# Patient Record
Sex: FEMALE | Race: WHITE | NOT HISPANIC OR LATINO | URBAN - METROPOLITAN AREA
[De-identification: names, ages, dates, MRNs, and addresses within clinical notes are randomized per-mention and may not be internally consistent; named-entity substitution may affect disease eponyms.]

---

## 2017-07-18 ENCOUNTER — APPOINTMENT (RX ONLY)
Dept: URBAN - METROPOLITAN AREA CLINIC 44 | Facility: CLINIC | Age: 82
Setting detail: DERMATOLOGY
End: 2017-07-18

## 2017-07-18 ENCOUNTER — APPOINTMENT (RX ONLY)
Dept: URBAN - METROPOLITAN AREA CLINIC 45 | Facility: CLINIC | Age: 82
Setting detail: DERMATOLOGY
End: 2017-07-18

## 2017-07-18 DIAGNOSIS — L82.1 OTHER SEBORRHEIC KERATOSIS: ICD-10-CM

## 2017-07-18 DIAGNOSIS — D18.0 HEMANGIOMA: ICD-10-CM

## 2017-07-18 DIAGNOSIS — L57.0 ACTINIC KERATOSIS: ICD-10-CM

## 2017-07-18 DIAGNOSIS — L81.4 OTHER MELANIN HYPERPIGMENTATION: ICD-10-CM

## 2017-07-18 DIAGNOSIS — D22 MELANOCYTIC NEVI: ICD-10-CM

## 2017-07-18 PROBLEM — D18.01 HEMANGIOMA OF SKIN AND SUBCUTANEOUS TISSUE: Status: ACTIVE | Noted: 2017-07-18

## 2017-07-18 PROBLEM — D22.39 MELANOCYTIC NEVI OF OTHER PARTS OF FACE: Status: ACTIVE | Noted: 2017-07-18

## 2017-07-18 PROBLEM — I10 ESSENTIAL (PRIMARY) HYPERTENSION: Status: ACTIVE | Noted: 2017-07-18

## 2017-07-18 PROCEDURE — 99214 OFFICE O/P EST MOD 30 MIN: CPT | Mod: 25

## 2017-07-18 PROCEDURE — 17000 DESTRUCT PREMALG LESION: CPT

## 2017-07-18 PROCEDURE — 17003 DESTRUCT PREMALG LES 2-14: CPT

## 2017-07-18 PROCEDURE — ? COUNSELING

## 2017-07-18 PROCEDURE — ? LIQUID NITROGEN

## 2017-07-18 ASSESSMENT — LOCATION DETAILED DESCRIPTION DERM
LOCATION DETAILED: LEFT INFERIOR MEDIAL FOREHEAD
LOCATION DETAILED: RIGHT INFERIOR CENTRAL MALAR CHEEK
LOCATION DETAILED: RIGHT INFERIOR UPPER BACK
LOCATION DETAILED: LEFT INFERIOR CENTRAL MALAR CHEEK
LOCATION DETAILED: RIGHT SUPERIOR MEDIAL UPPER BACK
LOCATION DETAILED: LEFT DORSAL INDEX FINGER METACARPOPHALANGEAL JOINT
LOCATION DETAILED: RIGHT RADIAL DORSAL HAND
LOCATION DETAILED: LEFT INFERIOR MEDIAL MIDBACK
LOCATION DETAILED: RIGHT SUPERIOR MEDIAL UPPER BACK
LOCATION DETAILED: LEFT MID-UPPER BACK
LOCATION DETAILED: LEFT INFERIOR CENTRAL MALAR CHEEK
LOCATION DETAILED: RIGHT MID-UPPER BACK
LOCATION DETAILED: LEFT RADIAL DORSAL HAND
LOCATION DETAILED: RIGHT INFERIOR CENTRAL MALAR CHEEK
LOCATION DETAILED: LEFT INFERIOR MEDIAL FOREHEAD
LOCATION DETAILED: LEFT RADIAL DORSAL HAND
LOCATION DETAILED: INFERIOR THORACIC SPINE
LOCATION DETAILED: RIGHT INFERIOR UPPER BACK
LOCATION DETAILED: RIGHT MID-UPPER BACK
LOCATION DETAILED: RIGHT RADIAL DORSAL HAND
LOCATION DETAILED: LEFT DORSAL INDEX FINGER METACARPOPHALANGEAL JOINT
LOCATION DETAILED: INFERIOR THORACIC SPINE
LOCATION DETAILED: LEFT SUPERIOR MEDIAL UPPER BACK
LOCATION DETAILED: LEFT MID-UPPER BACK
LOCATION DETAILED: LEFT SUPERIOR MEDIAL UPPER BACK
LOCATION DETAILED: LEFT INFERIOR MEDIAL MIDBACK

## 2017-07-18 ASSESSMENT — LOCATION ZONE DERM
LOCATION ZONE: FACE
LOCATION ZONE: HAND
LOCATION ZONE: TRUNK
LOCATION ZONE: FACE
LOCATION ZONE: HAND
LOCATION ZONE: TRUNK

## 2017-07-18 ASSESSMENT — LOCATION SIMPLE DESCRIPTION DERM
LOCATION SIMPLE: LEFT CHEEK
LOCATION SIMPLE: LEFT UPPER BACK
LOCATION SIMPLE: LEFT CHEEK
LOCATION SIMPLE: LEFT LOWER BACK
LOCATION SIMPLE: RIGHT CHEEK
LOCATION SIMPLE: LEFT UPPER BACK
LOCATION SIMPLE: LEFT HAND
LOCATION SIMPLE: LEFT FOREHEAD
LOCATION SIMPLE: UPPER BACK
LOCATION SIMPLE: RIGHT HAND
LOCATION SIMPLE: LEFT LOWER BACK
LOCATION SIMPLE: LEFT FOREHEAD
LOCATION SIMPLE: LEFT HAND
LOCATION SIMPLE: RIGHT UPPER BACK
LOCATION SIMPLE: RIGHT UPPER BACK
LOCATION SIMPLE: UPPER BACK
LOCATION SIMPLE: RIGHT CHEEK
LOCATION SIMPLE: RIGHT HAND

## 2017-07-18 NOTE — PROCEDURE: LIQUID NITROGEN
Number Of Freeze-Thaw Cycles: 1 freeze-thaw cycle
Render Post-Care Instructions In Note?: no
Duration Of Freeze Thaw-Cycle (Seconds): 5
Post-Care Instructions: I reviewed with the patient in detail post-care instructions. Patient is to wear sunprotection, and avoid picking at any of the treated lesions. Pt may apply Vaseline to crusted or scabbing areas.
Detail Level: Detailed
Consent: The patient's consent was obtained including but not limited to risks of crusting, scabbing, blistering, scarring, darker or lighter pigmentary change, recurrence, incomplete removal and infection.

## 2017-07-18 NOTE — PROCEDURE: MIPS QUALITY
Quality 47: Advance Care Plan: Advance Care Planning discussed and documented; advance care plan or surrogate decision maker documented in the medical record.
Detail Level: Detailed
Quality 111:Pneumonia Vaccination Status For Older Adults: Pneumococcal Vaccination Previously Received
Quality 226: Preventive Care And Screening: Tobacco Use: Screening And Cessation Intervention: Patient screened for tobacco and never smoked
Quality 110: Preventive Care And Screening: Influenza Immunization: Influenza immunization was not ordered or administered, reason not given
Quality 431: Preventive Care And Screening: Unhealthy Alcohol Use - Screening: Patient screened for unhealthy alcohol use using a single question and scores less than 2 times per year
Quality 130: Documentation Of Current Medications In The Medical Record: Current Medications Documented
Name And Contact Information For Health Care Proxy: Leonardo Ahn [phone]

## 2017-07-18 NOTE — PROCEDURE: LIQUID NITROGEN
Render Post-Care Instructions In Note?: no
Detail Level: Detailed
Post-Care Instructions: I reviewed with the patient in detail post-care instructions. Patient is to wear sunprotection, and avoid picking at any of the treated lesions. Pt may apply Vaseline to crusted or scabbing areas.
Number Of Freeze-Thaw Cycles: 1 freeze-thaw cycle
Duration Of Freeze Thaw-Cycle (Seconds): 5
Consent: The patient's consent was obtained including but not limited to risks of crusting, scabbing, blistering, scarring, darker or lighter pigmentary change, recurrence, incomplete removal and infection.

## 2017-09-25 ENCOUNTER — RX ONLY (OUTPATIENT)
Age: 82
Setting detail: RX ONLY
End: 2017-09-25

## 2017-09-25 RX ORDER — CARBIT/EQUIS XT/ETHAN/CHIT/MSM
LIQUID (ML) TOPICAL ONCE A DAY
Qty: 3 | Refills: 2 | Status: ERX

## 2018-07-24 ENCOUNTER — APPOINTMENT (RX ONLY)
Dept: URBAN - METROPOLITAN AREA CLINIC 44 | Facility: CLINIC | Age: 83
Setting detail: DERMATOLOGY
End: 2018-07-24

## 2018-07-24 ENCOUNTER — APPOINTMENT (RX ONLY)
Dept: URBAN - METROPOLITAN AREA CLINIC 45 | Facility: CLINIC | Age: 83
Setting detail: DERMATOLOGY
End: 2018-07-24

## 2018-07-24 DIAGNOSIS — Q826 OTHER SPECIFIED ANOMALIES OF SKIN: ICD-10-CM

## 2018-07-24 DIAGNOSIS — L57.0 ACTINIC KERATOSIS: ICD-10-CM

## 2018-07-24 DIAGNOSIS — D36.1 BENIGN NEOPLASM OF PERIPHERAL NERVES AND AUTONOMIC NERVOUS SYSTEM: ICD-10-CM

## 2018-07-24 DIAGNOSIS — L28.1 PRURIGO NODULARIS: ICD-10-CM

## 2018-07-24 DIAGNOSIS — L81.4 OTHER MELANIN HYPERPIGMENTATION: ICD-10-CM

## 2018-07-24 DIAGNOSIS — Q828 OTHER SPECIFIED ANOMALIES OF SKIN: ICD-10-CM

## 2018-07-24 DIAGNOSIS — L82.1 OTHER SEBORRHEIC KERATOSIS: ICD-10-CM

## 2018-07-24 DIAGNOSIS — D18.0 HEMANGIOMA: ICD-10-CM

## 2018-07-24 DIAGNOSIS — Q819 OTHER SPECIFIED ANOMALIES OF SKIN: ICD-10-CM

## 2018-07-24 DIAGNOSIS — D22 MELANOCYTIC NEVI: ICD-10-CM

## 2018-07-24 PROBLEM — D18.01 HEMANGIOMA OF SKIN AND SUBCUTANEOUS TISSUE: Status: ACTIVE | Noted: 2018-07-24

## 2018-07-24 PROBLEM — Q82.8 OTHER SPECIFIED CONGENITAL MALFORMATIONS OF SKIN: Status: ACTIVE | Noted: 2018-07-24

## 2018-07-24 PROBLEM — D22.39 MELANOCYTIC NEVI OF OTHER PARTS OF FACE: Status: ACTIVE | Noted: 2018-07-24

## 2018-07-24 PROBLEM — D36.11 BENIGN NEOPLASM OF PERIPHERAL NERVES AND AUTONOMIC NERVOUS SYSTEM OF FACE, HEAD, AND NECK: Status: ACTIVE | Noted: 2018-07-24

## 2018-07-24 PROCEDURE — ? COUNSELING

## 2018-07-24 PROCEDURE — ? LIQUID NITROGEN

## 2018-07-24 PROCEDURE — 99214 OFFICE O/P EST MOD 30 MIN: CPT | Mod: 25

## 2018-07-24 PROCEDURE — 17000 DESTRUCT PREMALG LESION: CPT

## 2018-07-24 PROCEDURE — ? OBSERVATION AND MEASURE

## 2018-07-24 PROCEDURE — ? TREATMENT REGIMEN

## 2018-07-24 PROCEDURE — 17003 DESTRUCT PREMALG LES 2-14: CPT

## 2018-07-24 ASSESSMENT — LOCATION SIMPLE DESCRIPTION DERM
LOCATION SIMPLE: LEFT THIGH
LOCATION SIMPLE: LEFT UPPER BACK
LOCATION SIMPLE: LEFT POSTERIOR UPPER ARM
LOCATION SIMPLE: LEFT HAND
LOCATION SIMPLE: RIGHT HAND
LOCATION SIMPLE: RIGHT POSTERIOR UPPER ARM
LOCATION SIMPLE: LEFT HAND
LOCATION SIMPLE: LEFT FOREHEAD
LOCATION SIMPLE: LEFT LOWER BACK
LOCATION SIMPLE: NECK
LOCATION SIMPLE: RIGHT HAND
LOCATION SIMPLE: RIGHT THIGH
LOCATION SIMPLE: NECK
LOCATION SIMPLE: LEFT THIGH
LOCATION SIMPLE: RIGHT CHEEK
LOCATION SIMPLE: LEFT CHEEK
LOCATION SIMPLE: UPPER BACK
LOCATION SIMPLE: LEFT POSTERIOR UPPER ARM
LOCATION SIMPLE: LEFT MIDDLE FINGER
LOCATION SIMPLE: RIGHT UPPER BACK
LOCATION SIMPLE: RIGHT UPPER BACK
LOCATION SIMPLE: LEFT FOREHEAD
LOCATION SIMPLE: UPPER BACK
LOCATION SIMPLE: LEFT LOWER BACK
LOCATION SIMPLE: LEFT UPPER BACK
LOCATION SIMPLE: RIGHT CHEEK
LOCATION SIMPLE: LEFT CHEEK
LOCATION SIMPLE: RIGHT POSTERIOR UPPER ARM
LOCATION SIMPLE: RIGHT THIGH
LOCATION SIMPLE: LEFT MIDDLE FINGER

## 2018-07-24 ASSESSMENT — LOCATION DETAILED DESCRIPTION DERM
LOCATION DETAILED: LEFT MID-UPPER BACK
LOCATION DETAILED: LEFT ANTERIOR DISTAL THIGH
LOCATION DETAILED: RIGHT SUPERIOR MEDIAL UPPER BACK
LOCATION DETAILED: INFERIOR THORACIC SPINE
LOCATION DETAILED: RIGHT SUPERIOR MEDIAL UPPER BACK
LOCATION DETAILED: LEFT ULNAR DORSAL HAND
LOCATION DETAILED: RIGHT INFERIOR CENTRAL MALAR CHEEK
LOCATION DETAILED: LEFT SUPERIOR MEDIAL UPPER BACK
LOCATION DETAILED: LEFT PROXIMAL POSTERIOR UPPER ARM
LOCATION DETAILED: LEFT INFERIOR MEDIAL MIDBACK
LOCATION DETAILED: LEFT PROXIMAL POSTERIOR UPPER ARM
LOCATION DETAILED: LEFT INFERIOR MEDIAL MIDBACK
LOCATION DETAILED: RIGHT ANTERIOR PROXIMAL THIGH
LOCATION DETAILED: RIGHT MID-UPPER BACK
LOCATION DETAILED: LEFT MID-UPPER BACK
LOCATION DETAILED: LEFT SUPERIOR MEDIAL UPPER BACK
LOCATION DETAILED: RIGHT PROXIMAL POSTERIOR UPPER ARM
LOCATION DETAILED: RIGHT INFERIOR UPPER BACK
LOCATION DETAILED: LEFT INFERIOR CENTRAL MALAR CHEEK
LOCATION DETAILED: INFERIOR THORACIC SPINE
LOCATION DETAILED: RIGHT ANTERIOR PROXIMAL THIGH
LOCATION DETAILED: LEFT INFERIOR MEDIAL FOREHEAD
LOCATION DETAILED: LEFT PROXIMAL DORSAL MIDDLE FINGER
LOCATION DETAILED: RIGHT INFERIOR UPPER BACK
LOCATION DETAILED: LEFT PROXIMAL DORSAL MIDDLE FINGER
LOCATION DETAILED: LEFT INFERIOR MEDIAL FOREHEAD
LOCATION DETAILED: RIGHT RADIAL DORSAL HAND
LOCATION DETAILED: RIGHT PROXIMAL POSTERIOR UPPER ARM
LOCATION DETAILED: LEFT RADIAL DORSAL HAND
LOCATION DETAILED: RIGHT MID-UPPER BACK
LOCATION DETAILED: LEFT RADIAL DORSAL HAND
LOCATION DETAILED: RIGHT INFERIOR CENTRAL MALAR CHEEK
LOCATION DETAILED: RIGHT RADIAL DORSAL HAND
LOCATION DETAILED: LEFT ANTERIOR DISTAL THIGH
LOCATION DETAILED: LEFT ULNAR DORSAL HAND
LOCATION DETAILED: LEFT INFERIOR CENTRAL MALAR CHEEK
LOCATION DETAILED: RIGHT SUPERIOR LATERAL NECK
LOCATION DETAILED: RIGHT SUPERIOR LATERAL NECK

## 2018-07-24 ASSESSMENT — LOCATION ZONE DERM
LOCATION ZONE: FINGER
LOCATION ZONE: FINGER
LOCATION ZONE: NECK
LOCATION ZONE: ARM
LOCATION ZONE: HAND
LOCATION ZONE: FACE
LOCATION ZONE: LEG
LOCATION ZONE: HAND
LOCATION ZONE: NECK
LOCATION ZONE: ARM
LOCATION ZONE: TRUNK
LOCATION ZONE: LEG
LOCATION ZONE: TRUNK
LOCATION ZONE: FACE

## 2018-07-24 NOTE — PROCEDURE: MIPS QUALITY
Quality 226: Preventive Care And Screening: Tobacco Use: Screening And Cessation Intervention: Patient screened for tobacco and never smoked
Quality 431: Preventive Care And Screening: Unhealthy Alcohol Use - Screening: Patient screened for unhealthy alcohol use using a single question and scores less than 2 times per year
Name And Contact Information For Health Care Proxy: Artur Bradford [phone]
Quality 111:Pneumonia Vaccination Status For Older Adults: Pneumococcal Vaccination Previously Received
Quality 47: Advance Care Plan: Advance Care Planning discussed and documented; advance care plan or surrogate decision maker documented in the medical record.
Quality 130: Documentation Of Current Medications In The Medical Record: Current Medications Documented
Detail Level: Detailed

## 2018-07-24 NOTE — PROCEDURE: TREATMENT REGIMEN
Plan: Patient was instructed to use an exfoliating loofah in shower and to apply CeraVe SE or Eucerin Advance Repair (coupon given) after shower
Detail Level: Simple
Samples Given: Impoyz
Samples Given: Neutrogena sunscreen with coupon

## 2018-07-24 NOTE — PROCEDURE: MIPS QUALITY
Name And Contact Information For Health Care Proxy: Leonardo Ahn [phone]
Quality 47: Advance Care Plan: Advance Care Planning discussed and documented; advance care plan or surrogate decision maker documented in the medical record.
Quality 226: Preventive Care And Screening: Tobacco Use: Screening And Cessation Intervention: Patient screened for tobacco and never smoked
Quality 431: Preventive Care And Screening: Unhealthy Alcohol Use - Screening: Patient screened for unhealthy alcohol use using a single question and scores less than 2 times per year
Quality 111:Pneumonia Vaccination Status For Older Adults: Pneumococcal Vaccination Previously Received
Detail Level: Detailed
Quality 130: Documentation Of Current Medications In The Medical Record: Current Medications Documented

## 2018-07-24 NOTE — PROCEDURE: LIQUID NITROGEN
Number Of Freeze-Thaw Cycles: 1 freeze-thaw cycle
Duration Of Freeze Thaw-Cycle (Seconds): 5
Consent: The patient's consent was obtained including but not limited to risks of crusting, scabbing, blistering, scarring, darker or lighter pigmentary change, recurrence, incomplete removal and infection.
Post-Care Instructions: I reviewed with the patient in detail post-care instructions. Patient is to wear sunprotection, and avoid picking at any of the treated lesions. Pt may apply Vaseline to crusted or scabbing areas.
Detail Level: Detailed
Render Post-Care Instructions In Note?: no

## 2018-11-20 ENCOUNTER — APPOINTMENT (RX ONLY)
Dept: URBAN - METROPOLITAN AREA CLINIC 38 | Facility: CLINIC | Age: 83
Setting detail: DERMATOLOGY
End: 2018-11-20

## 2018-11-20 ENCOUNTER — APPOINTMENT (RX ONLY)
Dept: URBAN - METROPOLITAN AREA CLINIC 37 | Facility: CLINIC | Age: 83
Setting detail: DERMATOLOGY
End: 2018-11-20

## 2018-11-20 DIAGNOSIS — L29.89 OTHER PRURITUS: ICD-10-CM

## 2018-11-20 DIAGNOSIS — L29.8 OTHER PRURITUS: ICD-10-CM

## 2018-11-20 PROCEDURE — ? PRESCRIPTION

## 2018-11-20 PROCEDURE — ? COUNSELING

## 2018-11-20 PROCEDURE — 99213 OFFICE O/P EST LOW 20 MIN: CPT

## 2018-11-20 PROCEDURE — ? ADDITIONAL NOTES

## 2018-11-20 RX ORDER — TRIAMCINOLONE ACETONIDE 1 MG/G
THIN LAYER CREAM TOPICAL BID
Qty: 1 | Refills: 1 | Status: ERX | COMMUNITY
Start: 2018-11-20

## 2018-11-20 RX ADMIN — TRIAMCINOLONE ACETONIDE THIN LAYER: 1 CREAM TOPICAL at 00:00

## 2018-11-20 ASSESSMENT — LOCATION DETAILED DESCRIPTION DERM
LOCATION DETAILED: INFERIOR THORACIC SPINE
LOCATION DETAILED: PERIUMBILICAL SKIN
LOCATION DETAILED: INFERIOR THORACIC SPINE
LOCATION DETAILED: PERIUMBILICAL SKIN

## 2018-11-20 ASSESSMENT — LOCATION SIMPLE DESCRIPTION DERM
LOCATION SIMPLE: UPPER BACK
LOCATION SIMPLE: ABDOMEN
LOCATION SIMPLE: UPPER BACK
LOCATION SIMPLE: ABDOMEN

## 2018-11-20 ASSESSMENT — LOCATION ZONE DERM
LOCATION ZONE: TRUNK
LOCATION ZONE: TRUNK

## 2018-11-20 NOTE — PROCEDURE: MIPS QUALITY
Quality 47: Advance Care Plan: Advance Care Planning discussed and documented; advance care plan or surrogate decision maker documented in the medical record.
Quality 111:Pneumonia Vaccination Status For Older Adults: Pneumococcal Vaccination Previously Received
Quality 130: Documentation Of Current Medications In The Medical Record: Current Medications Documented
Name And Contact Information For Health Care Proxy: Artur Bradford [phone]
Quality 226: Preventive Care And Screening: Tobacco Use: Screening And Cessation Intervention: Patient screened for tobacco use and is an ex/non-smoker
Quality 110: Preventive Care And Screening: Influenza Immunization: Influenza Immunization Administered during Influenza season
Detail Level: Detailed
Quality 431: Preventive Care And Screening: Unhealthy Alcohol Use - Screening: Patient screened for unhealthy alcohol use using a single question and scores less than 2 times per year

## 2018-11-20 NOTE — PROCEDURE: ADDITIONAL NOTES
Additional Notes: This is pruritus, predominantly of the trunk, for about 2 months, although it began nebulously.  OTC HC cream seemed to help a little.  She related it possibly to changing the dose on her LEXAPRO, but stopped that 2 weeks ago and the itching persists.  She has never had anything like this before.  She uses DIAL soap.\\n\\nThere is no rash today.  There is mild xerosis.\\n\\nDiscuss skin diet to monitor soaps, moisturizers, detergents.\\n\\nDiscontinue Dial\\n\\nStart gentle wash Qd\\nStart moisturizing daily \\nStart Dermend Anti itch lotion 3-4x daily\\nStart Triamcinolone cream bid x2 weeks / month as needed.\\n \\nDermend coupon given.\\n\\nAdvised since discontinuing Lexapro give it at least 6 to 8 weeks.\\n\\nRto in 1 month if rash worsens will consider blood work or possibly bx at that time.
Detail Level: Simple

## 2018-11-20 NOTE — HPI: RASH
What Type Of Note Output Would You Prefer (Optional)?: Bullet Format
How Severe Is Your Rash?: mild
Is This A New Presentation, Or A Follow-Up?: Rash
Additional History: Patient states and her pcp been trying to observe and monitor her LEXAPRO intake. Pcp d/c lexapro rash still remains. She has changed laundry detergent and diet to try to figure out what is causing the rash.

## 2018-11-20 NOTE — PROCEDURE: MIPS QUALITY
Detail Level: Detailed
Quality 110: Preventive Care And Screening: Influenza Immunization: Influenza Immunization Administered during Influenza season
Name And Contact Information For Health Care Proxy: Leonardo Ahn [phone]
Quality 130: Documentation Of Current Medications In The Medical Record: Current Medications Documented
Quality 47: Advance Care Plan: Advance Care Planning discussed and documented; advance care plan or surrogate decision maker documented in the medical record.
Quality 111:Pneumonia Vaccination Status For Older Adults: Pneumococcal Vaccination Previously Received
Quality 226: Preventive Care And Screening: Tobacco Use: Screening And Cessation Intervention: Patient screened for tobacco use and is an ex/non-smoker
Quality 431: Preventive Care And Screening: Unhealthy Alcohol Use - Screening: Patient screened for unhealthy alcohol use using a single question and scores less than 2 times per year

## 2018-11-27 ENCOUNTER — APPOINTMENT (RX ONLY)
Dept: URBAN - METROPOLITAN AREA CLINIC 37 | Facility: CLINIC | Age: 83
Setting detail: DERMATOLOGY
End: 2018-11-27

## 2018-11-27 ENCOUNTER — APPOINTMENT (RX ONLY)
Dept: URBAN - METROPOLITAN AREA CLINIC 38 | Facility: CLINIC | Age: 83
Setting detail: DERMATOLOGY
End: 2018-11-27

## 2018-11-27 DIAGNOSIS — L29.89 OTHER PRURITUS: ICD-10-CM

## 2018-11-27 DIAGNOSIS — L29.8 OTHER PRURITUS: ICD-10-CM

## 2018-11-27 PROCEDURE — ? COUNSELING

## 2018-11-27 PROCEDURE — ? ADDITIONAL NOTES

## 2018-11-27 PROCEDURE — 99213 OFFICE O/P EST LOW 20 MIN: CPT

## 2018-11-27 ASSESSMENT — LOCATION ZONE DERM
LOCATION ZONE: TRUNK
LOCATION ZONE: TRUNK

## 2018-11-27 ASSESSMENT — LOCATION DETAILED DESCRIPTION DERM
LOCATION DETAILED: INFERIOR THORACIC SPINE
LOCATION DETAILED: INFERIOR THORACIC SPINE
LOCATION DETAILED: PERIUMBILICAL SKIN
LOCATION DETAILED: PERIUMBILICAL SKIN

## 2018-11-27 ASSESSMENT — LOCATION SIMPLE DESCRIPTION DERM
LOCATION SIMPLE: ABDOMEN
LOCATION SIMPLE: UPPER BACK
LOCATION SIMPLE: UPPER BACK
LOCATION SIMPLE: ABDOMEN

## 2018-11-27 NOTE — PROCEDURE: ADDITIONAL NOTES
Detail Level: Simple
Additional Notes: This is pruritus, predominantly of the trunk, for about 2+ months, although it began nebulously.  OTC HC cream seemed to help a little.  She related it possibly to changing the dose on her LEXAPRO, but stopped that 2 weeks ago and the itching persists.  She has never had anything like this before.  She uses DIAL soap as well as [employer] gels.  I saw her 1 week ago but she came back today because the itching is persistent.\\n\\nReviewed CMP and CBC with diff from PCP taken Oct 2018.  These were unremarkable.\\n\\nThere is no rash today.  There is mild xerosis.\\n\\nDiscuss skin diet to monitor soaps, moisturizers, detergents.\\n\\nDiscontinue Dial / BBW cleanser.\\n\\nStart gentle wash Qd\\nStart moisturizing daily \\nStart Dermend Anti itch lotion 3-4x daily; sample given.\\nCont Triamcinolone cream bid x2 weeks / month as needed.\\n\\nAdvised since discontinuing Lexapro give it at least 6 to 8 weeks.\\n\\nRto in 1 month if rash worsens will consider extended blood work or possibly bx at that time.

## 2018-11-27 NOTE — PROCEDURE: MIPS QUALITY
Quality 111:Pneumonia Vaccination Status For Older Adults: Pneumococcal Vaccination Previously Received
Quality 130: Documentation Of Current Medications In The Medical Record: Current Medications Documented
Quality 110: Preventive Care And Screening: Influenza Immunization: Influenza Immunization Administered during Influenza season
Quality 431: Preventive Care And Screening: Unhealthy Alcohol Use - Screening: Patient screened for unhealthy alcohol use using a single question and scores less than 2 times per year
Quality 47: Advance Care Plan: Advance Care Planning discussed and documented; advance care plan or surrogate decision maker documented in the medical record.
Quality 226: Preventive Care And Screening: Tobacco Use: Screening And Cessation Intervention: Patient screened for tobacco use and is an ex/non-smoker
Name And Contact Information For Health Care Proxy: Leonardo Ahn [phone]
Detail Level: Detailed

## 2018-11-27 NOTE — PROCEDURE: MIPS QUALITY
Quality 130: Documentation Of Current Medications In The Medical Record: Current Medications Documented
Quality 47: Advance Care Plan: Advance Care Planning discussed and documented; advance care plan or surrogate decision maker documented in the medical record.
Quality 110: Preventive Care And Screening: Influenza Immunization: Influenza Immunization Administered during Influenza season
Quality 226: Preventive Care And Screening: Tobacco Use: Screening And Cessation Intervention: Patient screened for tobacco use and is an ex/non-smoker
Quality 111:Pneumonia Vaccination Status For Older Adults: Pneumococcal Vaccination Previously Received
Name And Contact Information For Health Care Proxy: Artur Bradford [phone]
Quality 431: Preventive Care And Screening: Unhealthy Alcohol Use - Screening: Patient screened for unhealthy alcohol use using a single question and scores less than 2 times per year
Detail Level: Detailed

## 2018-11-27 NOTE — PROCEDURE: ADDITIONAL NOTES
Additional Notes: This is pruritus, predominantly of the trunk, for about 2+ months, although it began nebulously.  OTC HC cream seemed to help a little.  She related it possibly to changing the dose on her LEXAPRO, but stopped that 2 weeks ago and the itching persists.  She has never had anything like this before.  She uses DIAL soap as well as B[employer] B[employer] gels.  I saw her 1 week ago but she came back today because the itching is persistent.\\n\\nReviewed CMP and CBC with diff from PCP taken Oct 2018.  These were unremarkable.\\n\\nThere is no rash today.  There is mild xerosis.\\n\\nDiscuss skin diet to monitor soaps, moisturizers, detergents.\\n\\nDiscontinue Dial / BBW cleanser.\\n\\nStart gentle wash Qd\\nStart moisturizing daily \\nStart Dermend Anti itch lotion 3-4x daily; sample given.\\nCont Triamcinolone cream bid x2 weeks / month as needed.\\n\\nAdvised since discontinuing Lexapro give it at least 6 to 8 weeks.\\n\\nRto in 1 month if rash worsens will consider extended blood work or possibly bx at that time.
Detail Level: Simple

## 2018-11-30 ENCOUNTER — APPOINTMENT (RX ONLY)
Dept: URBAN - METROPOLITAN AREA CLINIC 44 | Facility: CLINIC | Age: 83
Setting detail: DERMATOLOGY
End: 2018-11-30

## 2018-11-30 ENCOUNTER — APPOINTMENT (RX ONLY)
Dept: URBAN - METROPOLITAN AREA CLINIC 45 | Facility: CLINIC | Age: 83
Setting detail: DERMATOLOGY
End: 2018-11-30

## 2018-11-30 DIAGNOSIS — L29.8 OTHER PRURITUS: ICD-10-CM

## 2018-11-30 DIAGNOSIS — L29.89 OTHER PRURITUS: ICD-10-CM

## 2018-11-30 PROCEDURE — 99213 OFFICE O/P EST LOW 20 MIN: CPT

## 2018-11-30 PROCEDURE — ? COUNSELING

## 2018-11-30 PROCEDURE — ? ADDITIONAL NOTES

## 2018-11-30 ASSESSMENT — LOCATION SIMPLE DESCRIPTION DERM
LOCATION SIMPLE: ABDOMEN
LOCATION SIMPLE: ABDOMEN
LOCATION SIMPLE: UPPER BACK
LOCATION SIMPLE: UPPER BACK

## 2018-11-30 ASSESSMENT — LOCATION ZONE DERM
LOCATION ZONE: TRUNK
LOCATION ZONE: TRUNK

## 2018-11-30 ASSESSMENT — LOCATION DETAILED DESCRIPTION DERM
LOCATION DETAILED: PERIUMBILICAL SKIN
LOCATION DETAILED: INFERIOR THORACIC SPINE
LOCATION DETAILED: PERIUMBILICAL SKIN
LOCATION DETAILED: INFERIOR THORACIC SPINE

## 2018-11-30 NOTE — PROCEDURE: MIPS QUALITY
Quality 111:Pneumonia Vaccination Status For Older Adults: Pneumococcal Vaccination Previously Received
Name And Contact Information For Health Care Proxy: Artur Bradford [phone]
Quality 431: Preventive Care And Screening: Unhealthy Alcohol Use - Screening: Patient screened for unhealthy alcohol use using a single question and scores less than 2 times per year
Quality 226: Preventive Care And Screening: Tobacco Use: Screening And Cessation Intervention: Patient screened for tobacco use and is an ex/non-smoker
Detail Level: Detailed
Quality 110: Preventive Care And Screening: Influenza Immunization: Influenza Immunization Administered during Influenza season
Quality 130: Documentation Of Current Medications In The Medical Record: Current Medications Documented
Quality 47: Advance Care Plan: Advance Care Planning discussed and documented; advance care plan or surrogate decision maker documented in the medical record.

## 2018-11-30 NOTE — PROCEDURE: ADDITIONAL NOTES
Additional Notes: This is pruritus, predominantly of the trunk, for about 2+ months, although it began nebulously.  OTC HC cream seemed to help a little.  She related it possibly to changing the dose on her LEXAPRO, but stopped that 2 weeks ago and the itching persists.  She has never had anything like this before.  She uses DIAL soap as well as B[employer] gels.  I saw her 3 days ago but she came back today because the itching is persistent.\\n\\nThere is no rash today.  There is mild xerosis.\\n\\nDiscussed skin diet to monitor soaps, moisturizers, detergents.\\n\\nDiscontinue Dial / BBW cleanser.\\n\\nCont gentle wash Qd\\nCont moisturizing daily \\nD/C Dermend Anti itch lotion as this seemed to WORSEN the itching.\\nCont Triamcinolone cream bid x2 weeks / month as needed, but should not apply to back at this time.\\n\\nAnticipate doing PATCH TEST on [facility] next week.  Discussed this process and what to expect in detail.  Would've attempted IM steroids but deferred so will be able to PATCH TEST.\\n\\nAdvised since discontinuing Lexapro give it at least 6 to 8 weeks.\\n\\nRto in 1 month if rash worsens will consider extended blood work or possibly bx at that time.
Detail Level: Simple

## 2018-11-30 NOTE — PROCEDURE: MIPS QUALITY
Quality 431: Preventive Care And Screening: Unhealthy Alcohol Use - Screening: Patient screened for unhealthy alcohol use using a single question and scores less than 2 times per year
Detail Level: Detailed
Quality 110: Preventive Care And Screening: Influenza Immunization: Influenza Immunization Administered during Influenza season
Quality 111:Pneumonia Vaccination Status For Older Adults: Pneumococcal Vaccination Previously Received
Quality 130: Documentation Of Current Medications In The Medical Record: Current Medications Documented
Name And Contact Information For Health Care Proxy: Leonardo Ahn [phone]
Quality 47: Advance Care Plan: Advance Care Planning discussed and documented; advance care plan or surrogate decision maker documented in the medical record.
Quality 226: Preventive Care And Screening: Tobacco Use: Screening And Cessation Intervention: Patient screened for tobacco use and is an ex/non-smoker

## 2018-12-03 ENCOUNTER — APPOINTMENT (RX ONLY)
Dept: URBAN - METROPOLITAN AREA CLINIC 45 | Facility: CLINIC | Age: 83
Setting detail: DERMATOLOGY
End: 2018-12-03

## 2018-12-03 ENCOUNTER — APPOINTMENT (RX ONLY)
Dept: URBAN - METROPOLITAN AREA CLINIC 44 | Facility: CLINIC | Age: 83
Setting detail: DERMATOLOGY
End: 2018-12-03

## 2018-12-03 DIAGNOSIS — L29.8 OTHER PRURITUS: ICD-10-CM

## 2018-12-03 DIAGNOSIS — L29.89 OTHER PRURITUS: ICD-10-CM

## 2018-12-03 PROCEDURE — ? COUNSELING

## 2018-12-03 PROCEDURE — 95044 PATCH/APPLICATION TESTS: CPT

## 2018-12-03 PROCEDURE — ? PATCH TESTING

## 2018-12-03 ASSESSMENT — LOCATION SIMPLE DESCRIPTION DERM
LOCATION SIMPLE: ABDOMEN
LOCATION SIMPLE: UPPER BACK
LOCATION SIMPLE: ABDOMEN
LOCATION SIMPLE: UPPER BACK

## 2018-12-03 ASSESSMENT — LOCATION ZONE DERM
LOCATION ZONE: TRUNK
LOCATION ZONE: TRUNK

## 2018-12-03 NOTE — PROCEDURE: PATCH TESTING
Detail Level: None
Consent obtained, risks reviewed including but not limited to rash, itching, allergic reaction, systemic rash, remote possiblity of anaphylaxis to allergen.
Post-Care Instructions: I reviewed with the patient in detail post-care instructions. Patient should not sweat, pick at, or get the patches wet for 48 hours.
Number Of Patches (Maximum Allowable Per Dos By Cms Is 90): 36

## 2018-12-03 NOTE — PROCEDURE: MIPS QUALITY
Quality 130: Documentation Of Current Medications In The Medical Record: Current Medications Documented
Detail Level: Detailed
Quality 111:Pneumonia Vaccination Status For Older Adults: Pneumococcal Vaccination Previously Received
Name And Contact Information For Health Care Proxy: Leonardo Ahn [phone]
Quality 431: Preventive Care And Screening: Unhealthy Alcohol Use - Screening: Patient screened for unhealthy alcohol use using a single question and scores less than 2 times per year
Quality 226: Preventive Care And Screening: Tobacco Use: Screening And Cessation Intervention: Patient screened for tobacco use and is an ex/non-smoker
Quality 110: Preventive Care And Screening: Influenza Immunization: Influenza Immunization Administered during Influenza season
Quality 47: Advance Care Plan: Advance Care Planning discussed and documented; advance care plan or surrogate decision maker documented in the medical record.

## 2018-12-03 NOTE — PROCEDURE: PATCH TESTING
Detail Level: None
Post-Care Instructions: I reviewed with the patient in detail post-care instructions. Patient should not sweat, pick at, or get the patches wet for 48 hours.
Consent obtained, risks reviewed including but not limited to rash, itching, allergic reaction, systemic rash, remote possiblity of anaphylaxis to allergen.
Number Of Patches (Maximum Allowable Per Dos By Cms Is 90): 36

## 2018-12-03 NOTE — PROCEDURE: MIPS QUALITY
Quality 431: Preventive Care And Screening: Unhealthy Alcohol Use - Screening: Patient screened for unhealthy alcohol use using a single question and scores less than 2 times per year
Quality 111:Pneumonia Vaccination Status For Older Adults: Pneumococcal Vaccination Previously Received
Name And Contact Information For Health Care Proxy: Artur Bradford [phone]
Quality 226: Preventive Care And Screening: Tobacco Use: Screening And Cessation Intervention: Patient screened for tobacco use and is an ex/non-smoker
Quality 130: Documentation Of Current Medications In The Medical Record: Current Medications Documented
Quality 47: Advance Care Plan: Advance Care Planning discussed and documented; advance care plan or surrogate decision maker documented in the medical record.
Detail Level: Detailed
Quality 110: Preventive Care And Screening: Influenza Immunization: Influenza Immunization Administered during Influenza season

## 2018-12-05 ENCOUNTER — APPOINTMENT (RX ONLY)
Dept: URBAN - METROPOLITAN AREA CLINIC 44 | Facility: CLINIC | Age: 83
Setting detail: DERMATOLOGY
End: 2018-12-05

## 2018-12-05 ENCOUNTER — APPOINTMENT (RX ONLY)
Dept: URBAN - METROPOLITAN AREA CLINIC 45 | Facility: CLINIC | Age: 83
Setting detail: DERMATOLOGY
End: 2018-12-05

## 2018-12-05 DIAGNOSIS — L29.8 OTHER PRURITUS: ICD-10-CM

## 2018-12-05 DIAGNOSIS — L29.89 OTHER PRURITUS: ICD-10-CM

## 2018-12-05 PROCEDURE — ? ADDITIONAL NOTES

## 2018-12-05 PROCEDURE — ? COUNSELING

## 2018-12-05 PROCEDURE — 99212 OFFICE O/P EST SF 10 MIN: CPT

## 2018-12-05 PROCEDURE — ? TRUE TEST READING

## 2018-12-05 ASSESSMENT — LOCATION ZONE DERM
LOCATION ZONE: TRUNK
LOCATION ZONE: TRUNK

## 2018-12-05 ASSESSMENT — LOCATION DETAILED DESCRIPTION DERM
LOCATION DETAILED: RIGHT MEDIAL UPPER BACK
LOCATION DETAILED: RIGHT MEDIAL UPPER BACK

## 2018-12-05 ASSESSMENT — LOCATION SIMPLE DESCRIPTION DERM
LOCATION SIMPLE: RIGHT UPPER BACK
LOCATION SIMPLE: RIGHT UPPER BACK

## 2018-12-05 NOTE — PROCEDURE: TRUE TEST READING
5 - Don Mix: no reaction
Show Allergen Counseling In The Note?: Yes
Detail Level: Zone
What Reading Time Point?: 48 hour
Number Of Patches Read: 36

## 2018-12-05 NOTE — PROCEDURE: MIPS QUALITY
Quality 130: Documentation Of Current Medications In The Medical Record: Current Medications Documented
Quality 226: Preventive Care And Screening: Tobacco Use: Screening And Cessation Intervention: Patient screened for tobacco use and is an ex/non-smoker
Quality 110: Preventive Care And Screening: Influenza Immunization: Influenza Immunization Administered during Influenza season
Name And Contact Information For Health Care Proxy: Artur Bradford [phone]
Quality 47: Advance Care Plan: Advance Care Planning discussed and documented; advance care plan or surrogate decision maker documented in the medical record.
Detail Level: Detailed
Quality 111:Pneumonia Vaccination Status For Older Adults: Pneumococcal Vaccination Previously Received
Quality 431: Preventive Care And Screening: Unhealthy Alcohol Use - Screening: Patient screened for unhealthy alcohol use using a single question and scores less than 2 times per year

## 2018-12-05 NOTE — PROCEDURE: MIPS QUALITY
Detail Level: Detailed
Quality 130: Documentation Of Current Medications In The Medical Record: Current Medications Documented
Quality 226: Preventive Care And Screening: Tobacco Use: Screening And Cessation Intervention: Patient screened for tobacco use and is an ex/non-smoker
Name And Contact Information For Health Care Proxy: Leonardo Ahn [phone]
Quality 47: Advance Care Plan: Advance Care Planning discussed and documented; advance care plan or surrogate decision maker documented in the medical record.
Quality 431: Preventive Care And Screening: Unhealthy Alcohol Use - Screening: Patient screened for unhealthy alcohol use using a single question and scores less than 2 times per year
Quality 111:Pneumonia Vaccination Status For Older Adults: Pneumococcal Vaccination Previously Received
Quality 110: Preventive Care And Screening: Influenza Immunization: Influenza Immunization Administered during Influenza season

## 2018-12-05 NOTE — PROCEDURE: ADDITIONAL NOTES
Detail Level: Simple
Additional Notes: No positives today.  \\n\\nPatient will RTC on Friday for 96 hr read.

## 2018-12-05 NOTE — PROCEDURE: TRUE TEST READING
11 - Ethylenediamine Dihydrochloride: no reaction
Number Of Patches Read: 36
Show Allergen Counseling In The Note?: Yes
What Reading Time Point?: 48 hour
Detail Level: Zone

## 2018-12-07 ENCOUNTER — APPOINTMENT (RX ONLY)
Dept: URBAN - METROPOLITAN AREA CLINIC 44 | Facility: CLINIC | Age: 83
Setting detail: DERMATOLOGY
End: 2018-12-07

## 2018-12-07 ENCOUNTER — APPOINTMENT (RX ONLY)
Dept: URBAN - METROPOLITAN AREA CLINIC 45 | Facility: CLINIC | Age: 83
Setting detail: DERMATOLOGY
End: 2018-12-07

## 2018-12-07 DIAGNOSIS — L29.8 OTHER PRURITUS: ICD-10-CM

## 2018-12-07 DIAGNOSIS — Z79.899 OTHER LONG TERM (CURRENT) DRUG THERAPY: ICD-10-CM

## 2018-12-07 DIAGNOSIS — L29.89 OTHER PRURITUS: ICD-10-CM

## 2018-12-07 PROCEDURE — ? ORDER TESTS

## 2018-12-07 PROCEDURE — ? ADDITIONAL NOTES

## 2018-12-07 PROCEDURE — ? HIGH RISK MEDICATION MONITORING

## 2018-12-07 PROCEDURE — ? TRUE TEST READING

## 2018-12-07 PROCEDURE — ? COUNSELING

## 2018-12-07 PROCEDURE — 99213 OFFICE O/P EST LOW 20 MIN: CPT

## 2018-12-07 ASSESSMENT — LOCATION SIMPLE DESCRIPTION DERM
LOCATION SIMPLE: RIGHT UPPER BACK
LOCATION SIMPLE: RIGHT UPPER BACK

## 2018-12-07 ASSESSMENT — LOCATION ZONE DERM
LOCATION ZONE: TRUNK
LOCATION ZONE: TRUNK

## 2018-12-07 ASSESSMENT — ITCH INTENSITY
HOW SEVERE IS YOUR ITCHING?: 10
HOW SEVERE IS YOUR ITCHING?: 10

## 2018-12-07 ASSESSMENT — LOCATION DETAILED DESCRIPTION DERM
LOCATION DETAILED: RIGHT MEDIAL UPPER BACK
LOCATION DETAILED: RIGHT MEDIAL UPPER BACK

## 2018-12-07 NOTE — PROCEDURE: TRUE TEST READING
21 - Formaldehyde: no reaction
What Reading Time Point?: 96 hour
Number Of Patches Read: 36
Show Allergen Counseling In The Note?: Yes
Detail Level: Zone

## 2018-12-07 NOTE — PROCEDURE: ORDER TESTS
Expected Date Of Service: 12/07/2018
Billing Type: Third-Party Bill
Lab Facility: [id number]
Performing Laboratory: [id number]
Bill For Surgical Tray: no

## 2018-12-07 NOTE — PROCEDURE: MIPS QUALITY
Quality 111:Pneumonia Vaccination Status For Older Adults: Pneumococcal Vaccination Previously Received
Quality 110: Preventive Care And Screening: Influenza Immunization: Influenza Immunization Administered during Influenza season
Name And Contact Information For Health Care Proxy: Leonardo Ahn [phone]
Quality 47: Advance Care Plan: Advance Care Planning discussed and documented; advance care plan or surrogate decision maker documented in the medical record.
Detail Level: Detailed
Quality 130: Documentation Of Current Medications In The Medical Record: Current Medications Documented
Quality 431: Preventive Care And Screening: Unhealthy Alcohol Use - Screening: Patient screened for unhealthy alcohol use using a single question and scores less than 2 times per year
Quality 226: Preventive Care And Screening: Tobacco Use: Screening And Cessation Intervention: Patient screened for tobacco use and is an ex/non-smoker

## 2018-12-07 NOTE — PROCEDURE: TRUE TEST READING
28 - Gold Sodium Thiosulfate: no reaction
Show Negative Results In The Note?: Yes
What Reading Time Point?: 96 hour
Number Of Patches Read: 36
Detail Level: Zone

## 2018-12-07 NOTE — PROCEDURE: ADDITIONAL NOTES
Detail Level: Simple
Additional Notes: This is pruritus sans rash, only xerosis.  The TRUE test screens for 75% of contact allergens, and it is (-).  I suspect drug-induced pruritus (METOPROLOL, HCTZ, or others) vs other internal cause of pruritus vs idiopathic.  This seems to be bothering the patient quite a bit, as she has come to the office multiple times in a short time-period.  \\n\\nThere is nothing to biopsy.  At this point, I think we have 2 options:  1) empirically D/C medications one at a time for 6 to 8 weeks at a time to see if they are cause of pruritus, starting with HCTZ, or 2) treat empirically with a systemic medication.  I would consider GABAPENTIN 100 mg QHS (titrating up to 300 mg TID or higher), NALTREXONE 25 mg QHS x 1 week then increasing to 25 mg BID (counseling on diarrhea and dizziness), trial of IM 40-40, or low-dose MTX to control the pruritus.  If those treatments fail, would consider DUPIXENT inj, because of good safety profile.  PCP could consider age-appropriate cancer screens as well.\\n\\nPatient wants to try systemic treatment.  In the meantime, I will check labs to r/o some internal causes of pruritus or prepare for some systemics (CBC, hepatitis panel, CMP, vit D, TSH).  Explained the risk and side effects of various meds.  If MTX, anticipate starting with 2 tabs per week.  Reiterated this is once WEEKLY not once DAILY. \\n\\nWill call patient and discuss plan once labs are back.

## 2018-12-07 NOTE — PROCEDURE: MIPS QUALITY
Detail Level: Detailed
Quality 110: Preventive Care And Screening: Influenza Immunization: Influenza Immunization Administered during Influenza season
Quality 130: Documentation Of Current Medications In The Medical Record: Current Medications Documented
Quality 431: Preventive Care And Screening: Unhealthy Alcohol Use - Screening: Patient screened for unhealthy alcohol use using a single question and scores less than 2 times per year
Name And Contact Information For Health Care Proxy: Artur Bradford [phone]
Quality 226: Preventive Care And Screening: Tobacco Use: Screening And Cessation Intervention: Patient screened for tobacco use and is an ex/non-smoker
Quality 47: Advance Care Plan: Advance Care Planning discussed and documented; advance care plan or surrogate decision maker documented in the medical record.
Quality 111:Pneumonia Vaccination Status For Older Adults: Pneumococcal Vaccination Previously Received

## 2018-12-07 NOTE — PROCEDURE: HIGH RISK MEDICATION MONITORING
Azithromycin Counseling:  I discussed with the patient the risks of azithromycin including but not limited to GI upset, allergic reaction, drug rash, diarrhea, and yeast infections.
Xeljanz Counseling: I discussed with the patient the risks of Xeljanz therapy including increased risk of infection, liver issues, headache, diarrhea, or cold symptoms. Live vaccines should be avoided. They were instructed to call if they have any problems.
Cellcept Pregnancy And Lactation Text: This medication is Pregnancy Category D and isn't considered safe during pregnancy. It is unknown if this medication is excreted in breast milk.
Hydroxyzine Pregnancy And Lactation Text: This medication is not safe during pregnancy and should not be taken. It is also excreted in breast milk and breast feeding isn't recommended.
Fluconazole Counseling:  Patient counseled regarding adverse effects of fluconazole including but not limited to headache, diarrhea, nausea, upset stomach, liver function test abnormalities, taste disturbance, and stomach pain.  There is a rare possibility of liver failure that can occur when taking fluconazole.  The patient understands that monitoring of LFTs and kidney function test may be required, especially at baseline. The patient verbalized understanding of the proper use and possible adverse effects of fluconazole.  All of the patient's questions and concerns were addressed.
Birth Control Pills Pregnancy And Lactation Text: This medication should be avoided if pregnant and for the first 30 days post-partum.
Eucrisa Counseling: Patient may experience a mild burning sensation during topical application. Eucrisa is not approved in children less than 2 years of age.
Arava Counseling:  Patient counseled regarding adverse effects of Arava including but not limited to nausea, vomiting, abnormalities in liver function tests. Patients may develop mouth sores, rash, diarrhea, and abnormalities in blood counts. The patient understands that monitoring is required including LFTs and blood counts.  There is a rare possibility of scarring of the liver and lung problems that can occur when taking methotrexate. Persistent nausea, loss of appetite, pale stools, dark urine, cough, and shortness of breath should be reported immediately. Patient advised to discontinue Arava treatment and consult with a physician prior to attempting conception. The patient will have to undergo a treatment to eliminate Arava from the body prior to conception.
Enbrel Pregnancy And Lactation Text: This medication is Pregnancy Category B and is considered safe during pregnancy. It is unknown if this medication is excreted in breast milk.
Clindamycin Counseling: I counseled the patient regarding use of clindamycin as an antibiotic for prophylactic and/or therapeutic purposes. Clindamycin is active against numerous classes of bacteria, including skin bacteria. Side effects may include nausea, diarrhea, gastrointestinal upset, rash, hives, yeast infections, and in rare cases, colitis.
Prednisone Pregnancy And Lactation Text: This medication is Pregnancy Category C and it isn't know if it is safe during pregnancy. This medication is excreted in breast milk.
Picato Counseling:  I discussed with the patient the risks of Picato including but not limited to erythema, scaling, itching, weeping, crusting, and pain.
Humira Counseling:  I discussed with the patient the risks of adalimumab including but not limited to myelosuppression, immunosuppression, autoimmune hepatitis, demyelinating diseases, lymphoma, and serious infections.  The patient understands that monitoring is required including a PPD at baseline and must alert us or the primary physician if symptoms of infection or other concerning signs are noted.
Clindamycin Pregnancy And Lactation Text: This medication can be used in pregnancy if certain situations. Clindamycin is also present in breast milk.
Arava Pregnancy And Lactation Text: This medication is Pregnancy Category X and is absolutely contraindicated during pregnancy. It is unknown if it is excreted in breast milk.
Picato Pregnancy And Lactation Text: This medication is Pregnancy Category C. It is unknown if this medication is excreted in breast milk.
High Dose Vitamin A Pregnancy And Lactation Text: High dose vitamin A therapy is contraindicated during pregnancy and breast feeding.
Tazorac Pregnancy And Lactation Text: This medication is not safe during pregnancy. It is unknown if this medication is excreted in breast milk.
Nsaids Pregnancy And Lactation Text: These medications are considered safe up to 30 weeks gestation. It is excreted in breast milk.
Carac Counseling:  I discussed with the patient the risks of Carac including but not limited to erythema, scaling, itching, weeping, crusting, and pain.
Minocycline Pregnancy And Lactation Text: This medication is Pregnancy Category D and not consider safe during pregnancy. It is also excreted in breast milk.
Terbinafine Counseling: Patient counseling regarding adverse effects of terbinafine including but not limited to headache, diarrhea, rash, upset stomach, liver function test abnormalities, itching, taste/smell disturbance, nausea, abdominal pain, and flatulence.  There is a rare possibility of liver failure that can occur when taking terbinafine.  The patient understands that a baseline LFT and kidney function test may be required. The patient verbalized understanding of the proper use and possible adverse effects of terbinafine.  All of the patient's questions and concerns were addressed.
Odomzo Counseling- I discussed with the patient the risks of Odomzo including but not limited to nausea, vomiting, diarrhea, constipation, weight loss, changes in the sense of taste, decreased appetite, muscle spasms, and hair loss.  The patient verbalized understanding of the proper use and possible adverse effects of Odomzo.  All of the patient's questions and concerns were addressed.
Cyclophosphamide Counseling:  I discussed with the patient the risks of cyclophosphamide including but not limited to hair loss, hormonal abnormalities, decreased fertility, abdominal pain, diarrhea, nausea and vomiting, bone marrow suppression and infection. The patient understands that monitoring is required while taking this medication.
Quinolones Counseling:  I discussed with the patient the risks of fluoroquinolones including but not limited to GI upset, allergic reaction, drug rash, diarrhea, dizziness, photosensitivity, yeast infections, liver function test abnormalities, tendonitis/tendon rupture.
Cimzia Counseling:  I discussed with the patient the risks of Cimzia including but not limited to immunosuppression, allergic reactions and infections.  The patient understands that monitoring is required including a PPD at baseline and must alert us or the primary physician if symptoms of infection or other concerning signs are noted.
Spironolactone Counseling: Patient advised regarding risks of diarrhea, abdominal pain, hyperkalemia, birth defects (for female patients), liver toxicity and renal toxicity. The patient may need blood work to monitor liver and kidney function and potassium levels while on therapy. The patient verbalized understanding of the proper use and possible adverse effects of spironolactone.  All of the patient's questions and concerns were addressed.
Eucrisa Pregnancy And Lactation Text: This medication has not been assigned a Pregnancy Risk Category but animal studies failed to show danger with the topical medication. It is unknown if the medication is excreted in breast milk.
Simponi Counseling:  I discussed with the patient the risks of golimumab including but not limited to myelosuppression, immunosuppression, autoimmune hepatitis, demyelinating diseases, lymphoma, and serious infections.  The patient understands that monitoring is required including a PPD at baseline and must alert us or the primary physician if symptoms of infection or other concerning signs are noted.
Simponi Pregnancy And Lactation Text: The risk during pregnancy and breastfeeding is uncertain with this medication.
Terbinafine Pregnancy And Lactation Text: This medication is Pregnancy Category B and is considered safe during pregnancy. It is also excreted in breast milk and breast feeding isn't recommended.
Gabapentin Counseling: I discussed with the patient the risks of gabapentin including but not limited to dizziness, somnolence, fatigue and ataxia.
Use Enhanced Medication Counseling?: No
Protopic Counseling: Patient may experience a mild burning sensation during topical application. Protopic is not approved in children less than 2 years of age. There have been case reports of hematologic and skin malignancies in patients using topical calcineurin inhibitors although causality is questionable.
Xelmarinoz Pregnancy And Lactation Text: This medication is Pregnancy Category D and is not considered safe during pregnancy.  The risk during breast feeding is also uncertain.
Carac Pregnancy And Lactation Text: This medication is Pregnancy Category X and contraindicated in pregnancy and in women who may become pregnant. It is unknown if this medication is excreted in breast milk.
Azithromycin Pregnancy And Lactation Text: This medication is considered safe during pregnancy and is also secreted in breast milk.
Doxycycline Counseling:  Patient counseled regarding possible photosensitivity and increased risk for sunburn.  Patient instructed to avoid sunlight, if possible.  When exposed to sunlight, patients should wear protective clothing, sunglasses, and sunscreen.  The patient was instructed to call the office immediately if the following severe adverse effects occur:  hearing changes, easy bruising/bleeding, severe headache, or vision changes.  The patient verbalized understanding of the proper use and possible adverse effects of doxycycline.  All of the patient's questions and concerns were addressed.
Fluconazole Pregnancy And Lactation Text: This medication is Pregnancy Category C and it isn't know if it is safe during pregnancy. It is also excreted in breast milk.
Topical Clindamycin Counseling: Patient counseled that this medication may cause skin irritation or allergic reactions.  In the event of skin irritation, the patient was advised to reduce the amount of the drug applied or use it less frequently.   The patient verbalized understanding of the proper use and possible adverse effects of clindamycin.  All of the patient's questions and concerns were addressed.
Topical Clindamycin Pregnancy And Lactation Text: This medication is Pregnancy Category B and is considered safe during pregnancy. It is unknown if it is excreted in breast milk.
Cimzia Pregnancy And Lactation Text: This medication crosses the placenta but can be considered safe in certain situations. Cimzia may be excreted in breast milk.
Albendazole Counseling:  I discussed with the patient the risks of albendazole including but not limited to cytopenia, kidney damage, nausea/vomiting and severe allergy.  The patient understands that this medication is being used in an off-label manner.
Cyclophosphamide Pregnancy And Lactation Text: This medication is Pregnancy Category D and it isn't considered safe during pregnancy. This medication is excreted in breast milk.
Bactrim Counseling:  I discussed with the patient the risks of sulfa antibiotics including but not limited to GI upset, allergic reaction, drug rash, diarrhea, dizziness, photosensitivity, and yeast infections.  Rarely, more serious reactions can occur including but not limited to aplastic anemia, agranulocytosis, methemoglobinemia, blood dyscrasias, liver or kidney failure, lung infiltrates or desquamative/blistering drug rashes.
Hydroquinone Counseling:  Patient advised that medication may result in skin irritation, lightening (hypopigmentation), dryness, and burning.  In the event of skin irritation, the patient was advised to reduce the amount of the drug applied or use it less frequently.  Rarely, spots that are treated with hydroquinone can become darker (pseudoochronosis).  Should this occur, patient instructed to stop medication and call the office. The patient verbalized understanding of the proper use and possible adverse effects of hydroquinone.  All of the patient's questions and concerns were addressed.
Clofazimine Counseling:  I discussed with the patient the risks of clofazimine including but not limited to skin and eye pigmentation, liver damage, nausea/vomiting, gastrointestinal bleeding and allergy.
Spironolactone Pregnancy And Lactation Text: This medication can cause feminization of the male fetus and should be avoided during pregnancy. The active metabolite is also found in breast milk.
Acitretin Counseling:  I discussed with the patient the risks of acitretin including but not limited to hair loss, dry lips/skin/eyes, liver damage, hyperlipidemia, depression/suicidal ideation, photosensitivity.  Serious rare side effects can include but are not limited to pancreatitis, pseudotumor cerebri, bony changes, clot formation/stroke/heart attack.  Patient understands that alcohol is contraindicated since it can result in liver toxicity and significantly prolong the elimination of the drug by many years.
Acitretin Pregnancy And Lactation Text: This medication is Pregnancy Category X and should not be given to women who are pregnant or may become pregnant in the future. This medication is excreted in breast milk.
Protopic Pregnancy And Lactation Text: This medication is Pregnancy Category C. It is unknown if this medication is excreted in breast milk when applied topically.
Gabapentin Pregnancy And Lactation Text: This medication is Pregnancy Category C and isn't considered safe during pregnancy. It is excreted in breast milk.
SSKI Counseling:  I discussed with the patient the risks of SSKI including but not limited to thyroid abnormalities, metallic taste, GI upset, fever, headache, acne, arthralgias, paraesthesias, lymphadenopathy, easy bleeding, arrhythmias, and allergic reaction.
Infliximab Counseling:  I discussed with the patient the risks of infliximab including but not limited to myelosuppression, immunosuppression, autoimmune hepatitis, demyelinating diseases, lymphoma, and serious infections.  The patient understands that monitoring is required including a PPD at baseline and must alert us or the primary physician if symptoms of infection or other concerning signs are noted.
Doxycycline Pregnancy And Lactation Text: This medication is Pregnancy Category D and not consider safe during pregnancy. It is also excreted in breast milk but is considered safe for shorter treatment courses.
Stelara Counseling:  I discussed with the patient the risks of ustekinumab including but not limited to immunosuppression, malignancy, posterior leukoencephalopathy syndrome, and serious infections.  The patient understands that monitoring is required including a PPD at baseline and must alert us or the primary physician if symptoms of infection or other concerning signs are noted.
Griseofulvin Counseling:  I discussed with the patient the risks of griseofulvin including but not limited to photosensitivity, cytopenia, liver damage, nausea/vomiting and severe allergy.  The patient understands that this medication is best absorbed when taken with a fatty meal (e.g., ice cream or french fries).
Benzoyl Peroxide Counseling: Patient counseled that medicine may cause skin irritation and bleach clothing.  In the event of skin irritation, the patient was advised to reduce the amount of the drug applied or use it less frequently.   The patient verbalized understanding of the proper use and possible adverse effects of benzoyl peroxide.  All of the patient's questions and concerns were addressed.
Xolair Counseling:  Patient informed of potential adverse effects including but not limited to fever, muscle aches, rash and allergic reactions.  The patient verbalized understanding of the proper use and possible adverse effects of Xolair.  All of the patient's questions and concerns were addressed.
5-Fu Counseling: 5-Fluorouracil Counseling:  I discussed with the patient the risks of 5-fluorouracil including but not limited to erythema, scaling, itching, weeping, crusting, and pain.
Otezla Counseling: The side effects of Otezla were discussed with the patient, including but not limited to worsening or new depression, weight loss, diarrhea, nausea, upper respiratory tract infection, and headache. Patient instructed to call the office should any adverse effect occur.  The patient verbalized understanding of the proper use and possible adverse effects of Otezla.  All the patient's questions and concerns were addressed.
Albendazole Pregnancy And Lactation Text: This medication is Pregnancy Category C and it isn't known if it is safe during pregnancy. It is also excreted in breast milk.
Cosentyx Counseling:  I discussed with the patient the risks of Cosentyx including but not limited to worsening of Crohn's disease, immunosuppression, allergic reactions and infections.  The patient understands that monitoring is required including a PPD at baseline and must alert us or the primary physician if symptoms of infection or other concerning signs are noted.
Cyclosporine Counseling:  I discussed with the patient the risks of cyclosporine including but not limited to hypertension, gingival hyperplasia,myelosuppression, immunosuppression, liver damage, kidney damage, neurotoxicity, lymphoma, and serious infections. The patient understands that monitoring is required including baseline blood pressure, CBC, CMP, lipid panel and uric acid, and then 1-2 times monthly CMP and blood pressure.
Detail Level: Detailed
Bexarotene Counseling:  I discussed with the patient the risks of bexarotene including but not limited to hair loss, dry lips/skin/eyes, liver abnormalities, hyperlipidemia, pancreatitis, depression/suicidal ideation, photosensitivity, drug rash/allergic reactions, hypothyroidism, anemia, leukopenia, infection, cataracts, and teratogenicity.  Patient understands that they will need regular blood tests to check lipid profile, liver function tests, white blood cell count, thyroid function tests and pregnancy test if applicable.
Solaraze Counseling:  I discussed with the patient the risks of Solaraze including but not limited to erythema, scaling, itching, weeping, crusting, and pain.
Bactrim Pregnancy And Lactation Text: This medication is Pregnancy Category D and is known to cause fetal risk.  It is also excreted in breast milk.
Glycopyrrolate Counseling:  I discussed with the patient the risks of glycopyrrolate including but not limited to skin rash, drowsiness, dry mouth, difficulty urinating, and blurred vision.
Xolair Pregnancy And Lactation Text: This medication is Pregnancy Category B and is considered safe during pregnancy. This medication is excreted in breast milk.
Rifampin Counseling: I discussed with the patient the risks of rifampin including but not limited to liver damage, kidney damage, red-orange body fluids, nausea/vomiting and severe allergy.
Cimetidine Counseling:  I discussed with the patient the risks of Cimetidine including but not limited to gynecomastia, headache, diarrhea, nausea, drowsiness, arrhythmias, pancreatitis, skin rashes, psychosis, bone marrow suppression and kidney toxicity.
Griseofulvin Pregnancy And Lactation Text: This medication is Pregnancy Category X and is known to cause serious birth defects. It is unknown if this medication is excreted in breast milk but breast feeding should be avoided.
Erythromycin Counseling:  I discussed with the patient the risks of erythromycin including but not limited to GI upset, allergic reaction, drug rash, diarrhea, increase in liver enzymes, and yeast infections.
Topical Sulfur Applications Counseling: Topical Sulfur Counseling: Patient counseled that this medication may cause skin irritation or allergic reactions.  In the event of skin irritation, the patient was advised to reduce the amount of the drug applied or use it less frequently.   The patient verbalized understanding of the proper use and possible adverse effects of topical sulfur application.  All of the patient's questions and concerns were addressed.
Rifampin Pregnancy And Lactation Text: This medication is Pregnancy Category C and it isn't know if it is safe during pregnancy. It is also excreted in breast milk and should not be used if you are breast feeding.
Topical Sulfur Applications Pregnancy And Lactation Text: This medication is Pregnancy Category C and has an unknown safety profile during pregnancy. It is unknown if this topical medication is excreted in breast milk.
Otezla Pregnancy And Lactation Text: This medication is Pregnancy Category C and it isn't known if it is safe during pregnancy. It is unknown if it is excreted in breast milk.
Colchicine Counseling:  Patient counseled regarding adverse effects including but not limited to stomach upset (nausea, vomiting, stomach pain, or diarrhea).  Patient instructed to limit alcohol consumption while taking this medication.  Colchicine may reduce blood counts especially with prolonged use.  The patient understands that monitoring of kidney function and blood counts may be required, especially at baseline. The patient verbalized understanding of the proper use and possible adverse effects of colchicine.  All of the patient's questions and concerns were addressed.
Sski Pregnancy And Lactation Text: This medication is Pregnancy Category D and isn't considered safe during pregnancy. It is excreted in breast milk.
Imiquimod Counseling:  I discussed with the patient the risks of imiquimod including but not limited to erythema, scaling, itching, weeping, crusting, and pain.  Patient understands that the inflammatory response to imiquimod is variable from person to person and was educated regarded proper titration schedule.  If flu-like symptoms develop, patient knows to discontinue the medication and contact us.
Ivermectin Counseling:  Patient instructed to take medication on an empty stomach with a full glass of water.  Patient informed of potential adverse effects including but not limited to nausea, diarrhea, dizziness, itching, and swelling of the extremities or lymph nodes.  The patient verbalized understanding of the proper use and possible adverse effects of ivermectin.  All of the patient's questions and concerns were addressed.
Thalidomide Counseling: I discussed with the patient the risks of thalidomide including but not limited to birth defects, anxiety, weakness, chest pain, dizziness, cough and severe allergy.
Bexarotene Pregnancy And Lactation Text: This medication is Pregnancy Category X and should not be given to women who are pregnant or may become pregnant. This medication should not be used if you are breast feeding.
Erythromycin Pregnancy And Lactation Text: This medication is Pregnancy Category B and is considered safe during pregnancy. It is also excreted in breast milk.
Taltz Counseling: I discussed with the patient the risks of ixekizumab including but not limited to immunosuppression, serious infections, worsening of inflammatory bowel disease and drug reactions.  The patient understands that monitoring is required including a PPD at baseline and must alert us or the primary physician if symptoms of infection or other concerning signs are noted.
Rituxan Counseling:  I discussed with the patient the risks of Rituxan infusions. Side effects can include infusion reactions, severe drug rashes including mucocutaneous reactions, reactivation of latent hepatitis and other infections and rarely progressive multifocal leukoencephalopathy.  All of the patient's questions and concerns were addressed.
Glycopyrrolate Pregnancy And Lactation Text: This medication is Pregnancy Category B and is considered safe during pregnancy. It is unknown if it is excreted breast milk.
Solaraze Pregnancy And Lactation Text: This medication is Pregnancy Category B and is considered safe. There is some data to suggest avoiding during the third trimester. It is unknown if this medication is excreted in breast milk.
Itraconazole Counseling:  I discussed with the patient the risks of itraconazole including but not limited to liver damage, nausea/vomiting, neuropathy, and severe allergy.  The patient understands that this medication is best absorbed when taken with acidic beverages such as non-diet cola or ginger ale.  The patient understands that monitoring is required including baseline LFTs and repeat LFTs at intervals.  The patient understands that they are to contact us or the primary physician if concerning signs are noted.
Drysol Counseling:  I discussed with the patient the risks of drysol/aluminum chloride including but not limited to skin rash, itching, irritation, burning.
Azathioprine Counseling:  I discussed with the patient the risks of azathioprine including but not limited to myelosuppression, immunosuppression, hepatotoxicity, lymphoma, and infections.  The patient understands that monitoring is required including baseline LFTs, Creatinine, possible TPMP genotyping and weekly CBCs for the first month and then every 2 weeks thereafter.  The patient verbalized understanding of the proper use and possible adverse effects of azathioprine.  All of the patient's questions and concerns were addressed.
Oxybutynin Counseling:  I discussed with the patient the risks of oxybutynin including but not limited to skin rash, drowsiness, dry mouth, difficulty urinating, and blurred vision.
Drysol Pregnancy And Lactation Text: This medication is considered safe during pregnancy and breast feeding.
Zyclara Counseling:  I discussed with the patient the risks of imiquimod including but not limited to erythema, scaling, itching, weeping, crusting, and pain.  Patient understands that the inflammatory response to imiquimod is variable from person to person and was educated regarded proper titration schedule.  If flu-like symptoms develop, patient knows to discontinue the medication and contact us.
Methotrexate Counseling:  Patient counseled regarding adverse effects of methotrexate including but not limited to nausea, vomiting, abnormalities in liver function tests. Patients may develop mouth sores, rash, diarrhea, and abnormalities in blood counts. The patient understands that monitoring is required including LFT's and blood counts.  There is a rare possibility of scarring of the liver and lung problems that can occur when taking methotrexate. Persistent nausea, loss of appetite, pale stools, dark urine, cough, and shortness of breath should be reported immediately. Patient advised to discontinue methotrexate treatment at least three months before attempting to become pregnant.  I discussed the need for folate supplements while taking methotrexate.  These supplements can decrease side effects during methotrexate treatment. The patient verbalized understanding of the proper use and possible adverse effects of methotrexate.  All of the patient's questions and concerns were addressed.
Dupixent Counseling: I discussed with the patient the risks of dupilumab including but not limited to eye infection and irritation, cold sores, injection site reactions, worsening of asthma, allergic reactions and increased risk of parasitic infection.  Live vaccines should be avoided while taking dupilumab. Dupilumab will also interact with certain medications such as warfarin and cyclosporine. The patient understands that monitoring is required and they must alert us or the primary physician if symptoms of infection or other concerning signs are noted.
Dapsone Counseling: I discussed with the patient the risks of dapsone including but not limited to hemolytic anemia, agranulocytosis, rashes, methemoglobinemia, kidney failure, peripheral neuropathy, headaches, GI upset, and liver toxicity.  Patients who start dapsone require monitoring including baseline LFTs and weekly CBCs for the first month, then every month thereafter.  The patient verbalized understanding of the proper use and possible adverse effects of dapsone.  All of the patient's questions and concerns were addressed.
Rituxan Pregnancy And Lactation Text: This medication is Pregnancy Category C and it isn't know if it is safe during pregnancy. It is unknown if this medication is excreted in breast milk but similar antibodies are known to be excreted.
Metronidazole Counseling:  I discussed with the patient the risks of metronidazole including but not limited to seizures, nausea/vomiting, a metallic taste in the mouth, nausea/vomiting and severe allergy.
Dupixent Pregnancy And Lactation Text: This medication likely crosses the placenta but the risk for the fetus is uncertain. This medication is excreted in breast milk.
Cephalexin Counseling: I counseled the patient regarding use of cephalexin as an antibiotic for prophylactic and/or therapeutic purposes. Cephalexin (commonly prescribed under brand name Keflex) is a cephalosporin antibiotic which is active against numerous classes of bacteria, including most skin bacteria. Side effects may include nausea, diarrhea, gastrointestinal upset, rash, hives, yeast infections, and in rare cases, hepatitis, kidney disease, seizures, fever, confusion, neurologic symptoms, and others. Patients with severe allergies to penicillin medications are cautioned that there is about a 10% incidence of cross-reactivity with cephalosporins. When possible, patients with penicillin allergies should use alternatives to cephalosporins for antibiotic therapy.
Isotretinoin Counseling: Patient should get monthly blood tests, not donate blood, not drive at night if vision affected, not share medication, and not undergo elective surgery for 6 months after tx completed. Side effects reviewed, pt to contact office should one occur.
Topical Retinoid counseling:  Patient advised to apply a pea-sized amount only at bedtime and wait 30 minutes after washing their face before applying.  If too drying, patient may add a non-comedogenic moisturizer. The patient verbalized understanding of the proper use and possible adverse effects of retinoids.  All of the patient's questions and concerns were addressed.
Tetracycline Counseling: Patient counseled regarding possible photosensitivity and increased risk for sunburn.  Patient instructed to avoid sunlight, if possible.  When exposed to sunlight, patients should wear protective clothing, sunglasses, and sunscreen.  The patient was instructed to call the office immediately if the following severe adverse effects occur:  hearing changes, easy bruising/bleeding, severe headache, or vision changes.  The patient verbalized understanding of the proper use and possible adverse effects of tetracycline.  All of the patient's questions and concerns were addressed. Patient understands to avoid pregnancy while on therapy due to potential birth defects.
Hydroxychloroquine Counseling:  I discussed with the patient that a baseline ophthalmologic exam is needed at the start of therapy and every year thereafter while on therapy. A CBC may also be warranted for monitoring.  The side effects of this medication were discussed with the patient, including but not limited to agranulocytosis, aplastic anemia, seizures, rashes, retinopathy, and liver toxicity. Patient instructed to call the office should any adverse effect occur.  The patient verbalized understanding of the proper use and possible adverse effects of Plaquenil.  All the patient's questions and concerns were addressed.
Doxepin Counseling:  Patient advised that the medication is sedating and not to drive a car after taking this medication. Patient informed of potential adverse effects including but not limited to dry mouth, urinary retention, and blurry vision.  The patient verbalized understanding of the proper use and possible adverse effects of doxepin.  All of the patient's questions and concerns were addressed.
Elidel Counseling: Patient may experience a mild burning sensation during topical application. Elidel is not approved in children less than 2 years of age. There have been case reports of hematologic and skin malignancies in patients using topical calcineurin inhibitors although causality is questionable.
Doxepin Pregnancy And Lactation Text: This medication is Pregnancy Category C and it isn't known if it is safe during pregnancy. It is also excreted in breast milk and breast feeding isn't recommended.
Methotrexate Pregnancy And Lactation Text: This medication is Pregnancy Category X and is known to cause fetal harm. This medication is excreted in breast milk.
Minoxidil Counseling: Minoxidil is a topical medication which can increase blood flow where it is applied. It is uncertain how this medication increases hair growth. Side effects are uncommon and include stinging and allergic reactions.
Cephalexin Pregnancy And Lactation Text: This medication is Pregnancy Category B and considered safe during pregnancy.  It is also excreted in breast milk but can be used safely for shorter doses.
Valtrex Counseling: I discussed with the patient the risks of valacyclovir including but not limited to kidney damage, nausea, vomiting and severe allergy.  The patient understands that if the infection seems to be worsening or is not improving, they are to call.
Tremfya Counseling: I discussed with the patient the risks of guselkumab including but not limited to immunosuppression, serious infections, worsening of inflammatory bowel disease and drug reactions.  The patient understands that monitoring is required including a PPD at baseline and must alert us or the primary physician if symptoms of infection or other concerning signs are noted.
Ketoconazole Counseling:   Patient counseled regarding improving absorption with orange juice.  Adverse effects include but are not limited to breast enlargement, headache, diarrhea, nausea, upset stomach, liver function test abnormalities, taste disturbance, and stomach pain.  There is a rare possibility of liver failure that can occur when taking ketoconazole. The patient understands that monitoring of LFTs may be required, especially at baseline. The patient verbalized understanding of the proper use and possible adverse effects of ketoconazole.  All of the patient's questions and concerns were addressed.
Isotretinoin Pregnancy And Lactation Text: This medication is Pregnancy Category X and is considered extremely dangerous during pregnancy. It is unknown if it is excreted in breast milk.
Metronidazole Pregnancy And Lactation Text: This medication is Pregnancy Category B and considered safe during pregnancy.  It is also excreted in breast milk.
Hydroxychloroquine Pregnancy And Lactation Text: This medication has been shown to cause fetal harm but it isn't assigned a Pregnancy Risk Category. There are small amounts excreted in breast milk.
Cellcept Counseling:  I discussed with the patient the risks of mycophenolate mofetil including but not limited to infection/immunosuppression, GI upset, hypokalemia, hypercholesterolemia, bone marrow suppression, lymphoproliferative disorders, malignancy, GI ulceration/bleed/perforation, colitis, interstitial lung disease, kidney failure, progressive multifocal leukoencephalopathy, and birth defects.  The patient understands that monitoring is required including a baseline creatinine and regular CBC testing. In addition, patient must alert us immediately if symptoms of infection or other concerning signs are noted.
Birth Control Pills Counseling: Birth Control Pill Counseling: I discussed with the patient the potential side effects of OCPs including but not limited to increased risk of stroke, heart attack, thrombophlebitis, deep venous thrombosis, hepatic adenomas, breast changes, GI upset, headaches, and depression.  The patient verbalized understanding of the proper use and possible adverse effects of OCPs. All of the patient's questions and concerns were addressed.
Siliq Counseling:  I discussed with the patient the risks of Siliq including but not limited to new or worsening depression, suicidal thoughts and behavior, immunosuppression, malignancy, posterior leukoencephalopathy syndrome, and serious infections.  The patient understands that monitoring is required including a PPD at baseline and must alert us or the primary physician if symptoms of infection or other concerning signs are noted. There is also a special program designed to monitor depression which is required with Siliq.
Prednisone Counseling:  I discussed with the patient the risks of prolonged use of prednisone including but not limited to weight gain, insomnia, osteoporosis, mood changes, diabetes, susceptibility to infection, glaucoma and high blood pressure.  In cases where prednisone use is prolonged, patients should be monitored with blood pressure checks, serum glucose levels and an eye exam.  Additionally, the patient may need to be placed on GI prophylaxis, PCP prophylaxis, and calcium and vitamin D supplementation and/or a bisphosphonate.  The patient verbalized understanding of the proper use and the possible adverse effects of prednisone.  All of the patient's questions and concerns were addressed.
Enbrel Counseling:  I discussed with the patient the risks of etanercept including but not limited to myelosuppression, immunosuppression, autoimmune hepatitis, demyelinating diseases, lymphoma, and infections.  The patient understands that monitoring is required including a PPD at baseline and must alert us or the primary physician if symptoms of infection or other concerning signs are noted.
Dapsone Pregnancy And Lactation Text: This medication is Pregnancy Category C and is not considered safe during pregnancy or breast feeding.
Minocycline Counseling: Patient advised regarding possible photosensitivity and discoloration of the teeth, skin, lips, tongue and gums.  Patient instructed to avoid sunlight, if possible.  When exposed to sunlight, patients should wear protective clothing, sunglasses, and sunscreen.  The patient was instructed to call the office immediately if the following severe adverse effects occur:  hearing changes, easy bruising/bleeding, severe headache, or vision changes.  The patient verbalized understanding of the proper use and possible adverse effects of minocycline.  All of the patient's questions and concerns were addressed.
Erivedge Counseling- I discussed with the patient the risks of Erivedge including but not limited to nausea, vomiting, diarrhea, constipation, weight loss, changes in the sense of taste, decreased appetite, muscle spasms, and hair loss.  The patient verbalized understanding of the proper use and possible adverse effects of Erivedge.  All of the patient's questions and concerns were addressed.
Ketoconazole Pregnancy And Lactation Text: This medication is Pregnancy Category C and it isn't know if it is safe during pregnancy. It is also excreted in breast milk and breast feeding isn't recommended.
Valtrex Pregnancy And Lactation Text: this medication is Pregnancy Category B and is considered safe during pregnancy. This medication is not directly found in breast milk but it's metabolite acyclovir is present.
High Dose Vitamin A Counseling: Side effects reviewed, pt to contact office should one occur.
Hydroxyzine Counseling: Patient advised that the medication is sedating and not to drive a car after taking this medication.  Patient informed of potential adverse effects including but not limited to dry mouth, urinary retention, and blurry vision.  The patient verbalized understanding of the proper use and possible adverse effects of hydroxyzine.  All of the patient's questions and concerns were addressed.
Nsaids Counseling: NSAID Counseling: I discussed with the patient that NSAIDs should be taken with food. Prolonged use of NSAIDs can result in the development of stomach ulcers.  Patient advised to stop taking NSAIDs if abdominal pain occurs.  The patient verbalized understanding of the proper use and possible adverse effects of NSAIDs.  All of the patient's questions and concerns were addressed.
Benzoyl Peroxide Pregnancy And Lactation Text: This medication is Pregnancy Category C. It is unknown if benzoyl peroxide is excreted in breast milk.
Tazorac Counseling:  Patient advised that medication is irritating and drying.  Patient may need to apply sparingly and wash off after an hour before eventually leaving it on overnight.  The patient verbalized understanding of the proper use and possible adverse effects of tazorac.  All of the patient's questions and concerns were addressed.

## 2018-12-07 NOTE — PROCEDURE: HIGH RISK MEDICATION MONITORING
Cyclosporine Pregnancy And Lactation Text: This medication is Pregnancy Category C and it isn't know if it is safe during pregnancy. This medication is excreted in breast milk.
Tetracycline Counseling: Patient counseled regarding possible photosensitivity and increased risk for sunburn.  Patient instructed to avoid sunlight, if possible.  When exposed to sunlight, patients should wear protective clothing, sunglasses, and sunscreen.  The patient was instructed to call the office immediately if the following severe adverse effects occur:  hearing changes, easy bruising/bleeding, severe headache, or vision changes.  The patient verbalized understanding of the proper use and possible adverse effects of tetracycline.  All of the patient's questions and concerns were addressed. Patient understands to avoid pregnancy while on therapy due to potential birth defects.
Simponi Pregnancy And Lactation Text: The risk during pregnancy and breastfeeding is uncertain with this medication.
SSKI Counseling:  I discussed with the patient the risks of SSKI including but not limited to thyroid abnormalities, metallic taste, GI upset, fever, headache, acne, arthralgias, paraesthesias, lymphadenopathy, easy bleeding, arrhythmias, and allergic reaction.
Cellcept Counseling:  I discussed with the patient the risks of mycophenolate mofetil including but not limited to infection/immunosuppression, GI upset, hypokalemia, hypercholesterolemia, bone marrow suppression, lymphoproliferative disorders, malignancy, GI ulceration/bleed/perforation, colitis, interstitial lung disease, kidney failure, progressive multifocal leukoencephalopathy, and birth defects.  The patient understands that monitoring is required including a baseline creatinine and regular CBC testing. In addition, patient must alert us immediately if symptoms of infection or other concerning signs are noted.
Clofazimine Pregnancy And Lactation Text: This medication is Pregnancy Category C and isn't considered safe during pregnancy. It is excreted in breast milk.
Nsaids Pregnancy And Lactation Text: These medications are considered safe up to 30 weeks gestation. It is excreted in breast milk.
Xelkymz Pregnancy And Lactation Text: This medication is Pregnancy Category D and is not considered safe during pregnancy.  The risk during breast feeding is also uncertain.
Rifampin Pregnancy And Lactation Text: This medication is Pregnancy Category C and it isn't know if it is safe during pregnancy. It is also excreted in breast milk and should not be used if you are breast feeding.
Erivedge Counseling- I discussed with the patient the risks of Erivedge including but not limited to nausea, vomiting, diarrhea, constipation, weight loss, changes in the sense of taste, decreased appetite, muscle spasms, and hair loss.  The patient verbalized understanding of the proper use and possible adverse effects of Erivedge.  All of the patient's questions and concerns were addressed.
Cephalexin Pregnancy And Lactation Text: This medication is Pregnancy Category B and considered safe during pregnancy.  It is also excreted in breast milk but can be used safely for shorter doses.
Topical Clindamycin Pregnancy And Lactation Text: This medication is Pregnancy Category B and is considered safe during pregnancy. It is unknown if it is excreted in breast milk.
Ivermectin Counseling:  Patient instructed to take medication on an empty stomach with a full glass of water.  Patient informed of potential adverse effects including but not limited to nausea, diarrhea, dizziness, itching, and swelling of the extremities or lymph nodes.  The patient verbalized understanding of the proper use and possible adverse effects of ivermectin.  All of the patient's questions and concerns were addressed.
Dupixent Pregnancy And Lactation Text: This medication likely crosses the placenta but the risk for the fetus is uncertain. This medication is excreted in breast milk.
Griseofulvin Counseling:  I discussed with the patient the risks of griseofulvin including but not limited to photosensitivity, cytopenia, liver damage, nausea/vomiting and severe allergy.  The patient understands that this medication is best absorbed when taken with a fatty meal (e.g., ice cream or french fries).
Azathioprine Counseling:  I discussed with the patient the risks of azathioprine including but not limited to myelosuppression, immunosuppression, hepatotoxicity, lymphoma, and infections.  The patient understands that monitoring is required including baseline LFTs, Creatinine, possible TPMP genotyping and weekly CBCs for the first month and then every 2 weeks thereafter.  The patient verbalized understanding of the proper use and possible adverse effects of azathioprine.  All of the patient's questions and concerns were addressed.
Siliq Counseling:  I discussed with the patient the risks of Siliq including but not limited to new or worsening depression, suicidal thoughts and behavior, immunosuppression, malignancy, posterior leukoencephalopathy syndrome, and serious infections.  The patient understands that monitoring is required including a PPD at baseline and must alert us or the primary physician if symptoms of infection or other concerning signs are noted. There is also a special program designed to monitor depression which is required with Siliq.
Erivedge Pregnancy And Lactation Text: This medication is Pregnancy Category X and is absolutely contraindicated during pregnancy. It is unknown if it is excreted in breast milk.
Cellcept Pregnancy And Lactation Text: This medication is Pregnancy Category D and isn't considered safe during pregnancy. It is unknown if this medication is excreted in breast milk.
Isotretinoin Pregnancy And Lactation Text: This medication is Pregnancy Category X and is considered extremely dangerous during pregnancy. It is unknown if it is excreted in breast milk.
Hydroquinone Counseling:  Patient advised that medication may result in skin irritation, lightening (hypopigmentation), dryness, and burning.  In the event of skin irritation, the patient was advised to reduce the amount of the drug applied or use it less frequently.  Rarely, spots that are treated with hydroquinone can become darker (pseudoochronosis).  Should this occur, patient instructed to stop medication and call the office. The patient verbalized understanding of the proper use and possible adverse effects of hydroquinone.  All of the patient's questions and concerns were addressed.
Taltz Counseling: I discussed with the patient the risks of ixekizumab including but not limited to immunosuppression, serious infections, worsening of inflammatory bowel disease and drug reactions.  The patient understands that monitoring is required including a PPD at baseline and must alert us or the primary physician if symptoms of infection or other concerning signs are noted.
Doxepin Pregnancy And Lactation Text: This medication is Pregnancy Category C and it isn't known if it is safe during pregnancy. It is also excreted in breast milk and breast feeding isn't recommended.
Imiquimod Pregnancy And Lactation Text: This medication is Pregnancy Category C. It is unknown if this medication is excreted in breast milk.
Rituxan Pregnancy And Lactation Text: This medication is Pregnancy Category C and it isn't know if it is safe during pregnancy. It is unknown if this medication is excreted in breast milk but similar antibodies are known to be excreted.
Itraconazole Counseling:  I discussed with the patient the risks of itraconazole including but not limited to liver damage, nausea/vomiting, neuropathy, and severe allergy.  The patient understands that this medication is best absorbed when taken with acidic beverages such as non-diet cola or ginger ale.  The patient understands that monitoring is required including baseline LFTs and repeat LFTs at intervals.  The patient understands that they are to contact us or the primary physician if concerning signs are noted.
Ketoconazole Pregnancy And Lactation Text: This medication is Pregnancy Category C and it isn't know if it is safe during pregnancy. It is also excreted in breast milk and breast feeding isn't recommended.
Cimzia Pregnancy And Lactation Text: This medication crosses the placenta but can be considered safe in certain situations. Cimzia may be excreted in breast milk.
Clindamycin Pregnancy And Lactation Text: This medication can be used in pregnancy if certain situations. Clindamycin is also present in breast milk.
Benzoyl Peroxide Pregnancy And Lactation Text: This medication is Pregnancy Category C. It is unknown if benzoyl peroxide is excreted in breast milk.
Cimzia Counseling:  I discussed with the patient the risks of Cimzia including but not limited to immunosuppression, allergic reactions and infections.  The patient understands that monitoring is required including a PPD at baseline and must alert us or the primary physician if symptoms of infection or other concerning signs are noted.
Acitretin Counseling:  I discussed with the patient the risks of acitretin including but not limited to hair loss, dry lips/skin/eyes, liver damage, hyperlipidemia, depression/suicidal ideation, photosensitivity.  Serious rare side effects can include but are not limited to pancreatitis, pseudotumor cerebri, bony changes, clot formation/stroke/heart attack.  Patient understands that alcohol is contraindicated since it can result in liver toxicity and significantly prolong the elimination of the drug by many years.
Bactrim Pregnancy And Lactation Text: This medication is Pregnancy Category D and is known to cause fetal risk.  It is also excreted in breast milk.
Arava Counseling:  Patient counseled regarding adverse effects of Arava including but not limited to nausea, vomiting, abnormalities in liver function tests. Patients may develop mouth sores, rash, diarrhea, and abnormalities in blood counts. The patient understands that monitoring is required including LFTs and blood counts.  There is a rare possibility of scarring of the liver and lung problems that can occur when taking methotrexate. Persistent nausea, loss of appetite, pale stools, dark urine, cough, and shortness of breath should be reported immediately. Patient advised to discontinue Arava treatment and consult with a physician prior to attempting conception. The patient will have to undergo a treatment to eliminate Arava from the body prior to conception.
5-Fu Pregnancy And Lactation Text: This medication is Pregnancy Category X and contraindicated in pregnancy and in women who may become pregnant. It is unknown if this medication is excreted in breast milk.
Cyclophosphamide Counseling:  I discussed with the patient the risks of cyclophosphamide including but not limited to hair loss, hormonal abnormalities, decreased fertility, abdominal pain, diarrhea, nausea and vomiting, bone marrow suppression and infection. The patient understands that monitoring is required while taking this medication.
Cimetidine Counseling:  I discussed with the patient the risks of Cimetidine including but not limited to gynecomastia, headache, diarrhea, nausea, drowsiness, arrhythmias, pancreatitis, skin rashes, psychosis, bone marrow suppression and kidney toxicity.
High Dose Vitamin A Pregnancy And Lactation Text: High dose vitamin A therapy is contraindicated during pregnancy and breast feeding.
Infliximab Counseling:  I discussed with the patient the risks of infliximab including but not limited to myelosuppression, immunosuppression, autoimmune hepatitis, demyelinating diseases, lymphoma, and serious infections.  The patient understands that monitoring is required including a PPD at baseline and must alert us or the primary physician if symptoms of infection or other concerning signs are noted.
Terbinafine Pregnancy And Lactation Text: This medication is Pregnancy Category B and is considered safe during pregnancy. It is also excreted in breast milk and breast feeding isn't recommended.
Detail Level: Detailed
Hydroxychloroquine Counseling:  I discussed with the patient that a baseline ophthalmologic exam is needed at the start of therapy and every year thereafter while on therapy. A CBC may also be warranted for monitoring.  The side effects of this medication were discussed with the patient, including but not limited to agranulocytosis, aplastic anemia, seizures, rashes, retinopathy, and liver toxicity. Patient instructed to call the office should any adverse effect occur.  The patient verbalized understanding of the proper use and possible adverse effects of Plaquenil.  All the patient's questions and concerns were addressed.
Cosentyx Pregnancy And Lactation Text: This medication is Pregnancy Category B and is considered safe during pregnancy. It is unknown if this medication is excreted in breast milk.
Tazorac Counseling:  Patient advised that medication is irritating and drying.  Patient may need to apply sparingly and wash off after an hour before eventually leaving it on overnight.  The patient verbalized understanding of the proper use and possible adverse effects of tazorac.  All of the patient's questions and concerns were addressed.
Carac Counseling:  I discussed with the patient the risks of Carac including but not limited to erythema, scaling, itching, weeping, crusting, and pain.
Acitretin Pregnancy And Lactation Text: This medication is Pregnancy Category X and should not be given to women who are pregnant or may become pregnant in the future. This medication is excreted in breast milk.
Spironolactone Counseling: Patient advised regarding risks of diarrhea, abdominal pain, hyperkalemia, birth defects (for female patients), liver toxicity and renal toxicity. The patient may need blood work to monitor liver and kidney function and potassium levels while on therapy. The patient verbalized understanding of the proper use and possible adverse effects of spironolactone.  All of the patient's questions and concerns were addressed.
Glycopyrrolate Counseling:  I discussed with the patient the risks of glycopyrrolate including but not limited to skin rash, drowsiness, dry mouth, difficulty urinating, and blurred vision.
Benzoyl Peroxide Counseling: Patient counseled that medicine may cause skin irritation and bleach clothing.  In the event of skin irritation, the patient was advised to reduce the amount of the drug applied or use it less frequently.   The patient verbalized understanding of the proper use and possible adverse effects of benzoyl peroxide.  All of the patient's questions and concerns were addressed.
Valtrex Counseling: I discussed with the patient the risks of valacyclovir including but not limited to kidney damage, nausea, vomiting and severe allergy.  The patient understands that if the infection seems to be worsening or is not improving, they are to call.
Oxybutynin Counseling:  I discussed with the patient the risks of oxybutynin including but not limited to skin rash, drowsiness, dry mouth, difficulty urinating, and blurred vision.
Prednisone Counseling:  I discussed with the patient the risks of prolonged use of prednisone including but not limited to weight gain, insomnia, osteoporosis, mood changes, diabetes, susceptibility to infection, glaucoma and high blood pressure.  In cases where prednisone use is prolonged, patients should be monitored with blood pressure checks, serum glucose levels and an eye exam.  Additionally, the patient may need to be placed on GI prophylaxis, PCP prophylaxis, and calcium and vitamin D supplementation and/or a bisphosphonate.  The patient verbalized understanding of the proper use and the possible adverse effects of prednisone.  All of the patient's questions and concerns were addressed.
Cephalexin Counseling: I counseled the patient regarding use of cephalexin as an antibiotic for prophylactic and/or therapeutic purposes. Cephalexin (commonly prescribed under brand name Keflex) is a cephalosporin antibiotic which is active against numerous classes of bacteria, including most skin bacteria. Side effects may include nausea, diarrhea, gastrointestinal upset, rash, hives, yeast infections, and in rare cases, hepatitis, kidney disease, seizures, fever, confusion, neurologic symptoms, and others. Patients with severe allergies to penicillin medications are cautioned that there is about a 10% incidence of cross-reactivity with cephalosporins. When possible, patients with penicillin allergies should use alternatives to cephalosporins for antibiotic therapy.
Metronidazole Pregnancy And Lactation Text: This medication is Pregnancy Category B and considered safe during pregnancy.  It is also excreted in breast milk.
Hydroxyzine Pregnancy And Lactation Text: This medication is not safe during pregnancy and should not be taken. It is also excreted in breast milk and breast feeding isn't recommended.
Colchicine Counseling:  Patient counseled regarding adverse effects including but not limited to stomach upset (nausea, vomiting, stomach pain, or diarrhea).  Patient instructed to limit alcohol consumption while taking this medication.  Colchicine may reduce blood counts especially with prolonged use.  The patient understands that monitoring of kidney function and blood counts may be required, especially at baseline. The patient verbalized understanding of the proper use and possible adverse effects of colchicine.  All of the patient's questions and concerns were addressed.
5-Fu Counseling: 5-Fluorouracil Counseling:  I discussed with the patient the risks of 5-fluorouracil including but not limited to erythema, scaling, itching, weeping, crusting, and pain.
Picato Counseling:  I discussed with the patient the risks of Picato including but not limited to erythema, scaling, itching, weeping, crusting, and pain.
Valtrex Pregnancy And Lactation Text: this medication is Pregnancy Category B and is considered safe during pregnancy. This medication is not directly found in breast milk but it's metabolite acyclovir is present.
Metronidazole Counseling:  I discussed with the patient the risks of metronidazole including but not limited to seizures, nausea/vomiting, a metallic taste in the mouth, nausea/vomiting and severe allergy.
Albendazole Counseling:  I discussed with the patient the risks of albendazole including but not limited to cytopenia, kidney damage, nausea/vomiting and severe allergy.  The patient understands that this medication is being used in an off-label manner.
Thalidomide Counseling: I discussed with the patient the risks of thalidomide including but not limited to birth defects, anxiety, weakness, chest pain, dizziness, cough and severe allergy.
Quinolones Pregnancy And Lactation Text: This medication is Pregnancy Category C and it isn't know if it is safe during pregnancy. It is also excreted in breast milk.
Minocycline Pregnancy And Lactation Text: This medication is Pregnancy Category D and not consider safe during pregnancy. It is also excreted in breast milk.
Cosentyx Counseling:  I discussed with the patient the risks of Cosentyx including but not limited to worsening of Crohn's disease, immunosuppression, allergic reactions and infections.  The patient understands that monitoring is required including a PPD at baseline and must alert us or the primary physician if symptoms of infection or other concerning signs are noted.
Griseofulvin Pregnancy And Lactation Text: This medication is Pregnancy Category X and is known to cause serious birth defects. It is unknown if this medication is excreted in breast milk but breast feeding should be avoided.
Hydroxyzine Counseling: Patient advised that the medication is sedating and not to drive a car after taking this medication.  Patient informed of potential adverse effects including but not limited to dry mouth, urinary retention, and blurry vision.  The patient verbalized understanding of the proper use and possible adverse effects of hydroxyzine.  All of the patient's questions and concerns were addressed.
Dupixent Counseling: I discussed with the patient the risks of dupilumab including but not limited to eye infection and irritation, cold sores, injection site reactions, worsening of asthma, allergic reactions and increased risk of parasitic infection.  Live vaccines should be avoided while taking dupilumab. Dupilumab will also interact with certain medications such as warfarin and cyclosporine. The patient understands that monitoring is required and they must alert us or the primary physician if symptoms of infection or other concerning signs are noted.
Rituxan Counseling:  I discussed with the patient the risks of Rituxan infusions. Side effects can include infusion reactions, severe drug rashes including mucocutaneous reactions, reactivation of latent hepatitis and other infections and rarely progressive multifocal leukoencephalopathy.  All of the patient's questions and concerns were addressed.
Tremfya Counseling: I discussed with the patient the risks of guselkumab including but not limited to immunosuppression, serious infections, worsening of inflammatory bowel disease and drug reactions.  The patient understands that monitoring is required including a PPD at baseline and must alert us or the primary physician if symptoms of infection or other concerning signs are noted.
Azithromycin Counseling:  I discussed with the patient the risks of azithromycin including but not limited to GI upset, allergic reaction, drug rash, diarrhea, and yeast infections.
Azithromycin Pregnancy And Lactation Text: This medication is considered safe during pregnancy and is also secreted in breast milk.
Topical Sulfur Applications Pregnancy And Lactation Text: This medication is Pregnancy Category C and has an unknown safety profile during pregnancy. It is unknown if this topical medication is excreted in breast milk.
Fluconazole Counseling:  Patient counseled regarding adverse effects of fluconazole including but not limited to headache, diarrhea, nausea, upset stomach, liver function test abnormalities, taste disturbance, and stomach pain.  There is a rare possibility of liver failure that can occur when taking fluconazole.  The patient understands that monitoring of LFTs and kidney function test may be required, especially at baseline. The patient verbalized understanding of the proper use and possible adverse effects of fluconazole.  All of the patient's questions and concerns were addressed.
Doxepin Counseling:  Patient advised that the medication is sedating and not to drive a car after taking this medication. Patient informed of potential adverse effects including but not limited to dry mouth, urinary retention, and blurry vision.  The patient verbalized understanding of the proper use and possible adverse effects of doxepin.  All of the patient's questions and concerns were addressed.
Eucrisa Pregnancy And Lactation Text: This medication has not been assigned a Pregnancy Risk Category but animal studies failed to show danger with the topical medication. It is unknown if the medication is excreted in breast milk.
Protopic Pregnancy And Lactation Text: This medication is Pregnancy Category C. It is unknown if this medication is excreted in breast milk when applied topically.
Xolair Pregnancy And Lactation Text: This medication is Pregnancy Category B and is considered safe during pregnancy. This medication is excreted in breast milk.
Drysol Pregnancy And Lactation Text: This medication is considered safe during pregnancy and breast feeding.
Enbrel Counseling:  I discussed with the patient the risks of etanercept including but not limited to myelosuppression, immunosuppression, autoimmune hepatitis, demyelinating diseases, lymphoma, and infections.  The patient understands that monitoring is required including a PPD at baseline and must alert us or the primary physician if symptoms of infection or other concerning signs are noted.
Dapsone Pregnancy And Lactation Text: This medication is Pregnancy Category C and is not considered safe during pregnancy or breast feeding.
Sski Pregnancy And Lactation Text: This medication is Pregnancy Category D and isn't considered safe during pregnancy. It is excreted in breast milk.
Isotretinoin Counseling: Patient should get monthly blood tests, not donate blood, not drive at night if vision affected, not share medication, and not undergo elective surgery for 6 months after tx completed. Side effects reviewed, pt to contact office should one occur.
Gabapentin Counseling: I discussed with the patient the risks of gabapentin including but not limited to dizziness, somnolence, fatigue and ataxia.
Doxycycline Pregnancy And Lactation Text: This medication is Pregnancy Category D and not consider safe during pregnancy. It is also excreted in breast milk but is considered safe for shorter treatment courses.
Cyclophosphamide Pregnancy And Lactation Text: This medication is Pregnancy Category D and it isn't considered safe during pregnancy. This medication is excreted in breast milk.
Humira Counseling:  I discussed with the patient the risks of adalimumab including but not limited to myelosuppression, immunosuppression, autoimmune hepatitis, demyelinating diseases, lymphoma, and serious infections.  The patient understands that monitoring is required including a PPD at baseline and must alert us or the primary physician if symptoms of infection or other concerning signs are noted.
Bexarotene Pregnancy And Lactation Text: This medication is Pregnancy Category X and should not be given to women who are pregnant or may become pregnant. This medication should not be used if you are breast feeding.
Methotrexate Pregnancy And Lactation Text: This medication is Pregnancy Category X and is known to cause fetal harm. This medication is excreted in breast milk.
Xolair Counseling:  Patient informed of potential adverse effects including but not limited to fever, muscle aches, rash and allergic reactions.  The patient verbalized understanding of the proper use and possible adverse effects of Xolair.  All of the patient's questions and concerns were addressed.
Zyclara Counseling:  I discussed with the patient the risks of imiquimod including but not limited to erythema, scaling, itching, weeping, crusting, and pain.  Patient understands that the inflammatory response to imiquimod is variable from person to person and was educated regarded proper titration schedule.  If flu-like symptoms develop, patient knows to discontinue the medication and contact us.
Cyclosporine Counseling:  I discussed with the patient the risks of cyclosporine including but not limited to hypertension, gingival hyperplasia,myelosuppression, immunosuppression, liver damage, kidney damage, neurotoxicity, lymphoma, and serious infections. The patient understands that monitoring is required including baseline blood pressure, CBC, CMP, lipid panel and uric acid, and then 1-2 times monthly CMP and blood pressure.
Terbinafine Counseling: Patient counseling regarding adverse effects of terbinafine including but not limited to headache, diarrhea, rash, upset stomach, liver function test abnormalities, itching, taste/smell disturbance, nausea, abdominal pain, and flatulence.  There is a rare possibility of liver failure that can occur when taking terbinafine.  The patient understands that a baseline LFT and kidney function test may be required. The patient verbalized understanding of the proper use and possible adverse effects of terbinafine.  All of the patient's questions and concerns were addressed.
Erythromycin Counseling:  I discussed with the patient the risks of erythromycin including but not limited to GI upset, allergic reaction, drug rash, diarrhea, increase in liver enzymes, and yeast infections.
Nsaids Counseling: NSAID Counseling: I discussed with the patient that NSAIDs should be taken with food. Prolonged use of NSAIDs can result in the development of stomach ulcers.  Patient advised to stop taking NSAIDs if abdominal pain occurs.  The patient verbalized understanding of the proper use and possible adverse effects of NSAIDs.  All of the patient's questions and concerns were addressed.
Clofazimine Counseling:  I discussed with the patient the risks of clofazimine including but not limited to skin and eye pigmentation, liver damage, nausea/vomiting, gastrointestinal bleeding and allergy.
Bexarotene Counseling:  I discussed with the patient the risks of bexarotene including but not limited to hair loss, dry lips/skin/eyes, liver abnormalities, hyperlipidemia, pancreatitis, depression/suicidal ideation, photosensitivity, drug rash/allergic reactions, hypothyroidism, anemia, leukopenia, infection, cataracts, and teratogenicity.  Patient understands that they will need regular blood tests to check lipid profile, liver function tests, white blood cell count, thyroid function tests and pregnancy test if applicable.
Clindamycin Counseling: I counseled the patient regarding use of clindamycin as an antibiotic for prophylactic and/or therapeutic purposes. Clindamycin is active against numerous classes of bacteria, including skin bacteria. Side effects may include nausea, diarrhea, gastrointestinal upset, rash, hives, yeast infections, and in rare cases, colitis.
Elidel Counseling: Patient may experience a mild burning sensation during topical application. Elidel is not approved in children less than 2 years of age. There have been case reports of hematologic and skin malignancies in patients using topical calcineurin inhibitors although causality is questionable.
Glycopyrrolate Pregnancy And Lactation Text: This medication is Pregnancy Category B and is considered safe during pregnancy. It is unknown if it is excreted breast milk.
Otezla Counseling: The side effects of Otezla were discussed with the patient, including but not limited to worsening or new depression, weight loss, diarrhea, nausea, upper respiratory tract infection, and headache. Patient instructed to call the office should any adverse effect occur.  The patient verbalized understanding of the proper use and possible adverse effects of Otezla.  All the patient's questions and concerns were addressed.
Xeljanz Counseling: I discussed with the patient the risks of Xeljanz therapy including increased risk of infection, liver issues, headache, diarrhea, or cold symptoms. Live vaccines should be avoided. They were instructed to call if they have any problems.
Ketoconazole Counseling:   Patient counseled regarding improving absorption with orange juice.  Adverse effects include but are not limited to breast enlargement, headache, diarrhea, nausea, upset stomach, liver function test abnormalities, taste disturbance, and stomach pain.  There is a rare possibility of liver failure that can occur when taking ketoconazole. The patient understands that monitoring of LFTs may be required, especially at baseline. The patient verbalized understanding of the proper use and possible adverse effects of ketoconazole.  All of the patient's questions and concerns were addressed.
Doxycycline Counseling:  Patient counseled regarding possible photosensitivity and increased risk for sunburn.  Patient instructed to avoid sunlight, if possible.  When exposed to sunlight, patients should wear protective clothing, sunglasses, and sunscreen.  The patient was instructed to call the office immediately if the following severe adverse effects occur:  hearing changes, easy bruising/bleeding, severe headache, or vision changes.  The patient verbalized understanding of the proper use and possible adverse effects of doxycycline.  All of the patient's questions and concerns were addressed.
Otezla Pregnancy And Lactation Text: This medication is Pregnancy Category C and it isn't known if it is safe during pregnancy. It is unknown if it is excreted in breast milk.
Simponi Counseling:  I discussed with the patient the risks of golimumab including but not limited to myelosuppression, immunosuppression, autoimmune hepatitis, demyelinating diseases, lymphoma, and serious infections.  The patient understands that monitoring is required including a PPD at baseline and must alert us or the primary physician if symptoms of infection or other concerning signs are noted.
Tazorac Pregnancy And Lactation Text: This medication is not safe during pregnancy. It is unknown if this medication is excreted in breast milk.
Hydroxychloroquine Pregnancy And Lactation Text: This medication has been shown to cause fetal harm but it isn't assigned a Pregnancy Risk Category. There are small amounts excreted in breast milk.
Birth Control Pills Pregnancy And Lactation Text: This medication should be avoided if pregnant and for the first 30 days post-partum.
Ivermectin Pregnancy And Lactation Text: This medication is Pregnancy Category C and it isn't known if it is safe during pregnancy. It is also excreted in breast milk.
Minocycline Counseling: Patient advised regarding possible photosensitivity and discoloration of the teeth, skin, lips, tongue and gums.  Patient instructed to avoid sunlight, if possible.  When exposed to sunlight, patients should wear protective clothing, sunglasses, and sunscreen.  The patient was instructed to call the office immediately if the following severe adverse effects occur:  hearing changes, easy bruising/bleeding, severe headache, or vision changes.  The patient verbalized understanding of the proper use and possible adverse effects of minocycline.  All of the patient's questions and concerns were addressed.
Topical Clindamycin Counseling: Patient counseled that this medication may cause skin irritation or allergic reactions.  In the event of skin irritation, the patient was advised to reduce the amount of the drug applied or use it less frequently.   The patient verbalized understanding of the proper use and possible adverse effects of clindamycin.  All of the patient's questions and concerns were addressed.
Imiquimod Counseling:  I discussed with the patient the risks of imiquimod including but not limited to erythema, scaling, itching, weeping, crusting, and pain.  Patient understands that the inflammatory response to imiquimod is variable from person to person and was educated regarded proper titration schedule.  If flu-like symptoms develop, patient knows to discontinue the medication and contact us.
Use Enhanced Medication Counseling?: No
Stelara Counseling:  I discussed with the patient the risks of ustekinumab including but not limited to immunosuppression, malignancy, posterior leukoencephalopathy syndrome, and serious infections.  The patient understands that monitoring is required including a PPD at baseline and must alert us or the primary physician if symptoms of infection or other concerning signs are noted.
Drysol Counseling:  I discussed with the patient the risks of drysol/aluminum chloride including but not limited to skin rash, itching, irritation, burning.
Birth Control Pills Counseling: Birth Control Pill Counseling: I discussed with the patient the potential side effects of OCPs including but not limited to increased risk of stroke, heart attack, thrombophlebitis, deep venous thrombosis, hepatic adenomas, breast changes, GI upset, headaches, and depression.  The patient verbalized understanding of the proper use and possible adverse effects of OCPs. All of the patient's questions and concerns were addressed.
Topical Retinoid counseling:  Patient advised to apply a pea-sized amount only at bedtime and wait 30 minutes after washing their face before applying.  If too drying, patient may add a non-comedogenic moisturizer. The patient verbalized understanding of the proper use and possible adverse effects of retinoids.  All of the patient's questions and concerns were addressed.
Rifampin Counseling: I discussed with the patient the risks of rifampin including but not limited to liver damage, kidney damage, red-orange body fluids, nausea/vomiting and severe allergy.
High Dose Vitamin A Counseling: Side effects reviewed, pt to contact office should one occur.
Dapsone Counseling: I discussed with the patient the risks of dapsone including but not limited to hemolytic anemia, agranulocytosis, rashes, methemoglobinemia, kidney failure, peripheral neuropathy, headaches, GI upset, and liver toxicity.  Patients who start dapsone require monitoring including baseline LFTs and weekly CBCs for the first month, then every month thereafter.  The patient verbalized understanding of the proper use and possible adverse effects of dapsone.  All of the patient's questions and concerns were addressed.
Bactrim Counseling:  I discussed with the patient the risks of sulfa antibiotics including but not limited to GI upset, allergic reaction, drug rash, diarrhea, dizziness, photosensitivity, and yeast infections.  Rarely, more serious reactions can occur including but not limited to aplastic anemia, agranulocytosis, methemoglobinemia, blood dyscrasias, liver or kidney failure, lung infiltrates or desquamative/blistering drug rashes.
Erythromycin Pregnancy And Lactation Text: This medication is Pregnancy Category B and is considered safe during pregnancy. It is also excreted in breast milk.
Methotrexate Counseling:  Patient counseled regarding adverse effects of methotrexate including but not limited to nausea, vomiting, abnormalities in liver function tests. Patients may develop mouth sores, rash, diarrhea, and abnormalities in blood counts. The patient understands that monitoring is required including LFT's and blood counts.  There is a rare possibility of scarring of the liver and lung problems that can occur when taking methotrexate. Persistent nausea, loss of appetite, pale stools, dark urine, cough, and shortness of breath should be reported immediately. Patient advised to discontinue methotrexate treatment at least three months before attempting to become pregnant.  I discussed the need for folate supplements while taking methotrexate.  These supplements can decrease side effects during methotrexate treatment. The patient verbalized understanding of the proper use and possible adverse effects of methotrexate.  All of the patient's questions and concerns were addressed.
Minoxidil Counseling: Minoxidil is a topical medication which can increase blood flow where it is applied. It is uncertain how this medication increases hair growth. Side effects are uncommon and include stinging and allergic reactions.
Solaraze Pregnancy And Lactation Text: This medication is Pregnancy Category B and is considered safe. There is some data to suggest avoiding during the third trimester. It is unknown if this medication is excreted in breast milk.
Eucrisa Counseling: Patient may experience a mild burning sensation during topical application. Eucrisa is not approved in children less than 2 years of age.
Protopic Counseling: Patient may experience a mild burning sensation during topical application. Protopic is not approved in children less than 2 years of age. There have been case reports of hematologic and skin malignancies in patients using topical calcineurin inhibitors although causality is questionable.
Topical Sulfur Applications Counseling: Topical Sulfur Counseling: Patient counseled that this medication may cause skin irritation or allergic reactions.  In the event of skin irritation, the patient was advised to reduce the amount of the drug applied or use it less frequently.   The patient verbalized understanding of the proper use and possible adverse effects of topical sulfur application.  All of the patient's questions and concerns were addressed.
Quinolones Counseling:  I discussed with the patient the risks of fluoroquinolones including but not limited to GI upset, allergic reaction, drug rash, diarrhea, dizziness, photosensitivity, yeast infections, liver function test abnormalities, tendonitis/tendon rupture.
Spironolactone Pregnancy And Lactation Text: This medication can cause feminization of the male fetus and should be avoided during pregnancy. The active metabolite is also found in breast milk.
Solaraze Counseling:  I discussed with the patient the risks of Solaraze including but not limited to erythema, scaling, itching, weeping, crusting, and pain.
Odomzo Counseling- I discussed with the patient the risks of Odomzo including but not limited to nausea, vomiting, diarrhea, constipation, weight loss, changes in the sense of taste, decreased appetite, muscle spasms, and hair loss.  The patient verbalized understanding of the proper use and possible adverse effects of Odomzo.  All of the patient's questions and concerns were addressed.

## 2018-12-07 NOTE — HPI: ITCHING
How Did Your Itching Occur?: sudden in onset (over a period of weeks to a few months)
How Severe Is Your Itching?: severe
Additional History: Patient here for final patch test reading.

## 2018-12-12 ENCOUNTER — RX ONLY (OUTPATIENT)
Age: 83
Setting detail: RX ONLY
End: 2018-12-12

## 2018-12-12 RX ORDER — GABAPENTIN 100 MG/1
1 CAPSULE ORAL TID
Qty: 30 | Refills: 2 | Status: ERX

## 2019-01-04 ENCOUNTER — APPOINTMENT (RX ONLY)
Dept: URBAN - METROPOLITAN AREA CLINIC 37 | Facility: CLINIC | Age: 84
Setting detail: DERMATOLOGY
End: 2019-01-04

## 2019-01-04 ENCOUNTER — APPOINTMENT (RX ONLY)
Dept: URBAN - METROPOLITAN AREA CLINIC 38 | Facility: CLINIC | Age: 84
Setting detail: DERMATOLOGY
End: 2019-01-04

## 2019-01-04 ENCOUNTER — RX ONLY (OUTPATIENT)
Age: 84
Setting detail: RX ONLY
End: 2019-01-04

## 2019-01-04 DIAGNOSIS — L29.89 OTHER PRURITUS: ICD-10-CM

## 2019-01-04 DIAGNOSIS — L29.8 OTHER PRURITUS: ICD-10-CM

## 2019-01-04 PROCEDURE — ? PRESCRIPTION

## 2019-01-04 PROCEDURE — 99212 OFFICE O/P EST SF 10 MIN: CPT

## 2019-01-04 PROCEDURE — ? COUNSELING

## 2019-01-04 PROCEDURE — ? ADDITIONAL NOTES

## 2019-01-04 RX ORDER — TRIAMCINOLONE ACETONIDE 1 MG/G
THIN LAYER CREAM TOPICAL BID
Qty: 1 | Refills: 1 | Status: ERX

## 2019-01-04 RX ORDER — HYDROCORTISONE 25 MG/G
1 CREAM TOPICAL BID
Qty: 1 | Refills: 1 | Status: ERX

## 2019-01-04 ASSESSMENT — LOCATION DETAILED DESCRIPTION DERM
LOCATION DETAILED: LEFT MEDIAL FOREHEAD
LOCATION DETAILED: INFERIOR THORACIC SPINE
LOCATION DETAILED: INFERIOR THORACIC SPINE
LOCATION DETAILED: LEFT MEDIAL FOREHEAD

## 2019-01-04 ASSESSMENT — LOCATION ZONE DERM
LOCATION ZONE: TRUNK
LOCATION ZONE: TRUNK
LOCATION ZONE: FACE
LOCATION ZONE: FACE

## 2019-01-04 ASSESSMENT — LOCATION SIMPLE DESCRIPTION DERM
LOCATION SIMPLE: LEFT FOREHEAD
LOCATION SIMPLE: UPPER BACK
LOCATION SIMPLE: UPPER BACK
LOCATION SIMPLE: LEFT FOREHEAD

## 2019-01-04 NOTE — PROCEDURE: ADDITIONAL NOTES
Detail Level: Simple
Additional Notes: Discussed her itching and the use of topical steroids at length. She can continue using Triamcinolone on her back and we will prescribe Hydrocortisone for her forehead. Refills sent in for Triamcinolone electronically. Follow up as needed.

## 2019-01-04 NOTE — PROCEDURE: ADDITIONAL NOTES
Additional Notes: Discussed her itching and the use of topical steroids at length. She can continue using Triamcinolone on her back and we will prescribe Hydrocortisone for her forehead. Refills sent in for Triamcinolone electronically. Follow up as needed.
Detail Level: Simple

## 2019-01-04 NOTE — PROCEDURE: MIPS QUALITY
Quality 111:Pneumonia Vaccination Status For Older Adults: Pneumococcal Vaccination Previously Received
Detail Level: Detailed
Quality 47: Advance Care Plan: Advance Care Planning discussed and documented; advance care plan or surrogate decision maker documented in the medical record.
Quality 226: Preventive Care And Screening: Tobacco Use: Screening And Cessation Intervention: Patient screened for tobacco use and is an ex/non-smoker
Quality 431: Preventive Care And Screening: Unhealthy Alcohol Use - Screening: Patient screened for unhealthy alcohol use using a single question and scores less than 2 times per year
Quality 110: Preventive Care And Screening: Influenza Immunization: Influenza Immunization Administered during Influenza season
Quality 130: Documentation Of Current Medications In The Medical Record: Current Medications Documented
Name And Contact Information For Health Care Proxy: Leonardo Ahn [phone]

## 2019-01-04 NOTE — PROCEDURE: MIPS QUALITY
Quality 130: Documentation Of Current Medications In The Medical Record: Current Medications Documented
Quality 431: Preventive Care And Screening: Unhealthy Alcohol Use - Screening: Patient screened for unhealthy alcohol use using a single question and scores less than 2 times per year
Quality 111:Pneumonia Vaccination Status For Older Adults: Pneumococcal Vaccination Previously Received
Name And Contact Information For Health Care Proxy: Artur Bradford [phone]
Detail Level: Detailed
Quality 47: Advance Care Plan: Advance Care Planning discussed and documented; advance care plan or surrogate decision maker documented in the medical record.
Quality 110: Preventive Care And Screening: Influenza Immunization: Influenza Immunization Administered during Influenza season
Quality 226: Preventive Care And Screening: Tobacco Use: Screening And Cessation Intervention: Patient screened for tobacco use and is an ex/non-smoker

## 2019-01-04 NOTE — HPI: RASH
What Type Of Note Output Would You Prefer (Optional)?: Bullet Format
How Severe Is Your Rash?: mild
Is This A New Presentation, Or A Follow-Up?: Rash
Additional History: Pt states she is here for her rash that is mostly present on her back and forehead. This rash started in October. She states she had allergy testing and labs drawn last month. She is using the topical steroid which does offer relief. She is wanting a definitive answer as to what this is and how to treat this.

## 2019-02-15 ENCOUNTER — APPOINTMENT (RX ONLY)
Dept: URBAN - METROPOLITAN AREA CLINIC 44 | Facility: CLINIC | Age: 84
Setting detail: DERMATOLOGY
End: 2019-02-15

## 2019-02-15 ENCOUNTER — APPOINTMENT (RX ONLY)
Dept: URBAN - METROPOLITAN AREA CLINIC 45 | Facility: CLINIC | Age: 84
Setting detail: DERMATOLOGY
End: 2019-02-15

## 2019-02-15 DIAGNOSIS — L85.3 XEROSIS CUTIS: ICD-10-CM

## 2019-02-15 DIAGNOSIS — L29.89 OTHER PRURITUS: ICD-10-CM

## 2019-02-15 DIAGNOSIS — L82.1 OTHER SEBORRHEIC KERATOSIS: ICD-10-CM

## 2019-02-15 DIAGNOSIS — L29.8 OTHER PRURITUS: ICD-10-CM

## 2019-02-15 PROCEDURE — ? INJECTION

## 2019-02-15 PROCEDURE — ? COUNSELING

## 2019-02-15 PROCEDURE — ? RECOMMENDATIONS

## 2019-02-15 PROCEDURE — 11900 INJECT SKIN LESIONS </W 7: CPT

## 2019-02-15 PROCEDURE — 99214 OFFICE O/P EST MOD 30 MIN: CPT | Mod: 25

## 2019-02-15 PROCEDURE — ? ADDITIONAL NOTES

## 2019-02-15 ASSESSMENT — LOCATION SIMPLE DESCRIPTION DERM
LOCATION SIMPLE: LEFT UPPER BACK
LOCATION SIMPLE: RIGHT BUTTOCK
LOCATION SIMPLE: RIGHT BUTTOCK
LOCATION SIMPLE: LEFT UPPER BACK

## 2019-02-15 ASSESSMENT — LOCATION ZONE DERM
LOCATION ZONE: TRUNK

## 2019-02-15 ASSESSMENT — ITCH INTENSITY
HOW SEVERE IS YOUR ITCHING?: 10
HOW SEVERE IS YOUR ITCHING?: 10

## 2019-02-15 ASSESSMENT — LOCATION DETAILED DESCRIPTION DERM
LOCATION DETAILED: LEFT SUPERIOR MEDIAL UPPER BACK
LOCATION DETAILED: LEFT SUPERIOR MEDIAL UPPER BACK
LOCATION DETAILED: RIGHT BUTTOCK
LOCATION DETAILED: RIGHT BUTTOCK

## 2019-02-15 NOTE — PROCEDURE: MIPS QUALITY
Detail Level: Detailed
Quality 111:Pneumonia Vaccination Status For Older Adults: Pneumococcal Vaccination Previously Received
Quality 226: Preventive Care And Screening: Tobacco Use: Screening And Cessation Intervention: Patient screened for tobacco use and is an ex/non-smoker
Name And Contact Information For Health Care Proxy: (Daughter)Mae Ahn [phone]
Quality 47: Advance Care Plan: Advance Care Planning discussed and documented; advance care plan or surrogate decision maker documented in the medical record.
Quality 130: Documentation Of Current Medications In The Medical Record: Current Medications Documented
Quality 110: Preventive Care And Screening: Influenza Immunization: Influenza Immunization previously received during influenza season
Quality 431: Preventive Care And Screening: Unhealthy Alcohol Use - Screening: Patient screened for unhealthy alcohol use using a single question and scores less than 2 times per year

## 2019-02-15 NOTE — PROCEDURE: RECOMMENDATIONS
Detail Level: Zone
Recommendations (Free Text): We discussed next step processes with this patient’s condition such as a Steroid injection as well as a possible CT scan to rule out any cancers.
Recommendation Preamble: The following recommendations were made during the visit:

## 2019-02-15 NOTE — HPI: PRURITUS
How Did Your Itching Occur?: sudden in onset (over a period of weeks to a few months)
How Severe Is Your Itching?: mild
Additional History: Patient was seen in October with a diagnosis of Pruritis to her back where she was given a list of washes and things to use and stated they were not effective. Patient states it has gotten worse since then and has now spread to her whole back and forehead. Patient states she was just diagnosed with a yeast infection to the esophagus and took the prescribed antibiotic Fluconazole 100mg  last night before going to bed. She states when she woke up this morning the itching was generalized to the whole body.

## 2019-02-15 NOTE — PROCEDURE: INJECTION
Lot # (Optional): [id number]
Administered By (Optional): LANIE
Dose Administered (Numbers Only - Mg, G, Mcg, Units, Cc): 40
Treatment Number: 0
Medication (1) And Associated J-Code Units: Depomedrol, 40mg
Bill J-Code: yes
Lot # (Optional): [id number]
Medication (2) And Associated J-Code Units: Triamcinolone acetonide, 10mg
Procedure Information: Please note that the numeric value listed in the Medication (1) and associated J-code units and Medication (2) and associated J-code units variables are j-code amounts and do not represent either the concentration or the total amount of the medications injected.  I strongly recommend selecting no to the Render J-code information in note question. This will allow your note to be more clear. If you are billing j-codes with your injection codes you need to document the total amount of the medication injected. This amount should match the j-code units. For example, if you are injecting Triamcinolone 40mg as an intramuscular injection you would select 40 for the dose field and mg for the units. This would allow you to document  with 4 units (40mg = 10mg x 4). The total volume is not used to calculate j-codes only the amount of the medication administered.
Detail Level: None
Post-Care Instructions: I reviewed with the patient in detail post-care instructions. Patient understands to keep the injection sites clean and call the clinic if there is any redness, swelling or pain.
Consent: The risks of the medication were reviewed with the patient.
Expiration Date (Optional): 01/2020
Ndc #: [address]-35
Expiration Date (Optional): Feb 2020
Ndc #: [address]-83
Total Volume Injected In Cc (Will Not Affected Billing): 2
Route: [place]

## 2019-02-15 NOTE — PROCEDURE: RECOMMENDATIONS
Recommendations (Free Text): We discussed next step processes with this patient’s condition such as a Steroid injection as well as a possible CT scan to rule out any cancers.
Detail Level: Zone
Recommendation Preamble: The following recommendations were made during the visit:

## 2019-02-15 NOTE — PROCEDURE: MIPS QUALITY
Quality 110: Preventive Care And Screening: Influenza Immunization: Influenza Immunization previously received during influenza season
Quality 226: Preventive Care And Screening: Tobacco Use: Screening And Cessation Intervention: Patient screened for tobacco use and is an ex/non-smoker
Quality 111:Pneumonia Vaccination Status For Older Adults: Pneumococcal Vaccination Previously Received
Quality 431: Preventive Care And Screening: Unhealthy Alcohol Use - Screening: Patient screened for unhealthy alcohol use using a single question and scores less than 2 times per year
Quality 47: Advance Care Plan: Advance Care Planning discussed and documented; advance care plan or surrogate decision maker documented in the medical record.
Name And Contact Information For Health Care Proxy: (Daughter)Marcy Bradford [phone]
Detail Level: Detailed
Quality 130: Documentation Of Current Medications In The Medical Record: Current Medications Documented

## 2019-02-15 NOTE — PROCEDURE: INJECTION
Render J-Code Information In Note?: yes
Ndc #: [address]-14
Treatment Number: 0
Lot # (Optional): [id number]
Procedure Information: Please note that the numeric value listed in the Medication (1) and associated J-code units and Medication (2) and associated J-code units variables are j-code amounts and do not represent either the concentration or the total amount of the medications injected.  I strongly recommend selecting no to the Render J-code information in note question. This will allow your note to be more clear. If you are billing j-codes with your injection codes you need to document the total amount of the medication injected. This amount should match the j-code units. For example, if you are injecting Triamcinolone 40mg as an intramuscular injection you would select 40 for the dose field and mg for the units. This would allow you to document  with 4 units (40mg = 10mg x 4). The total volume is not used to calculate j-codes only the amount of the medication administered.
Ndc #: [address]-35
Route: IL
Lot # (Optional): [id number]
Dose Administered (Numbers Only - Mg, G, Mcg, Units, Cc): 40
Consent: The risks of the medication were reviewed with the patient.
Total Volume Injected In Cc (Will Not Affected Billing): 2
Medication (1) And Associated J-Code Units: Depomedrol, 40mg
Medication (2) And Associated J-Code Units: Triamcinolone acetonide, 10mg
Detail Level: None
Expiration Date (Optional): 01/2020
Expiration Date (Optional): Feb 2020
Administered By (Optional): ANGEL
Post-Care Instructions: I reviewed with the patient in detail post-care instructions. Patient understands to keep the injection sites clean and call the clinic if there is any redness, swelling or pain.

## 2019-02-15 NOTE — PROCEDURE: ADDITIONAL NOTES
Additional Notes: This is pruritus sans rash, only xerosis. This is seriously bothering the patient.  10/10 itch.  She jokingly said she even thought about killing herself, laughing, but still this is significant itch.  \\n\\nDDx is drug-induced pruritus vs pruritus of systemic disease (malignancy, endocrine, hematologic, etc) vs systemic contact dermatitis (patch testing was negative, though) vs idiopathic.  Incipient bullous pemphigoid may present with pruritus and no eruption.\\n\\nThe TRUE test was (-). \\n\\nLabs were unremarkable:\\n(-) Vit D\\n(-) CBC\\n(-) CMP except low eGFR\\n(-) TSH\\n\\nThere is nothing to biopsy. \\n\\nPatient very frustrated there are no answers.  Wants to know \"why.\"\\n\\Trace this point, I think we have 3 options: \\n1) D/C medications one at a time for 6 weeks at a time to see if they are cause of pruritus, starting with HCTZ. \\n2) Continue workup with labs and imaging.  Could consider pan CT to r/o internal malignancy.  Patient says she recently had upper endoscopy and had colonoscopy few years ago which was normal.  Her ROS is (-) aside from this itching; she denies any weight loss/gain, F/C/NS, swollen LNs.  Could consider anti-BMZ antibodies to r/o incipient bullous pemphigoid.  Could check hepatitis panel and QGOLD in anticipation of starting an immunosuppressant.\\n3) Treat empirically with a systemic medication. I would recommend starting with IM corticosteroids to see if there is substantial improvement from that.  Other options including NALTREXONE, low-dose MTX, or DUPIXENT, among others.\\n\\nPatient agreeable to IM corticosteroids today.  Agrees to AEs which were discussed, including osteonecrosis.\\n\\nRTC 2 weeks for further discussion and management.
Detail Level: Simple
Additional Notes: Patient says she is using all gentle skin care products and following my SKIN DIET.

## 2019-02-15 NOTE — PROCEDURE: ADDITIONAL NOTES
Additional Notes: Patient says she is using all gentle skin care products and following my SKIN DIET.
Detail Level: Simple
Additional Notes: This is pruritus sans rash, only xerosis. This is seriously bothering the patient.  10/10 itch.  She jokingly said she even thought about killing herself, laughing, but still this is significant itch.  \\n\\nDDx is drug-induced pruritus vs pruritus of systemic disease (malignancy, endocrine, hematologic, etc) vs systemic contact dermatitis (patch testing was negative, though) vs idiopathic.  Incipient bullous pemphigoid may present with pruritus and no eruption.\\n\\nThe TRUE test was (-). \\n\\nLabs were unremarkable:\\n(-) Vit D\\n(-) CBC\\n(-) CMP except low eGFR\\n(-) TSH\\n\\nThere is nothing to biopsy. \\n\\nPatient very frustrated there are no answers.  Wants to know \"why.\"\\n\\Annamarie this point, I think we have 3 options: \\n1) D/C medications one at a time for 6 weeks at a time to see if they are cause of pruritus, starting with HCTZ. \\n2) Continue workup with labs and imaging.  Could consider pan CT to r/o internal malignancy.  Patient says she recently had upper endoscopy and had colonoscopy few years ago which was normal.  Her ROS is (-) aside from this itching; she denies any weight loss/gain, F/C/NS, swollen LNs.  Could consider anti-BMZ antibodies to r/o incipient bullous pemphigoid.  Could check hepatitis panel and QGOLD in anticipation of starting an immunosuppressant.\\n3) Treat empirically with a systemic medication. I would recommend starting with IM corticosteroids to see if there is substantial improvement from that.  Other options including NALTREXONE, low-dose MTX, or DUPIXENT, among others.\\n\\nPatient agreeable to IM corticosteroids today.  Agrees to AEs which were discussed, including osteonecrosis.\\n\\nRTC 2 weeks for further discussion and management.

## 2019-06-10 ENCOUNTER — RX ONLY (OUTPATIENT)
Age: 84
Setting detail: RX ONLY
End: 2019-06-10

## 2019-06-10 RX ORDER — TRIAMCINOLONE ACETONIDE 1 MG/G
1 CREAM TOPICAL BID
Qty: 1 | Refills: 0 | Status: ERX

## 2020-11-02 ENCOUNTER — OFFICE VISIT (OUTPATIENT)
Dept: NEUROLOGY | Age: 85
End: 2020-11-02
Payer: MEDICARE

## 2020-11-02 VITALS
BODY MASS INDEX: 27.48 KG/M2 | WEIGHT: 140 LBS | RESPIRATION RATE: 18 BRPM | SYSTOLIC BLOOD PRESSURE: 128 MMHG | TEMPERATURE: 98.1 F | HEIGHT: 60 IN | DIASTOLIC BLOOD PRESSURE: 80 MMHG | OXYGEN SATURATION: 97 % | HEART RATE: 71 BPM

## 2020-11-02 DIAGNOSIS — R41.3 MEMORY LOSS: Primary | ICD-10-CM

## 2020-11-02 PROCEDURE — 99204 OFFICE O/P NEW MOD 45 MIN: CPT | Performed by: PSYCHIATRY & NEUROLOGY

## 2020-11-02 PROCEDURE — G8510 SCR DEP NEG, NO PLAN REQD: HCPCS | Performed by: PSYCHIATRY & NEUROLOGY

## 2020-11-02 PROCEDURE — G8419 CALC BMI OUT NRM PARAM NOF/U: HCPCS | Performed by: PSYCHIATRY & NEUROLOGY

## 2020-11-02 PROCEDURE — G8427 DOCREV CUR MEDS BY ELIG CLIN: HCPCS | Performed by: PSYCHIATRY & NEUROLOGY

## 2020-11-02 PROCEDURE — 1090F PRES/ABSN URINE INCON ASSESS: CPT | Performed by: PSYCHIATRY & NEUROLOGY

## 2020-11-02 PROCEDURE — G8400 PT W/DXA NO RESULTS DOC: HCPCS | Performed by: PSYCHIATRY & NEUROLOGY

## 2020-11-02 PROCEDURE — 1101F PT FALLS ASSESS-DOCD LE1/YR: CPT | Performed by: PSYCHIATRY & NEUROLOGY

## 2020-11-02 PROCEDURE — G8536 NO DOC ELDER MAL SCRN: HCPCS | Performed by: PSYCHIATRY & NEUROLOGY

## 2020-11-02 RX ORDER — LOSARTAN POTASSIUM AND HYDROCHLOROTHIAZIDE 25; 100 MG/1; MG/1
1 TABLET ORAL DAILY
COMMUNITY

## 2020-11-02 RX ORDER — PSEUDOEPH/DM/GUAIFEN/ACETAMIN 30-10-324
EXPECTORANT ORAL
COMMUNITY

## 2020-11-02 RX ORDER — METOPROLOL SUCCINATE 50 MG/1
TABLET, EXTENDED RELEASE ORAL DAILY
COMMUNITY

## 2020-11-02 RX ORDER — VITAMIN E 268 MG
CAPSULE ORAL DAILY
COMMUNITY

## 2020-11-02 RX ORDER — CALCIUM CARBONATE/VITAMIN D3 600 MG-125
TABLET ORAL
COMMUNITY
End: 2022-10-13

## 2020-11-02 RX ORDER — VENLAFAXINE HYDROCHLORIDE 75 MG/1
CAPSULE, EXTENDED RELEASE ORAL DAILY
COMMUNITY
End: 2022-10-13 | Stop reason: ALTCHOICE

## 2020-11-02 RX ORDER — GUAIFENESIN 100 MG/5ML
81 LIQUID (ML) ORAL DAILY
COMMUNITY

## 2020-11-02 RX ORDER — GLUCOSAMINE/CHONDR SU A SOD 750-600 MG
TABLET ORAL
COMMUNITY
End: 2022-10-13

## 2020-11-02 RX ORDER — SUCRALFATE 1 G/1
1 TABLET ORAL 4 TIMES DAILY
COMMUNITY
End: 2022-10-13

## 2020-11-02 RX ORDER — HYDRALAZINE HYDROCHLORIDE 25 MG/1
25 TABLET, FILM COATED ORAL 3 TIMES DAILY
COMMUNITY

## 2020-11-02 RX ORDER — SIMVASTATIN 40 MG/1
TABLET, FILM COATED ORAL
COMMUNITY
End: 2022-10-13

## 2020-11-02 RX ORDER — OMEPRAZOLE 40 MG/1
40 CAPSULE, DELAYED RELEASE ORAL DAILY
COMMUNITY

## 2020-11-02 NOTE — PROGRESS NOTES
[facility]rology Clinics and  [address] at [facility] Neurology Clinics at [facility], [address] [facility] [address]  [phone] Office  [phone] Facsimile           Referring: Isela Alfredo MD      Chief Complaint   Patient presents with    Altered mental status     STM decr over the past 6-12 mo.  [place] to [place] from [address] [place] [address]. 31-year-old right-handed lady who comes today for evaluation of what she calls check mental acuity. She tells me over the last 6 months or so she has noticed more difficulty with her short-term memory. Her long-term memory is good. She says now sometimes she is in a conversation with someone and they will ask her what she had for dinner etc. and she cannot remember. Sometimes people tell her that she has told the same story or asked the same question. Her girlfriend who still lives in [place] and with whom she speaks regularly will sometimes tell her that she is repeating herself. Her son who lives in [place], has not made any mention to her of any difficulty. She saw Dr. Sunny Arora for evaluation and the patient tells me that it was felt she should come for evaluation. No dangerous behaviors. No difficulty with paying bills or remembering medicines. She uses a pillbox. She has just moved here from [facility] recently to be closer to her son who lives in [facility]. No changes in medicine. No illness. No family history of dementia. Her mother and brother  of polycystic kidney disease. No dementia in her father. No chest pain or palpitations. No abdominal pain. She has some anxiety. Some constipation. She has no headache. No numbness or tingling. No focal complaints. No visual change. No loss or alteration in consciousness. She has not had any imaging. No formal memory test although it sounds like a screening test was done as noted. She has had some blood work although she is unsure as to the specific tests that were done. Review of the electronic medical record finds no documentation in our system. Likewise no imaging in our system. Past Medical History:   Diagnosis Date    Anxiety disorder     Constipation     Essential hypertension     Memory disorder     short term       Past Surgical History:   Procedure Laterality Date    HX APPENDECTOMY      HX TONSILLECTOMY       Current Outpatient Medications   Medication Sig Dispense Refill    losartan-hydroCHLOROthiazide (HYZAAR) 100-25 mg per tablet Take 1 Tab by mouth daily.  metoprolol succinate (TOPROL-XL) 50 mg XL tablet Take  by mouth daily.  simvastatin (ZOCOR) 40 mg tablet Take  by mouth nightly.  hydrALAZINE (APRESOLINE) 25 mg tablet Take 25 mg by mouth three (3) times daily.  omeprazole (PRILOSEC) 40 mg capsule Take 40 mg by mouth daily.  sucralfate (CARAFATE) 1 gram tablet Take 1 g by mouth four (4) times daily.  venlafaxine-SR (Effexor XR) 75 mg capsule Take  by mouth daily.  multivit-min/iron/folic/lutein (CENTRUM SILVER WOMEN PO) Take  by mouth.  vitamin E (AQUA GEMS) 400 unit capsule Take  by mouth daily.  aspirin 81 mg chewable tablet Take 81 mg by mouth daily.  calcium-cholecalciferol, d3, (CALCIUM 600 + D) 600-125 mg-unit tab Take  by mouth.  Biotin 2,500 mcg cap Take  by mouth.  lutein 20 mg cap Take  by mouth.  fish oil/borage/flax/om3,6,9 1 (TRIPLE OMEGA 3-6-9 PO) Take 2 Caps by mouth two (2) times a day.        Allergies   Allergen Reactions    Sulfa (Sulfonamide Antibiotics) Unknown (comments)     Social History     Tobacco Use    Smoking status: Never Smoker    Smokeless tobacco: Never Used   Substance Use Topics    Alcohol use: Yes     Comment: occasional glass of wine    Drug use: Not on file       Family History   Problem Relation Age of Onset    Kidney Disease Mother         polycystic kidney dz    Kidney Disease Brother         polycystic kidney dz    Kidney Disease Maternal Uncle         polycystic kidney dz     Review of Systems  Pertinent positives and negatives as noted with remainder of comprehensive review negative    Examination  Visit Vitals  /80 (BP 1 Location: Left arm, BP Patient Position: Sitting)   Pulse 71   Temp 98.1 °F (36.7 °C) (Oral)   Resp 18   Ht 5' (1.524 m)   Wt 63.5 kg (140 lb)   SpO2 97%   BMI 27.34 kg/m²     Pleasant, well appearing. Dress and grooming are appropriate. No scleral icterus is present. Oropharynx is clear. Supple neck without bruit appreciated. Heart regular. Pulses are symmetrical.  No edema in the lower extremities. Neurologically she is awake alert and oriented. Normal speech and language. Discusses medical history. She has registration 3/3 recall 2/3 at 5 minutes and when I give her clues 3/3. She spells the word world forward and backwards. She navigates a cryptic phrase. No right left confusion. Follows all commands. Cranial nerves intact 2-12. No nystagmus. No motor focality with no pronation or drift and full strength in the upper and lower extremities in all muscle groups today testing. Reflexes symmetrical in the upper and lower extremities bilaterally. No pathologic reflexes with downgoing toes and no Maribel. She has no ataxia. Sensory intact to primary. Gait steady. Impression/Plan  79-year-old with complaints of short-term cognitive difficulty/memory loss and differential diagnosis certainly includes age-related memory decline versus mild cognitive impairment versus early dementing process versus structural versus vascular versus metabolic versus attention versus processing versus other. Proceed with CT scan of the head without contrast to evaluate for anatomic abnormality. EEG to evaluate baseline rhythms and look for asymmetries and slow background. Carotid Doppler for carotid stenosis.   Formal neuropsychological evaluation with Dr. Chaz Lauren. We will get the blood work that was done at Dr. Hickman Goes office to ensure that she has had B12 and thyroid function checked. Follow-up with me after her testing    Tyree Joseph MD    This note was created using voice recognition software. Despite editing, there may be syntax errors.

## 2020-11-02 NOTE — PATIENT INSTRUCTIONS
RESULT POLICY      If we have ordered testing for you, know that; \"NO NEWS IS GOOD NEWS! \"     It is our policy that we no longer call patients with results, nor do we  give test results over the phone. We schedule follow up appointments so that your results can be discussed in person. This allows you to address any questions you have regarding the results. If you choose to go to an imaging center outside of [facility], it is your responsibility to bring imaging report and disc to follow up appointment. If something of concern is revealed on your test, we will contact you to discuss the matter and if needed schedule a sooner follow up appointment. Additionally, results may be found by using the My Chart feature and one of our patient service representatives at the  can give you instructions on how to access this feature to utilize our electronic medical record system. Thank you for your understanding. [address] [facility]   Statement to Patients  April 1, 2014      In an effort to ensure the large volume of patient prescription refills is processed in the most efficient and expeditious manner, we are asking our patients to assist us by calling your Pharmacy for all prescription refills, this will include also your  Mail Order Pharmacy. The pharmacy will contact our office electronically to continue the refill process. Please do not wait until the last minute to call your pharmacy. We need at least 48 hours (2days) to fill prescriptions. We also encourage you to call your pharmacy before going to  your prescription to make sure it is ready. With regard to controlled substance prescription refill requests (narcotic refills) that need to be picked up at our office, we ask your cooperation by providing us with at least 72 hours (3days) notice that you will need a refill.     We will not refill narcotic prescription refill requests after 4:00pm on any weekday, Monday through Thursday, or after 2:00pm on Fridays, or on the weekends. We encourage everyone to explore another way of getting your prescription refill request processed using ONEPLE, our patient web portal through our electronic medical record system. ONEPLE is an efficient and effective way to communicate your medication request directly to the office and  downloadable as an alfie on your smart phone . ONEPLE also features a review functionality that allows you to view your medication list as well as leave messages for your physician. Are you ready to get connected? If so please review the attatched instructions or speak to any of our staff to get you set up right away! Thank you so much for your cooperation. Should you have any questions please contact our Practice Administrator.

## 2020-11-02 NOTE — LETTER
11/2/20 Patient: Quan Mcnulty YOB: 1935 Date of Visit: 11/2/2020 Meghan Castro MD 
[address] [facility] [address] [place] [id number] VIA Facsimile: [phone] Dear Meghan Castro MD, Thank you for referring Ms. Quan Mcnulty to [facility] for evaluation. My notes for this consultation are attached. If you have questions, please do not hesitate to call me. I look forward to following your patient along with you. Sincerely, Yves Stuart MD

## 2020-11-03 ENCOUNTER — ANCILLARY PROCEDURE (OUTPATIENT)
Dept: NEUROLOGY | Age: 85
End: 2020-11-03

## 2020-11-03 ENCOUNTER — OFFICE VISIT (OUTPATIENT)
Dept: NEUROLOGY | Age: 85
End: 2020-11-03

## 2020-11-03 VITALS — TEMPERATURE: 96.8 F

## 2020-11-03 DIAGNOSIS — R41.3 MEMORY LOSS: ICD-10-CM

## 2020-11-03 DIAGNOSIS — R41.0 CONFUSION: Primary | ICD-10-CM

## 2020-11-03 NOTE — PROCEDURES
EEG:      Date:  11/03/20    Requesting Physician:  Bella Emery. MD Damaris    An EEG is requested in this 82year-old with confusion to evaluate for epileptiform abnormality. Medications:  Medications are listed as Effexor, Zocor, Toprol, Hyzaar. This tracing is obtained during the awake state. During wakefulness the background consists of diffuse, low voltage, fast frequency, beta wave activity. Hyperventilation is not performed secondary to COVID-19 precautions. Intermittent photic stimulation little alters the tracing. Sleep is not attained. Interpretation:   This EEG recorded during the awake state is normal.

## 2020-11-04 ENCOUNTER — DOCUMENTATION ONLY (OUTPATIENT)
Dept: NEUROLOGY | Age: 85
End: 2020-11-04

## 2020-11-04 NOTE — PROGRESS NOTES
[facility] AT [place]   Carotid Doppler Report    Date of Service: 11/3/2020  Referring: Dr. Rhys Pa      B-mode imaging reveals mild intimal thickening in the proximal internal carotid artery segments bilaterally. Doppler spectral analysis reveals no elevated velocity shifts. Vertebral artery flow antegrade bilaterally.     Interpretation  1-15% bilateral ICA    Zoey Medrano MD

## 2020-11-19 ENCOUNTER — HOSPITAL ENCOUNTER (OUTPATIENT)
Dept: CT IMAGING | Age: 85
Discharge: HOME | End: 2020-11-19
Attending: PSYCHIATRY & NEUROLOGY
Payer: MEDICARE

## 2020-11-19 DIAGNOSIS — R41.3 MEMORY LOSS: ICD-10-CM

## 2020-11-19 PROCEDURE — 70450 CT HEAD/BRAIN W/O DYE: CPT

## 2020-11-20 ENCOUNTER — TELEPHONE (OUTPATIENT)
Dept: NEUROLOGY | Age: 85
End: 2020-11-20

## 2021-01-14 ENCOUNTER — OFFICE VISIT (OUTPATIENT)
Dept: NEUROLOGY | Age: 86
End: 2021-01-14
Payer: MEDICARE

## 2021-01-14 DIAGNOSIS — R41.3 MEMORY LOSS: ICD-10-CM

## 2021-01-14 DIAGNOSIS — R41.89 COGNITIVE DECLINE: ICD-10-CM

## 2021-01-14 DIAGNOSIS — R41.840 ATTENTION DEFICIT: ICD-10-CM

## 2021-01-14 DIAGNOSIS — G31.84 MILD COGNITIVE IMPAIRMENT: Primary | ICD-10-CM

## 2021-01-14 DIAGNOSIS — Z86.59 HISTORY OF ANXIETY: ICD-10-CM

## 2021-01-14 DIAGNOSIS — R47.89 WORD FINDING DIFFICULTY: ICD-10-CM

## 2021-01-14 DIAGNOSIS — F41.8 ANXIETY ABOUT HEALTH: ICD-10-CM

## 2021-01-14 PROCEDURE — 90791 PSYCH DIAGNOSTIC EVALUATION: CPT | Performed by: CLINICAL NEUROPSYCHOLOGIST

## 2021-01-14 NOTE — PROGRESS NOTES
[address] [facility],[address]Th Floor  [address]   [place] [address] [place] [facility]   [address]   [phone] Office   [phone] Fax      Neuropsychology    Initial Diagnostic Interview Note      Referral:  Osmani Montes MD, . Devante Shen is a 82 y.o. right handed   female who was unaccompanied to the initial clinical interview on 1/14/21 . Please refer to her medical records for details pertaining to her history. At the start of the appointment, I reviewed the patient's [facility] Epic Chart (including Media scanned in from previous providers) for the active Problem List, all pertinent Past Medical Hx, medications, recent radiologic and laboratory findings. In addition, I reviewed pt's documented Immunization Record and Encounter History. She completed college without history of previously diagnosed LD and/or receipt of special education services. Moved here from [facility] and son is here nearby. The patient has noticed a progressive decline in short term memory. Forgets the content of conversations. Misplaces things. Starts tasks and does not complete. Longer to come up with a name. Longer time to come up with a thought. Long term memory is not an issue. She will go into a room and forget why. She will be asked what she had for dinner and forgets. Problems going on about 8 months, slow and progressive. No acute or focal issues. No changes in sense of taste or smell. No known stroke, meningitis/encephalitis, MARILU Fever, Lupus, Lyme, TBI, sz, etc.  Driving is okay, but she has not been in [place] all that long (1 year) so has to use her iphone for directions and such. Her girlfriend who still lives in [place] and with whom she speaks regularly will sometimes tell her that she is repeating herself. Talked to PCP and referred here for evaluation. No dangerous behaviors.   No difficulty with paying bills or remembering medicines. She uses a pillbox. No family history of dementia. Her mother and brother  of polycystic kidney disease. She has some anxiety. No counseling or psychiatrist. Some constipation. She has no headache. No numbness or tingling. No focal complaints. No visual change. No loss or alteration in consciousness. She has not had any imaging. No counseling or psychiatrist.        HEAD CT WITHOUT CONTRAST: 2020 4:13 PM     INDICATION: memory loss     COMPARISON: None.     PROCEDURE: Axial images of the head were obtained without contrast. Coronal and  sagittal reformats were performed. CT dose reduction was achieved through use of  a standardized protocol tailored for this examination and automatic exposure  control for dose modulation. Adaptive statistical iterative reconstruction  (ASIR) was utilized.     FINDINGS: Periventricular white matter hypodensity is nonspecific, but  consistent with mild chronic small vessel ischemic disease. The ventricles and  sulci are appropriate in size and configuration for age. No loss of gray-white  differentiation to suggest late acute or early subacute infarction. No mass  effect or intracranial hemorrhage.     IMPRESSION  IMPRESSION: No acute intracranial abnormality. No previous neuropsych.      Neuropsychological Mental Status Exam (NMSE):      Historian: Good  Praxis: No UE apraxia  R/L Orientation: Intact to self and to other  Dress: within normal limits   Weight: within normal limits   Appearance/Hygiene: within normal limits   Gait: within normal limits   Assistive Devices: None  Mood: within normal limits   Affect: within normal limits   Comprehension: within normal limits   Thought Process: within normal limits   Expressive Language: within normal limits   Receptive Language: within normal limits   Motor:  No cognitive or motor perseveration  ETOH: Once in a while, not a drinker  Tobacco: Denied  Illicit: Denied  SI/HI: Denied  Psychosis: Denied  Insight: Within normal limits  Judgment: Within normal limits  Other Psych:      Past Medical History:   Diagnosis Date    Anxiety disorder     Constipation     Essential hypertension     Memory disorder     short term       Past Surgical History:   Procedure Laterality Date    HX APPENDECTOMY      HX TONSILLECTOMY         Allergies   Allergen Reactions    Sulfa (Sulfonamide Antibiotics) Unknown (comments)       Family History   Problem Relation Age of Onset    Kidney Disease Mother         polycystic kidney dz    Kidney Disease Brother         polycystic kidney dz    Kidney Disease Maternal Uncle         polycystic kidney dz       Social History     Tobacco Use    Smoking status: Never Smoker    Smokeless tobacco: Never Used   Substance Use Topics    Alcohol use: Yes     Comment: occasional glass of wine    Drug use: Not on file       Current Outpatient Medications   Medication Sig Dispense Refill    losartan-hydroCHLOROthiazide (HYZAAR) 100-25 mg per tablet Take 1 Tab by mouth daily.  metoprolol succinate (TOPROL-XL) 50 mg XL tablet Take  by mouth daily.  simvastatin (ZOCOR) 40 mg tablet Take  by mouth nightly.  hydrALAZINE (APRESOLINE) 25 mg tablet Take 25 mg by mouth three (3) times daily.  omeprazole (PRILOSEC) 40 mg capsule Take 40 mg by mouth daily.  sucralfate (CARAFATE) 1 gram tablet Take 1 g by mouth four (4) times daily.  venlafaxine-SR (Effexor XR) 75 mg capsule Take  by mouth daily.  multivit-min/iron/folic/lutein (CENTRUM SILVER WOMEN PO) Take  by mouth.  vitamin E (AQUA GEMS) 400 unit capsule Take  by mouth daily.  aspirin 81 mg chewable tablet Take 81 mg by mouth daily.  calcium-cholecalciferol, d3, (CALCIUM 600 + D) 600-125 mg-unit tab Take  by mouth.  Biotin 2,500 mcg cap Take  by mouth.  lutein 20 mg cap Take  by mouth.       fish oil/borage/flax/om3,6,9 1 (TRIPLE OMEGA 3-6-9 PO) Take 2 Caps by mouth two (2) times a day. Plan:  Obtain authorization for testing from insurance company. Report to follow once testing, scoring, and interpretation completed. ? Organic based neurocognitive issues versus mood disorder or combination of same. ? Problems organic, functional, or both? This note will not be viewable in [address]

## 2021-03-03 ENCOUNTER — OFFICE VISIT (OUTPATIENT)
Dept: NEUROLOGY | Age: 86
End: 2021-03-03
Payer: MEDICARE

## 2021-03-03 DIAGNOSIS — G31.84 MILD COGNITIVE IMPAIRMENT: Primary | ICD-10-CM

## 2021-03-03 DIAGNOSIS — Z86.59 HISTORY OF ANXIETY: ICD-10-CM

## 2021-03-03 DIAGNOSIS — F41.8 ANXIETY ABOUT HEALTH: ICD-10-CM

## 2021-03-03 DIAGNOSIS — R41.840 ATTENTION DEFICIT: ICD-10-CM

## 2021-03-03 PROCEDURE — 96133 NRPSYC TST EVAL PHYS/QHP EA: CPT | Performed by: CLINICAL NEUROPSYCHOLOGIST

## 2021-03-03 PROCEDURE — 96137 PSYCL/NRPSYC TST PHY/QHP EA: CPT | Performed by: CLINICAL NEUROPSYCHOLOGIST

## 2021-03-03 PROCEDURE — 96139 PSYCL/NRPSYC TST TECH EA: CPT | Performed by: CLINICAL NEUROPSYCHOLOGIST

## 2021-03-03 PROCEDURE — 96138 PSYCL/NRPSYC TECH 1ST: CPT | Performed by: CLINICAL NEUROPSYCHOLOGIST

## 2021-03-03 PROCEDURE — 96132 NRPSYC TST EVAL PHYS/QHP 1ST: CPT | Performed by: CLINICAL NEUROPSYCHOLOGIST

## 2021-03-03 PROCEDURE — 96136 PSYCL/NRPSYC TST PHY/QHP 1ST: CPT | Performed by: CLINICAL NEUROPSYCHOLOGIST

## 2021-03-03 NOTE — LETTER
3/5/2021 Patient: Lei Chow YOB: 1935 Date of Visit: 3/3/2021 Shashank Solis MD 
[address] [facility] [address] [facility] [address] [address] Via Fax: [phone] Dear Shashank Solis MD, Thank you for referring Ms. Lei Chow to [facility] for evaluation. My notes for this consultation are attached. If you have questions, please do not hesitate to call me. I look forward to following your patient along with you. Sincerely, Jl Novoa PsyD

## 2021-03-06 NOTE — PROGRESS NOTES
[address] [facility],[address]  [address]   [place] [address] [facility]   Akbar Chand    [phone] Office   [phone] Fax      Neuropsychological Evaluation Report      Referral:  Marion Olivier MD, Dr. MontagueAnadafne Blum is a 82 y.o. right handed   female who was unaccompanied to the initial clinical interview on 1/14/21 . Please refer to her medical records for details pertaining to her history. At the start of the appointment, I reviewed the [facility] Chart (including Media scanned in from previous providers) for the active Problem List, all pertinent Past Medical Hx, medications, recent radiologic and laboratory findings. In addition, I reviewed pt's documented Immunization Record and Encounter History. She completed college without history of previously diagnosed LD and/or receipt of special education services. Moved here from [facility] and son is here nearby. The patient has noticed a progressive decline in short term memory. Forgets the content of conversations. Misplaces things. Starts tasks and does not complete. Longer to come up with a name. Longer time to come up with a thought. Long term memory is not an issue. She will go into a room and forget why. She will be asked what she had for dinner and forgets. Problems going on about 8 months, slow and progressive. No acute or focal issues. No changes in sense of taste or smell. No known stroke, meningitis/encephalitis, MARILU Fever, Lupus, Lyme, TBI, sz, etc.  Driving is okay, but she has not been in [place] all that long (1 year) so has to use her iphone for directions and such. Her girlfriend who still lives in [place] and with whom she speaks regularly will sometimes tell her that she is repeating herself. Talked to PCP and referred here for evaluation. No dangerous behaviors.   No difficulty with paying bills or remembering medicines. She uses a pillbox. No family history of dementia. Her mother and brother  of polycystic kidney disease. She has some anxiety. No counseling or psychiatrist. Some constipation. She has no headache. No numbness or tingling. No focal complaints. No visual change. No loss or alteration in consciousness. She has not had any imaging. No counseling or psychiatrist.        HEAD CT WITHOUT CONTRAST: 2020 4:13 PM     INDICATION: memory loss     COMPARISON: None.     PROCEDURE: Axial images of the head were obtained without contrast. Coronal and  sagittal reformats were performed. CT dose reduction was achieved through use of  a standardized protocol tailored for this examination and automatic exposure  control for dose modulation. Adaptive statistical iterative reconstruction  (ASIR) was utilized.     FINDINGS: Periventricular white matter hypodensity is nonspecific, but  consistent with mild chronic small vessel ischemic disease. The ventricles and  sulci are appropriate in size and configuration for age. No loss of gray-white  differentiation to suggest late acute or early subacute infarction. No mass  effect or intracranial hemorrhage.     IMPRESSION  IMPRESSION: No acute intracranial abnormality. No previous neuropsych.      Neuropsychological Mental Status Exam (NMSE):      Historian: Good  Praxis: No UE apraxia  R/L Orientation: Intact to self and to other  Dress: within normal limits   Weight: within normal limits   Appearance/Hygiene: within normal limits   Gait: within normal limits   Assistive Devices: None  Mood: within normal limits   Affect: within normal limits   Comprehension: within normal limits   Thought Process: within normal limits   Expressive Language: within normal limits   Receptive Language: within normal limits   Motor:  No cognitive or motor perseveration  ETOH: Once in a while, not a drinker  Tobacco: Denied  Illicit: Denied  SI/HI: Denied  Psychosis: Denied  Insight: Within normal limits  Judgment: Within normal limits  Other Psych:      Past Medical History:   Diagnosis Date    Anxiety disorder     Constipation     Essential hypertension     Memory disorder     short term       Past Surgical History:   Procedure Laterality Date    HX APPENDECTOMY      HX TONSILLECTOMY         Allergies   Allergen Reactions    Sulfa (Sulfonamide Antibiotics) Unknown (comments)       Family History   Problem Relation Age of Onset    Kidney Disease Mother         polycystic kidney dz    Kidney Disease Brother         polycystic kidney dz    Kidney Disease Maternal Uncle         polycystic kidney dz       Social History     Tobacco Use    Smoking status: Never Smoker    Smokeless tobacco: Never Used   Substance Use Topics    Alcohol use: Yes     Comment: occasional glass of wine    Drug use: Not on file       Current Outpatient Medications   Medication Sig Dispense Refill    losartan-hydroCHLOROthiazide (HYZAAR) 100-25 mg per tablet Take 1 Tab by mouth daily.  metoprolol succinate (TOPROL-XL) 50 mg XL tablet Take  by mouth daily.  simvastatin (ZOCOR) 40 mg tablet Take  by mouth nightly.  hydrALAZINE (APRESOLINE) 25 mg tablet Take 25 mg by mouth three (3) times daily.  omeprazole (PRILOSEC) 40 mg capsule Take 40 mg by mouth daily.  sucralfate (CARAFATE) 1 gram tablet Take 1 g by mouth four (4) times daily.  venlafaxine-SR (Effexor XR) 75 mg capsule Take  by mouth daily.  multivit-min/iron/folic/lutein (CENTRUM SILVER WOMEN PO) Take  by mouth.  vitamin E (AQUA GEMS) 400 unit capsule Take  by mouth daily.  aspirin 81 mg chewable tablet Take 81 mg by mouth daily.  calcium-cholecalciferol, d3, (CALCIUM 600 + D) 600-125 mg-unit tab Take  by mouth.  Biotin 2,500 mcg cap Take  by mouth.  lutein 20 mg cap Take  by mouth.       fish oil/borage/flax/om3,6,9 1 (TRIPLE OMEGA 3-6-9 PO) Take 2 Caps by mouth two (2) times a day.           Plan:  Obtain authorization for testing from insurance company. Report to follow once testing, scoring, and interpretation completed. ? Organic based neurocognitive issues versus mood disorder or combination of same. ? Problems organic, functional, or both? This note will not be viewable in [address] Neuropsychological Test Results  Patient Testing 3/3/21 Report Completed 3/5/21  A Psychometrist Assisted w/ portions of this evaluation while under my direct  supervision    The following evaluation procedures/tests were administered:      Neuropsychologist Performed, Interpreted, & Reported:  Neuropsychological Mental Status Exam, Revised Memory & Behavior Checklist,  Mini Mental Status Exam, Clock Drawing Test, Gabe-Melzack Pain Questionnaire, Test Of Premorbid Functioning, History Taking  & Clinical Interview With The Patient, BRYCE, CPT-III, Review Of Available Records. Psychometrist Administered under Neuropsychologist Supervision & Neuropsychologist Interpreted & Neuropsychologist Reported:  Verbal Fluency Tests, Yamil & Yamil  Revised, Trailmaking Test Parts A & B, Wechsler Adult Intelligence Scale - IV, [place] [employer]  3, Grooved Pegboard, Dick Depression Inventory  II, Dick Anxiety Inventory. Test Findings:  Test Findings:  Note:  The patients raw data have been compared with currently available norms which include demographic corrections for age, gender, and/or education. Sometimes, the patients scores are compared to demographically similar individuals as close to the patients age, education level, etc., as possible. \"Average\" is viewed as being +/- 1 standard deviation (SD) from the stated mean for a particular test score. \"Low average\" is viewed as being between 1 and 2 SD below the mean, and above average is viewed as being 1 and 2 SD above the mean.   Scores falling in the borderline range (between 1-1/2 and 2 SD below the mean) are viewed with particular attention as to whether they are normal or abnormal neurocognitive test scores. Other methods of inference in analyzing the test data are also utilized, including the pattern and range of scores in the profile, bilateral motor functions, and the presence, if any, of pathognomonic signs. Behaviorally, the patient was friendly and cooperative and appeared motivated to perform well during this examination. Within this context, the results of this evaluation are viewed as a valid reflection of the patients actual neurocognitive and emotional status. The patient's score of 28/30 on the Mini-Mental Status Exam was normal.  In this regard, she was not oriented to date. Recall 2/3. Clock drawing as normal.        Her structured word list fluency, as assessed by the FAS Test, was within the average range with a T score of 46. Category fluency was within the moderately to severely impaired average range with a T score of 23. Confrontation naming ability, as assessed by the [facility] Revised, was within the below average range at 46/60 correct (T = 41). This pattern of performance is not strongly indicative of a patient who is at increased risk for day-to-day problems with verbal fluency or confrontation naming. The patient was administered the [facility] Continuous Performance Test  III and review of the subscales within this instrument revealed moderate concerns for inattentiveness without impulsivity. This pattern of performance is indicative of a patient who is at increased risk for day-to-day problems with sustained visual attention/concentration. The patient is not showing problems with working memory capacity (37th %ile) or processing speed (70th %ile) on the WAIS-IV. Her Verbal Comprehension Index score of 102 was average. Her Perceptual Reasoning Index score of 104 was average.   These scores do not reflect a decline in functioning based on an assessment of premorbid functioning. The patient was administered the [place] Verbal Learning Test  - 3 and generated  Normal range and positive learning curve over five repeated auditory word list learning trials. An interference trial was normal.   Free and cued, short and long delayed recall were within the normal range, though somewhat low end of the normal range. Recognition recall was normally (50th %ile). This pattern of performance is not strongly indicative of a patient who is at increased risk for day-to-day problems with auditory learning and/or memory. Simple timed visual motor sequencing (Trailmaking Test Part A) was within the average range with a T score of 47. Her performance on a similar, but more complex task of timed visual motor sequencing (Trailmaking Test Part B) was within the above average range with a T score of 56. She made zero sequencing errors on this latter completed test.  This pattern of performance is not indicative of a patient who is at increased risk for day-to-day problems with executive functioning. Fine motor dexterity was within the normal range bilaterally. This does not raise concern for a particularly lateralized brain dysfunction. The patient rated her current level of pain as \"0/5- No Pain\" on the Gabe-Melzack Pain Questionnaire. Her Dick Depression Inventory II score of 2 was within the minimally depressed range. Her Dick Anxiety Inventory score of 6 reflected minimal anxiety. The patient's responses on the Personality Assessment Inventory were deemed valid for interpretation, though some defensiveness in responding is reported. Within this context, the BRYCE clinical profile is normal.  There is no evidence of clinically significant psychopathology in the personality profile. Impressions & Recommendations: This is a predominantly normal range Neuropsychological Evaluation with respect to neurocognitive functioning.   In this regard, moderate impairment is noted for sustained visual attention and a mild to moderate problem with verbal fluency is seen. Otherwise, her performances across all other neurocognitive domains assessed, including mental status, confrontation naming, verbal comprehension, perceptual reasoning, working memory, processing speed, auditory learning, auditory recognition recall, executive functioning, and bilateral motor skills remain fully within the normal range. Some of her memory scores are just at the cusp of what is considered normal versus borderline. Overall, though, the score profile is normal.  From an emotional standpoint, the patient somewhat defensively denied clinically significant psychopathology, though she does report some adjustment related anxiety. Thankfully, this profile is not consistent with dementia. Instead, this is consistent with a mild cognitive impairment and it is unclear as to whether or not her attention deficit issues are acquired or if they are chronic. In either scenario, she struggles significantly with focusing on information that she is trying to learn, thus struggles learning new information and of course then later on cannot remember that newly learned information. This is an input problem, not an output problem. For now, at least, this is very reassuring news. 50% of patients with MCI in turn to develop dementia, and 50% do not. In addition to continued medical care, my recommendations include consideration for appropriate medication for attention if this is not medically contraindicated. Of course, caution is advised in selecting appropriate medication for attention for her, given her age and other factors. Her emotional status does need to be monitored over time. I am not currently concerned about competency, day-to-day supervision, etc.  The patient should be encouraged to remain as mentally, physically, and socially active as possible.   I would like to keep an eye on how her memory is doing over time, and the serial neurocognitive testing is indicated on a yearly or as needed basis. We now have extensive baseline data on her. I have hope she is reassured by the mostly positive nature of these test results. Clinical correlation is, course, indicated. I will discuss these findings with the patient when she follows up with me in the near future. A follow up Neuropsychological Evaluation is indicated on a prn basis, especially if there are any cognitive and/or emotional changes. 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 If there is any additional information of which I am currently unaware, I would be more than happy to review it upon having it made available to me. Thank you for the opportunity to see this interesting individual.     Sincerely,       Nino Benz. Maureen Quispe PsyD, EdS    CC:  Darion Casey MD    Time Documentation:    86303*8 31686*0     46003 x 1  85337 x 5 Test Administration/Data Gathering By Technician: (3 hours). 11369 x 1 (first 30 minutes), 59790 x 5 (each additional 30 minutes)    96132 x 1  96133 x 1 Testing Evaluation Services by Neuropsychologist (1 hour, 50 minutes) 96132 x 1 (first hour), 96133 x 1 (50 minutes)    Definitions:      42565/14257:  Neurobehavioral Status Exam, Clinical interview. Clinical assessment of thinking, reasoning and judgment, by neuropsychologist, both face to face time with patient and time interpreting those test results and reporting, first and subsequent hours)    66041/23321: Neuropsychological Test Administration by Technician/Psychometrist, first 30 minutes and each additional 30 minutes. The above includes: Record review. Review of history provided by patient. Review of collaborative information. Testing by Clinician. Review of raw data. Scoring.  Report writing of individual tests administered by Clinician. Integration of individual tests administered by psychometrist with NSE/testing by clinician, review of records/history/collaborative information, case Conceptualization, treatment planning, clinical decision making, report writing, coordination Of Care. Psychometry test codes as time spent by psychometrist administering and scoring neurocognitive/psychological tests under supervision of neuropsychologist.  Integral services including scoring of raw data, data interpretation, case conceptualization, report writing etcetera were initiated after the patient finished testing/raw data collected and was completed on the date the report was signed.

## 2021-04-09 ENCOUNTER — OFFICE VISIT (OUTPATIENT)
Dept: NEUROLOGY | Age: 86
End: 2021-04-09
Payer: MEDICARE

## 2021-04-09 DIAGNOSIS — G31.84 MILD COGNITIVE IMPAIRMENT: Primary | ICD-10-CM

## 2021-04-09 DIAGNOSIS — F41.8 ANXIETY ABOUT HEALTH: ICD-10-CM

## 2021-04-09 PROCEDURE — 90832 PSYTX W PT 30 MINUTES: CPT | Performed by: CLINICAL NEUROPSYCHOLOGIST

## 2021-04-09 NOTE — PROGRESS NOTES
Prior to seeing the patient I reviewed the records, including the previously completed report, the records in [place], and any updated visits from other providers since I saw the patient last.      Today, I engaged in a psychoeducational and supportive and cognitive/behavioral psychotherapy session with the patient. I provided psychotherapy in the form of psychoeducation and support with respect to the results of the recent Neuropsychological Evaluation, including discussing individual tests as well as patient's areas of neurocognitive strength versus weakness. We discussed, in detail, the following: This is a predominantly normal range Neuropsychological Evaluation with respect to neurocognitive functioning. In this regard, moderate impairment is noted for sustained visual attention and a mild to moderate problem with verbal fluency is seen. Otherwise, her performances across all other neurocognitive domains assessed, including mental status, confrontation naming, verbal comprehension, perceptual reasoning, working memory, processing speed, auditory learning, auditory recognition recall, executive functioning, and bilateral motor skills remain fully within the normal range. Some of her memory scores are just at the cusp of what is considered normal versus borderline. Overall, though, the score profile is normal.  From an emotional standpoint, the patient somewhat defensively denied clinically significant psychopathology, though she does report some adjustment related anxiety.                   Thankfully, this profile is not consistent with dementia. Instead, this is consistent with a mild cognitive impairment and it is unclear as to whether or not her attention deficit issues are acquired or if they are chronic.   In either scenario, she struggles significantly with focusing on information that she is trying to learn, thus struggles learning new information and of course then later on cannot remember that newly learned information. This is an input problem, not an output problem. For now, at least, this is very reassuring news. 50% of patients with MCI in turn to develop dementia, and 50% do not. In addition to continued medical care, my recommendations include consideration for appropriate medication for attention if this is not medically contraindicated. Of course, caution is advised in selecting appropriate medication for attention for her, given her age and other factors. Her emotional status does need to be monitored over time. I am not currently concerned about competency, day-to-day supervision, etc.  The patient should be encouraged to remain as mentally, physically, and socially active as possible. I would like to keep an eye on how her memory is doing over time, and the serial neurocognitive testing is indicated on a yearly or as needed basis. We now have extensive baseline data on her. I have hope she is reassured by the mostly positive nature of these test results. Clinical correlation is, course, indicated.                 I will discuss these findings with the patient when she follows up with me in the near future. A follow up Neuropsychological Evaluation is indicated on a prn basis, especially if there are any cognitive and/or emotional changes.       DIAGNOSES:             MCI Without Memory Loss (attention and verbal fluency)                                      Anxiety about health      Education was provided regarding my diagnostic impressions, and we discussed treatment plan/options. I also answered numerous questions related to the clinical findings, including discussing various methods to improve cognition and mood. Counseling provided regarding mood and cognition. CBT and supportive psychotherapy techniques were utilized.   Supportive/Cognitive Behavioral/Solution Focused psychotherapy provided  Discussed rational versus irrational thinking patterns and their consequences. Discussed healthy/adaptive and unhealthy/maladaptive coping. The patient had a recent fall and of course and is concerned but no hit to the head. She has no dementia which is very reassuring. Discussed numerous strategies to account for this. KEep an eye on self with respect to mood concerns. Alittle anxiety. Follow up prn. Stay active, tasks assigned with respect to daily activities. This has been stressful for her and we spend time debriefing the testing also.       The patient had the following concerns which I deferred to their referring provider: med for attention      Time spent today: 25

## 2021-05-18 NOTE — PROCEDURE: ADDITIONAL NOTES
Additional Notes: This is pruritus, predominantly of the trunk, for about 2+ months, although it began nebulously.  OTC HC cream seemed to help a little.  She related it possibly to changing the dose on her LEXAPRO, but stopped that 2 weeks ago and the itching persists.  She has never had anything like this before.  She uses DIAL soap as well as [employer] gels.  I saw her 3 days ago but she came back today because the itching is persistent.\\n\\nThere is no rash today.  There is mild xerosis.\\n\\nDiscussed skin diet to monitor soaps, moisturizers, detergents.\\n\\nDiscontinue Dial / BBW cleanser.\\n\\nCont gentle wash Qd\\nCont moisturizing daily \\nD/C Dermend Anti itch lotion as this seemed to WORSEN the itching.\\nCont Triamcinolone cream bid x2 weeks / month as needed, but should not apply to back at this time.\\n\\nAnticipate doing PATCH TEST on [facility] next week.  Discussed this process and what to expect in detail.  Would've attempted IM steroids but deferred so will be able to PATCH TEST.\\n\\nAdvised since discontinuing Lexapro give it at least 6 to 8 weeks.\\n\\nRto in 1 month if rash worsens will consider extended blood work or possibly bx at that time.
Detail Level: Simple
nontender/no distention/no masses palpable/bowel sounds normal/no bruit/no rebound tenderness/no guarding/no rigidity/no organomegaly

## 2022-02-16 NOTE — PROCEDURE: MIPS QUALITY
Detail Level: Detailed
Quality 111:Pneumonia Vaccination Status For Older Adults: Pneumococcal Vaccination Previously Received
Name And Contact Information For Health Care Proxy: Artur Bradford [phone]
Quality 47: Advance Care Plan: Advance Care Planning discussed and documented; advance care plan or surrogate decision maker documented in the medical record.
Quality 226: Preventive Care And Screening: Tobacco Use: Screening And Cessation Intervention: Patient screened for tobacco and never smoked
Quality 110: Preventive Care And Screening: Influenza Immunization: Influenza immunization was not ordered or administered, reason not given
Patient
Quality 431: Preventive Care And Screening: Unhealthy Alcohol Use - Screening: Patient screened for unhealthy alcohol use using a single question and scores less than 2 times per year
Quality 130: Documentation Of Current Medications In The Medical Record: Current Medications Documented

## 2022-03-02 ENCOUNTER — OFFICE VISIT (OUTPATIENT)
Dept: NEUROLOGY | Age: 87
End: 2022-03-02
Payer: MEDICARE

## 2022-03-02 ENCOUNTER — OFFICE VISIT (OUTPATIENT)
Dept: NEUROLOGY | Age: 87
End: 2022-03-02

## 2022-03-02 VITALS
SYSTOLIC BLOOD PRESSURE: 137 MMHG | DIASTOLIC BLOOD PRESSURE: 57 MMHG | RESPIRATION RATE: 16 BRPM | BODY MASS INDEX: 27.34 KG/M2 | WEIGHT: 140 LBS | TEMPERATURE: 97.8 F | OXYGEN SATURATION: 96 % | HEART RATE: 65 BPM

## 2022-03-02 DIAGNOSIS — R41.89 COGNITIVE DECLINE: ICD-10-CM

## 2022-03-02 DIAGNOSIS — G31.84 MILD COGNITIVE IMPAIRMENT: Primary | ICD-10-CM

## 2022-03-02 DIAGNOSIS — R41.0 CONFUSION: ICD-10-CM

## 2022-03-02 DIAGNOSIS — Z86.59 HISTORY OF ANXIETY: ICD-10-CM

## 2022-03-02 DIAGNOSIS — F41.8 ANXIETY ABOUT HEALTH: ICD-10-CM

## 2022-03-02 PROCEDURE — G8419 CALC BMI OUT NRM PARAM NOF/U: HCPCS | Performed by: PSYCHIATRY & NEUROLOGY

## 2022-03-02 PROCEDURE — 99214 OFFICE O/P EST MOD 30 MIN: CPT | Performed by: PSYCHIATRY & NEUROLOGY

## 2022-03-02 PROCEDURE — G8536 NO DOC ELDER MAL SCRN: HCPCS | Performed by: PSYCHIATRY & NEUROLOGY

## 2022-03-02 PROCEDURE — 1101F PT FALLS ASSESS-DOCD LE1/YR: CPT | Performed by: PSYCHIATRY & NEUROLOGY

## 2022-03-02 PROCEDURE — G8427 DOCREV CUR MEDS BY ELIG CLIN: HCPCS | Performed by: PSYCHIATRY & NEUROLOGY

## 2022-03-02 PROCEDURE — 1090F PRES/ABSN URINE INCON ASSESS: CPT | Performed by: PSYCHIATRY & NEUROLOGY

## 2022-03-02 PROCEDURE — G8400 PT W/DXA NO RESULTS DOC: HCPCS | Performed by: PSYCHIATRY & NEUROLOGY

## 2022-03-02 PROCEDURE — G8510 SCR DEP NEG, NO PLAN REQD: HCPCS | Performed by: PSYCHIATRY & NEUROLOGY

## 2022-03-02 PROCEDURE — 90791 PSYCH DIAGNOSTIC EVALUATION: CPT | Performed by: CLINICAL NEUROPSYCHOLOGIST

## 2022-03-02 NOTE — PROGRESS NOTES
[facility] and [address] at [facility] at [address] [facility], [address] [place] [address] [facility], [address] [place] [address]   [phone]              Chief Complaint   Patient presents with    Results     had neurocog testing in March 2021. progression of memory decline, forgetfulness, repitition     Current Outpatient Medications   Medication Sig Dispense Refill    losartan-hydroCHLOROthiazide (HYZAAR) 100-25 mg per tablet Take 1 Tab by mouth daily.  metoprolol succinate (TOPROL-XL) 50 mg XL tablet Take  by mouth daily.  simvastatin (ZOCOR) 40 mg tablet Take  by mouth nightly.  hydrALAZINE (APRESOLINE) 25 mg tablet Take 25 mg by mouth three (3) times daily.  omeprazole (PRILOSEC) 40 mg capsule Take 40 mg by mouth daily.  sucralfate (CARAFATE) 1 gram tablet Take 1 g by mouth four (4) times daily.  venlafaxine-SR (Effexor XR) 75 mg capsule Take  by mouth daily.  aspirin 81 mg chewable tablet Take 81 mg by mouth daily.  calcium-cholecalciferol, d3, (CALCIUM 600 + D) 600-125 mg-unit tab Take  by mouth.  Biotin 2,500 mcg cap Take  by mouth.  multivit-min/iron/folic/lutein (CENTRUM SILVER WOMEN PO) Take  by mouth.  vitamin E (AQUA GEMS) 400 unit capsule Take  by mouth daily.  lutein 20 mg cap Take  by mouth.  fish oil/borage/flax/om3,6,9 1 (TRIPLE OMEGA 3-6-9 PO) Take 2 Caps by mouth two (2) times a day. Allergies   Allergen Reactions    Sulfa (Sulfonamide Antibiotics) Unknown (comments)     Social History     Tobacco Use    Smoking status: Never Smoker    Smokeless tobacco: Never Used   Substance Use Topics    Alcohol use: Yes     Comment: occasional glass of wine    Drug use: Not on file     28-year-old lady returns today for follow-up. I saw her for an initial consultation in November 2020 for cognitive decline.   We sent her for several tests  Carotid Doppler unremarkable  EEG unremarkable  CT of the head unremarkable  She had her formal neuropsychological testing performed in April 2021 and this demonstrated a predominantly normal range evaluation. Dr. Huseyin Jimenez noted \"thankfully, this profile is not consistent with dementia. \"  He was not concerned about competency her day-to-day supervision etc.  He recommended follow-up testing in a year    Today she is here with her daughter-in-law. The patient tells me that her memory is fine and she is just getting older. She is forgetful at times. Her daughter-in-law notes that there is been a significant decline. Daughter-in-law's paying her bills to make sure they get paid. Her  is filling out her medicines. She is much more forgetful. She repeats things. She cannot remember if she ate breakfast.    Examination  Visit Vitals  BP (!) 137/57 (BP 1 Location: Left upper arm, BP Patient Position: Sitting, BP Cuff Size: Adult)   Pulse 65   Temp 97.8 °F (36.6 °C)   Resp 16   Wt 63.5 kg (140 lb)   SpO2 96%   BMI 27.34 kg/m²   Pleasant well-appearing lady. Awake alert oriented to the correct day date and year. Knows the president and the previous president but not the prior president to that. She says around the 6 floor but were on the second and she did push the elevator button. She says the war in [facility] is in the news      Impression/Plan  57-year-old lady with mild cognitive impairment and worsening of cognitive function question whether she is progressing into an Alzheimer's type dementia  We will get her repeat testing done and the patient in fact thought she was coming for that testing today so we will try to expedite    Conan Lesch, MD        This note was created using voice recognition software. Despite editing, there may be syntax errors.

## 2022-03-02 NOTE — PROGRESS NOTES
[address] [facility],[address]  [address]   [address] [place] [address] [facility]   Akbar Chand    [phone] Office   [phone] Fax      Neuropsychology    Exam # 2      Initial Diagnostic Interview Note      Referral:  Michele Tyson MD    Zaina Vickers is a 82 y.o. right handed   who was accompanied by her daughter-in-law to the initial clinical interview on 3/2/2022. Please refer to her medical records for details pertaining to her history. At the start of the appointment, I reviewed the patient's [facility] Epic Chart (including Media scanned in from previous providers) for the active Problem List, all pertinent Past Medical Hx, medications, recent radiologic and laboratory findings. In addition, I reviewed pt's documented Immunization Record and Encounter History. When I saw her last:    Zaina Vickers is a 82 y.o. right handed   female who was unaccompanied to the initial clinical interview on 1/14/21 . Please refer to her medical records for details pertaining to her history. At the start of the appointment, I reviewed the patient's [facility] Epic Chart (including Media scanned in from previous providers) for the active Problem List, all pertinent Past Medical Hx, medications, recent radiologic and laboratory findings.  In addition, I reviewed pt's documented Immunization Record and Encounter History.     She completed college without history of previously diagnosed LD and/or receipt of special education services. Moved here from [facility] and son is here nearby. The patient has noticed a progressive decline in short term memory. Forgets the content of conversations. Misplaces things. Starts tasks and does not complete. Longer to come up with a name. Longer time to come up with a thought. Long term memory is not an issue. She will go into a room and forget why.  She will be asked what she had for dinner and forgets. Problems going on about 8 months, slow and progressive. No acute or focal issues. No changes in sense of taste or smell. No known stroke, meningitis/encephalitis, MARILU Fever, Lupus, Lyme, TBI, sz, etc.  Driving is okay, but she has not been in [place] all that long (1 year) so has to use her iphone for directions and such. Her girlfriend who still lives in [place] and with whom she speaks regularly will sometimes tell her that she is repeating herself. Talked to PCP and referred here for evaluation.  No dangerous behaviors.  No difficulty with paying bills or remembering medicines.  She uses a pillbox.  No family history of dementia. Anna Jane mother and brother  of polycystic kidney disease. She has some anxiety. No counseling or psychiatrist. Some constipation.  She has no headache.  No numbness or tingling.  No focal complaints.  No visual change.  No loss or alteration in consciousness.  She has not had any imaging. No counseling or psychiatrist.         HEAD CT WITHOUT CONTRAST: 2020 4:13 PM     INDICATION: memory loss     COMPARISON: None.     PROCEDURE: Axial images of the head were obtained without contrast. Coronal and  sagittal reformats were performed. CT dose reduction was achieved through use of  a standardized protocol tailored for this examination and automatic exposure  control for dose modulation.  Adaptive statistical iterative reconstruction  (ASIR) was utilized.     FINDINGS: Periventricular white matter hypodensity is nonspecific, but  consistent with mild chronic small vessel ischemic disease. The ventricles and  sulci are appropriate in size and configuration for age. No loss of gray-white  differentiation to suggest late acute or early subacute infarction. No mass  effect or intracranial hemorrhage.     IMPRESSION  IMPRESSION: No acute intracranial abnormality.        No previous neuropsych. Dx in :      This is a predominantly normal range Neuropsychological Evaluation with respect to neurocognitive functioning. In this regard, moderate impairment is noted for sustained visual attention and a mild to moderate problem with verbal fluency is seen. Otherwise, her performances across all other neurocognitive domains assessed, including mental status, confrontation naming, verbal comprehension, perceptual reasoning, working memory, processing speed, auditory learning, auditory recognition recall, executive functioning, and bilateral motor skills remain fully within the normal range. Some of her memory scores are just at the cusp of what is considered normal versus borderline. Overall, though, the score profile is normal.  From an emotional standpoint, the patient somewhat defensively denied clinically significant psychopathology, though she does report some adjustment related anxiety.                   Thankfully, this profile is not consistent with dementia. Instead, this is consistent with a mild cognitive impairment and it is unclear as to whether or not her attention deficit issues are acquired or if they are chronic. In either scenario, she struggles significantly with focusing on information that she is trying to learn, thus struggles learning new information and of course then later on cannot remember that newly learned information. This is an input problem, not an output problem. For now, at least, this is very reassuring news. 50% of patients with MCI in turn to develop dementia, and 50% do not. In addition to continued medical care, my recommendations include consideration for appropriate medication for attention if this is not medically contraindicated. Of course, caution is advised in selecting appropriate medication for attention for her, given her age and other factors. Her emotional status does need to be monitored over time.   I am not currently concerned about competency, day-to-day supervision, etc.  The patient should be encouraged to remain as mentally, physically, and socially active as possible. I would like to keep an eye on how her memory is doing over time, and the serial neurocognitive testing is indicated on a yearly or as needed basis. We now have extensive baseline data on her. I have hope she is reassured by the mostly positive nature of these test results. Clinical correlation is, course, indicated.                 I will discuss these findings with the patient when she follows up with me in the near future. A follow up Neuropsychological Evaluation is indicated on a prn basis, especially if there are any cognitive and/or emotional changes.       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 Patient herself believes that she is getting older and thus her memory is declining appropriately for age. She does endorse forgetfulness. Daughter-in-law, however, notes significant decline since she was seen over the past year. The daughter-in-law is now paying the patient's bills to ensure that they are properly done. He repeats herself, misplaces things, starts tasks and does not complete. Goes into her room and forgets why. She cannot remember what she had for breakfast or what she did to be the day before consistently. Remains independent for her her ADLs. No major sleep or appetite changes. No senses changing-no changes in sense of taste or smell, etc.  No acute or focal issues. No new psychiatric concerns. Patient herself notices an increase in short term memory issues. She doesn't drive too far from the home. DIL states she gets reminders for medications, there is an automatic pillbox. Son and DIL have noticed a decline in functioning. Using electronic pillbox.   Son fills the medications. She will forget to eat and has to be reminded to eat. There is some memory decline there. Confusion about dates and times and such. There is a repetitiveness to her speech- in 10 minutes may say the same thing. Moreso than before. Patient concurs with cues. She has had two falls- she states she trips, DIL disagrees. She is preparing her meals. Family buys prepared meals. Son goes every week, at least once, to check on things. She is always saying she has nausea. They checked her medically and all seems to be well. Stress? She was switched from Effexor to Paxil, and this is done by Dr. María Romo. Visiting Venu Heller comes every day now, and she lacks motivation to do things unless someone is coming to visit.   Otherwise, she would sit in living room, laying on couch, laying in the dark with no tv or anything goingon.        Neuropsychological Mental Status Exam (NMSE):        Historian: Good  Praxis: No UE apraxia  R/L Orientation: Intact to self and to other  Dress: within normal limits   Weight: within normal limits   Appearance/Hygiene: within normal limits   Gait: within normal limits   Assistive Devices: None  Mood: within normal limits   Affect: within normal limits   Comprehension: within normal limits   Thought Process: within normal limits   Expressive Language: within normal limits   Receptive Language: within normal limits   Motor:  No cognitive or motor perseveration  ETOH: Once in a while, not a drinker  Tobacco: Denied  Illicit: Denied  SI/HI: Denied  Psychosis: Denied  Insight: Within normal limits  Judgment: Within normal limits  Other Psych:      Past Medical History:   Diagnosis Date    Anxiety disorder     Constipation     Essential hypertension     Memory disorder     short term       Past Surgical History:   Procedure Laterality Date    HX APPENDECTOMY      HX TONSILLECTOMY         Allergies   Allergen Reactions    Sulfa (Sulfonamide Antibiotics) Unknown (comments)       Family History   Problem Relation Age of Onset    Kidney Disease Mother         polycystic kidney dz    Kidney Disease Brother         polycystic kidney dz    Kidney Disease Maternal Uncle         polycystic kidney dz       Social History     Tobacco Use    Smoking status: Never Smoker    Smokeless tobacco: Never Used   Substance Use Topics    Alcohol use: Yes     Comment: occasional glass of wine    Drug use: Not on file       Current Outpatient Medications   Medication Sig Dispense Refill    losartan-hydroCHLOROthiazide (HYZAAR) 100-25 mg per tablet Take 1 Tab by mouth daily.  metoprolol succinate (TOPROL-XL) 50 mg XL tablet Take  by mouth daily.  simvastatin (ZOCOR) 40 mg tablet Take  by mouth nightly.  hydrALAZINE (APRESOLINE) 25 mg tablet Take 25 mg by mouth three (3) times daily.  omeprazole (PRILOSEC) 40 mg capsule Take 40 mg by mouth daily.  sucralfate (CARAFATE) 1 gram tablet Take 1 g by mouth four (4) times daily.  venlafaxine-SR (Effexor XR) 75 mg capsule Take  by mouth daily.  multivit-min/iron/folic/lutein (CENTRUM SILVER WOMEN PO) Take  by mouth.  vitamin E (AQUA GEMS) 400 unit capsule Take  by mouth daily.  aspirin 81 mg chewable tablet Take 81 mg by mouth daily.  calcium-cholecalciferol, d3, (CALCIUM 600 + D) 600-125 mg-unit tab Take  by mouth.  Biotin 2,500 mcg cap Take  by mouth.  lutein 20 mg cap Take  by mouth.  fish oil/borage/flax/om3,6,9 1 (TRIPLE OMEGA 3-6-9 PO) Take 2 Caps by mouth two (2) times a day. Plan:  Obtain authorization for testing from insurance company. Report to follow once testing, scoring, and interpretation completed. ? Organic based neurocognitive issues versus mood disorder or combination of same. ? Problems organic, functional, or both? This note will not be viewable in [address]

## 2022-04-19 ENCOUNTER — OFFICE VISIT (OUTPATIENT)
Dept: NEUROLOGY | Age: 87
End: 2022-04-19
Payer: MEDICARE

## 2022-04-19 DIAGNOSIS — Z86.59 HISTORY OF ANXIETY: ICD-10-CM

## 2022-04-19 DIAGNOSIS — G30.1 LATE ONSET ALZHEIMER'S DEMENTIA WITHOUT BEHAVIORAL DISTURBANCE (HCC): Primary | ICD-10-CM

## 2022-04-19 DIAGNOSIS — F02.80 LATE ONSET ALZHEIMER'S DEMENTIA WITHOUT BEHAVIORAL DISTURBANCE (HCC): Primary | ICD-10-CM

## 2022-04-19 DIAGNOSIS — F41.8 ANXIETY ABOUT HEALTH: ICD-10-CM

## 2022-04-19 PROCEDURE — 96137 PSYCL/NRPSYC TST PHY/QHP EA: CPT | Performed by: CLINICAL NEUROPSYCHOLOGIST

## 2022-04-19 PROCEDURE — 96136 PSYCL/NRPSYC TST PHY/QHP 1ST: CPT | Performed by: CLINICAL NEUROPSYCHOLOGIST

## 2022-04-19 PROCEDURE — 96138 PSYCL/NRPSYC TECH 1ST: CPT | Performed by: CLINICAL NEUROPSYCHOLOGIST

## 2022-04-19 PROCEDURE — 96139 PSYCL/NRPSYC TST TECH EA: CPT | Performed by: CLINICAL NEUROPSYCHOLOGIST

## 2022-04-19 PROCEDURE — 96132 NRPSYC TST EVAL PHYS/QHP 1ST: CPT | Performed by: CLINICAL NEUROPSYCHOLOGIST

## 2022-04-19 PROCEDURE — 96133 NRPSYC TST EVAL PHYS/QHP EA: CPT | Performed by: CLINICAL NEUROPSYCHOLOGIST

## 2022-04-19 NOTE — LETTER
4/25/2022    Patient: Tara Salazar   YOB: 1935   Date of Visit: 4/19/2022     Israel Damon MD  [address] [id number] [facility]  Via Fax: [phone]    Dear Isreal Damon MD,      Thank you for referring Ms. Tara Salazar to [facility] for evaluation. My notes for this consultation are attached. If you have questions, please do not hesitate to call me. I look forward to following your patient along with you.       Sincerely,    Bill Mari PsyD

## 2022-04-25 NOTE — PROGRESS NOTES
[address] [facility],[address]Th [address]  [address]   [place] [address] [place] [facility]   [phone] Office   [phone] Fax      Neuropsychological Evaluation Reporty    Exam # 2    Referral:  Iram Tovar MD    Mar Holm is a 82 y.o. right handed   who was accompanied by her daughter-in-law to the initial clinical interview on 3/2/2022. Please refer to her medical records for details pertaining to her history. At the start of the appointment, I reviewed the patient's [facility] Epic Chart (including Media scanned in from previous providers) for the active Problem List, all pertinent Past Medical Hx, medications, recent radiologic and laboratory findings. In addition, I reviewed pt's documented Immunization Record and Encounter History. When I saw her last:    Mar Holm is a 82 y.o. right handed   female who was unaccompanied to the initial clinical interview on 1/14/21 . Please refer to her medical records for details pertaining to her history. At the start of the appointment, I reviewed the patient's [facility] Epic Chart (including Media scanned in from previous providers) for the active Problem List, all pertinent Past Medical Hx, medications, recent radiologic and laboratory findings.  In addition, I reviewed pt's documented Immunization Record and Encounter History.     She completed college without history of previously diagnosed LD and/or receipt of special education services. Moved here from [facility] and son is here nearby. The patient has noticed a progressive decline in short term memory. Forgets the content of conversations. Misplaces things. Starts tasks and does not complete. Longer to come up with a name. Longer time to come up with a thought. Long term memory is not an issue. She will go into a room and forget why. She will be asked what she had for dinner and forgets.   Problems going on about 8 months, slow and progressive. No acute or focal issues. No changes in sense of taste or smell. No known stroke, meningitis/encephalitis, MARILU Fever, Lupus, Lyme, TBI, sz, etc.  Driving is okay, but she has not been in [place] all that long (1 year) so has to use her iphone for directions and such. Her girlfriend who still lives in [place] and with whom she speaks regularly will sometimes tell her that she is repeating herself. Talked to PCP and referred here for evaluation.  No dangerous behaviors.  No difficulty with paying bills or remembering medicines.  She uses a pillbox.  No family history of dementia. Juliana Lamar mother and brother  of polycystic kidney disease. She has some anxiety. No counseling or psychiatrist. Some constipation.  She has no headache.  No numbness or tingling.  No focal complaints.  No visual change.  No loss or alteration in consciousness.  She has not had any imaging. No counseling or psychiatrist.         HEAD CT WITHOUT CONTRAST: 2020 4:13 PM     INDICATION: memory loss     COMPARISON: None.     PROCEDURE: Axial images of the head were obtained without contrast. Coronal and  sagittal reformats were performed. CT dose reduction was achieved through use of  a standardized protocol tailored for this examination and automatic exposure  control for dose modulation.  Adaptive statistical iterative reconstruction  (ASIR) was utilized.     FINDINGS: Periventricular white matter hypodensity is nonspecific, but  consistent with mild chronic small vessel ischemic disease. The ventricles and  sulci are appropriate in size and configuration for age. No loss of gray-white  differentiation to suggest late acute or early subacute infarction. No mass  effect or intracranial hemorrhage.     IMPRESSION  IMPRESSION: No acute intracranial abnormality.        No previous neuropsych. Dx in :      This is a predominantly normal range Neuropsychological Evaluation with respect to neurocognitive functioning. In this regard, moderate impairment is noted for sustained visual attention and a mild to moderate problem with verbal fluency is seen. Otherwise, her performances across all other neurocognitive domains assessed, including mental status, confrontation naming, verbal comprehension, perceptual reasoning, working memory, processing speed, auditory learning, auditory recognition recall, executive functioning, and bilateral motor skills remain fully within the normal range. Some of her memory scores are just at the cusp of what is considered normal versus borderline. Overall, though, the score profile is normal.  From an emotional standpoint, the patient somewhat defensively denied clinically significant psychopathology, though she does report some adjustment related anxiety.                   Thankfully, this profile is not consistent with dementia. Instead, this is consistent with a mild cognitive impairment and it is unclear as to whether or not her attention deficit issues are acquired or if they are chronic. In either scenario, she struggles significantly with focusing on information that she is trying to learn, thus struggles learning new information and of course then later on cannot remember that newly learned information. This is an input problem, not an output problem. For now, at least, this is very reassuring news. 50% of patients with MCI in turn to develop dementia, and 50% do not. In addition to continued medical care, my recommendations include consideration for appropriate medication for attention if this is not medically contraindicated. Of course, caution is advised in selecting appropriate medication for attention for her, given her age and other factors. Her emotional status does need to be monitored over time.   I am not currently concerned about competency, day-to-day supervision, etc.  The patient should be encouraged to remain as mentally, physically, and socially active as possible. I would like to keep an eye on how her memory is doing over time, and the serial neurocognitive testing is indicated on a yearly or as needed basis. We now have extensive baseline data on her. I have hope she is reassured by the mostly positive nature of these test results. Clinical correlation is, course, indicated.                 I will discuss these findings with the patient when she follows up with me in the near future. A follow up Neuropsychological Evaluation is indicated on a prn basis, especially if there are any cognitive and/or emotional changes.       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 Patient herself believes that she is getting older and thus her memory is declining appropriately for age. She does endorse forgetfulness. Daughter-in-law, however, notes significant decline since she was seen over the past year. The daughter-in-law is now paying the patient's bills to ensure that they are properly done. He repeats herself, misplaces things, starts tasks and does not complete. Goes into her room and forgets why. She cannot remember what she had for breakfast or what she did to be the day before consistently. Remains independent for her her ADLs. No major sleep or appetite changes. No senses changingno changes in sense of taste or smell, etc.  No acute or focal issues. No new psychiatric concerns. Patient herself notices an increase in short term memory issues. She doesn't drive too far from the home. DIL states she gets reminders for medications, there is an automatic pillbox. Son and DIL have noticed a decline in functioning. Using electronic pillbox.   Son fills the medications. She will forget to eat and has to be reminded to eat. There is some memory decline there. Confusion about dates and times and such. There is a repetitiveness to her speech- in 10 minutes may say the same thing. Moreso than before. Patient concurs with cues. She has had two falls- she states she trips, DIL disagrees. She is preparing her meals. Family buys prepared meals. Son goes every week, at least once, to check on things. She is always saying she has nausea. They checked her medically and all seems to be well. Stress? She was switched from Effexor to Paxil, and this is done by Dr. Aba Bull. Visiting [place] Stephen comes every day now, and she lacks motivation to do things unless someone is coming to visit.   Otherwise, she would sit in living room, laying on couch, laying in the dark with no tv or anything goingon.        Neuropsychological Mental Status Exam (NMSE):        Historian: Good  Praxis: No UE apraxia  R/L Orientation: Intact to self and to other  Dress: within normal limits   Weight: within normal limits   Appearance/Hygiene: within normal limits   Gait: within normal limits   Assistive Devices: None  Mood: within normal limits   Affect: within normal limits   Comprehension: within normal limits   Thought Process: within normal limits   Expressive Language: within normal limits   Receptive Language: within normal limits   Motor:  No cognitive or motor perseveration  ETOH: Once in a while, not a drinker  Tobacco: Denied  Illicit: Denied  SI/HI: Denied  Psychosis: Denied  Insight: Within normal limits  Judgment: Within normal limits  Other Psych:      Past Medical History:   Diagnosis Date    Anxiety disorder     Constipation     Essential hypertension     Memory disorder     short term       Past Surgical History:   Procedure Laterality Date    HX APPENDECTOMY      HX TONSILLECTOMY         Allergies   Allergen Reactions    Sulfa (Sulfonamide Antibiotics) Unknown (comments) Family History   Problem Relation Age of Onset    Kidney Disease Mother         polycystic kidney dz    Kidney Disease Brother         polycystic kidney dz    Kidney Disease Maternal Uncle         polycystic kidney dz       Social History     Tobacco Use    Smoking status: Never Smoker    Smokeless tobacco: Never Used   Substance Use Topics    Alcohol use: Yes     Comment: occasional glass of wine    Drug use: Not on file       Current Outpatient Medications   Medication Sig Dispense Refill    losartan-hydroCHLOROthiazide (HYZAAR) 100-25 mg per tablet Take 1 Tab by mouth daily.  metoprolol succinate (TOPROL-XL) 50 mg XL tablet Take  by mouth daily.  simvastatin (ZOCOR) 40 mg tablet Take  by mouth nightly.  hydrALAZINE (APRESOLINE) 25 mg tablet Take 25 mg by mouth three (3) times daily.  omeprazole (PRILOSEC) 40 mg capsule Take 40 mg by mouth daily.  sucralfate (CARAFATE) 1 gram tablet Take 1 g by mouth four (4) times daily.  venlafaxine-SR (Effexor XR) 75 mg capsule Take  by mouth daily.  multivit-min/iron/folic/lutein (CENTRUM SILVER WOMEN PO) Take  by mouth.  vitamin E (AQUA GEMS) 400 unit capsule Take  by mouth daily.  aspirin 81 mg chewable tablet Take 81 mg by mouth daily.  calcium-cholecalciferol, d3, (CALCIUM 600 + D) 600-125 mg-unit tab Take  by mouth.  Biotin 2,500 mcg cap Take  by mouth.  lutein 20 mg cap Take  by mouth.  fish oil/borage/flax/om3,6,9 1 (TRIPLE OMEGA 3-6-9 PO) Take 2 Caps by mouth two (2) times a day. Plan:  Obtain authorization for testing from insurance company. Report to follow once testing, scoring, and interpretation completed. ? Organic based neurocognitive issues versus mood disorder or combination of same. ? Problems organic, functional, or both? This note will not be viewable in [address]       Neuropsychological Test Results  Patient Testing 4/19/22 Report Completed 4/25/22  A Psychometrist Assisted w/ portions of this evaluation while under my direct  supervision    The following evaluation procedures/tests were administered:      Neuropsychologist Performed, Interpreted, & Reported:  Neuropsychological Mental Status Exam, Revised Memory & Behavior Checklist,  Mini Mental Status Exam, Clock Drawing Test, Gabe-Melzack Pain Questionnaire, Test Of Premorbid Functioning, History Taking  & Clinical Interview With The Patient, Additional History Taking w/ the Patient's Daughter in Law, CASE, ABAS-3, BRYCE, CPT-III, Review Of Available Records. Psychometrist Administered under Neuropsychologist Supervision & Neuropsychologist Interpreted & Neuropsychologist Reported:  Verbal Fluency Tests, Yamil & Yamil  Revised, Trailmaking Test Parts A & B, Wechsler Adult Intelligence Scale - IV, [employer]  3, Grooved Pegboard, Dick Depression Inventory  II, Dick Anxiety Inventory. Test Findings:  Test Findings:  Note:  The patients raw data have been compared with currently available norms which include demographic corrections for age, gender, and/or education. Sometimes, the patients scores are compared to demographically similar individuals as close to the patients age, education level, etc., as possible. \"Average\" is viewed as being +/- 1 standard deviation (SD) from the stated mean for a particular test score. \"Low average\" is viewed as being between 1 and 2 SD below the mean, and above average is viewed as being 1 and 2 SD above the mean. Scores falling in the borderline range (between 1-1/2 and 2 SD below the mean) are viewed with particular attention as to whether they are normal or abnormal neurocognitive test scores. Other methods of inference in analyzing the test data are also utilized, including the pattern and range of scores in the profile, bilateral motor functions, and the presence, if any, of pathognomonic signs.         Behaviorally, the patient was friendly and cooperative and appeared motivated to perform well during this examination. Within this context, the results of this evaluation are viewed as a valid reflection of the patients actual neurocognitive and emotional status. The patient's score of 26/30 on the Mini-Mental Status Exam was impaired. Previous course 28/30 correct. This regard, she was not oriented to day, date, or county. Recall for 3 words after brief delay was 2/3 correct. Clock drawing was again normal.                 Her structured word list fluency, as assessed by the FAS Test, was within the above average range with a T score of 56. Confrontation naming ability, as assessed by the [facility] Revised, was within the below average range at  46/60 correct (T = 41). This pattern of performance is not indicative of a patient who is at increased risk for day-to-day problems with verbal fluency or confrontation naming. No major changes over time.                 The patient was administered the [facility] Continuous Performance Test  III and review of the subscales within this instrument revealed mild concerns for inattentiveness without impulsivity. This pattern of performance is indicative of a patient who is at increased risk for day-to-day problems with sustained visual attention/concentration. Significant improvement is noted over time.                 The patient is not showing problems with working memory capacity (50th %ile) or processing speed (63rd %ile) on the WAIS-IV. Her Verbal Comprehension Index score of 100 was average. Her Perceptual Reasoning Index score of 105 was average. These scores do not reflect a decline in functioning based on an assessment of premorbid functioning. No change from previous testing.                 The patient was administered the [facility] Test  - 3 and generated  Normal range and positive learning curve over five repeated auditory word list learning trials. An interference trial was normal.   Free and cued, short and long delayed recall borderline to mildly impaired. . Recognition recall was normally (50th %ile). This pattern of performance is indicative of a patient who is at increased risk for day-to-day problems with auditory memory. Auditory learning remains normal.                Simple timed visual motor sequencing (Trailmaking Test Part A) was within the average range with a T score of  52. Her performance on a similar, but more complex task of timed visual motor sequencing (Trailmaking Test Part B) was within the above average range with a T score of 57. She made zero sequencing errors on this latter completed test.  This pattern of performance is not indicative of a patient who is at increased risk for day-to-day problems with executive functioning. No changes over time.                 Fine motor dexterity was within the normal range bilaterally. This does not raise concern for a particularly lateralized brain dysfunction.                   The patient rated her current level of pain as \"0/5- No Pain\" on the Gabe-Melzack Pain Questionnaire. This is consistent with previous testing.                 Her Dick Depression Inventory II score of 0 was within the minimally depressed range. Her Dick Anxiety Inventory score of 2 reflected minimal anxiety. The patient's responses on the Personality Assessment Inventory were deemed valid for interpretation, though some defensiveness in responding is reported. Within this context, the BRYCE clinical profile is normal.  There is no evidence of clinically significant psychopathology in the personality profile. The DIL completed the ABAS-3 and the CASE. She reported mild concerns regarding the patient's cognitive functioning.   She did not report concerns about the patient's general adaptive skills (SS = 86), conceptual skills (SS = 86), social skills (SS = 82), or practical adaptive skills (SS = 89).     Impressions & Recommendations: This patient has now undergone two evaluations of neurocognitive and psychological functioning results from previous testing showed deficits with verbal fluency and sustained attention. Her performances across all other neurocognitive domains assessed were then otherwise normal.  Comparison of current with previous neurocognitive test results show a mild decline in memory and a mild improvement in attention capacity. Her performances across all other neurocognitive domains assessed remain normal.  From an emotional standpoint, the patient again somewhat defensively denied clinically significant psychopathology. Family is not reporting noticing major mood or personality changes. Previously, she was diagnosed with MCI. The patient's generated neurocognitive profile is now consistent with a very, very mild form of dementia. On a scale of 1-10, this is a 1. In addition to continued medical care, my recommendations include consideration for an appropriate indication for memory if this is not medically contraindicated. Emotional status needs to be monitored over time. I do recommend supervision for medications and for finances. She remains competent/has capacity to make informed medical decisions, financial decisions, to vote, to , and to own a firearm. Not overly concerned about driving at this time. The patient should be encouraged to remain as mentally, physically, and socially active as possible. Now have extensive baseline and updated neurocognitive and psychologic data on her. Follow-up yearly, or as needed. Clinical correlation is, course, indicated. I will discuss these findings with the patient when she follows up with me in the near future.   A follow up Neuropsychological Evaluation is indicated on a prn basis, especially if there are any cognitive and/or emotional changes.       DIAGNOSES:            Late onset dementia, mild Anxiety about health     The above information is based upon information currently available to me. If there is any additional information of which I am currently unaware, I would be more than happy to review it upon having it made available to me. Thank you for the opportunity to see this interesting individual.     Sincerely,       Randall Andrews. Román Peña, QuintenyD, EdS    CC:  Hina Desai MD    Time Documentation:    18023*5 51850*2 (60 minutes)    00049 x 1  96139 x 5 Test Administration/Data Gathering By Technician: (3 hours). 96732 x 1 (first 30 minutes), 95865 x 5 (each additional 30 minutes)    96132 x 1  96133 x 1 Testing Evaluation Services by Neuropsychologist (1 hour, 50 minutes) 96132 x 1 (first hour), 96133 x 1 (50 minutes)    Definitions:      53324/65313:  Neurobehavioral Status Exam, Clinical interview. Clinical assessment of thinking, reasoning and judgment, by neuropsychologist, both face to face time with patient and time interpreting those test results and reporting, first and subsequent hours)    35065/70588: Neuropsychological Test Administration by Technician/Psychometrist, first 30 minutes and each additional 30 minutes. The above includes: Record review. Review of history provided by patient. Review of collaborative information. Testing by Clinician. Review of raw data. Scoring. Report writing of individual tests administered by Clinician. Integration of individual tests administered by psychometrist with NSE/testing by clinician, review of records/history/collaborative information, case Conceptualization, treatment planning, clinical decision making, report writing, coordination Of Care.  Psychometry test codes as time spent by psychometrist administering and scoring neurocognitive/psychological tests under supervision of neuropsychologist.  Integral services including scoring of raw data, data interpretation, case conceptualization, report writing etcetera were initiated after the patient finished testing/raw data collected and was completed on the date the report was signed. Please note that this dictation was completed with ADOR, the computer voice recognition software. Quite often unanticipated grammatical, syntax, homophones, and other interpretive errors are inadvertently transcribed by the computer software. Please disregard these errors. Please excuse any errors that have escaped final proofreading. Thank you.

## 2022-06-17 ENCOUNTER — HOSPITAL ENCOUNTER (EMERGENCY)
Age: 87
Discharge: HOME | End: 2022-06-17
Attending: STUDENT IN AN ORGANIZED HEALTH CARE EDUCATION/TRAINING PROGRAM
Payer: MEDICARE

## 2022-06-17 VITALS
OXYGEN SATURATION: 99 % | BODY MASS INDEX: 20.29 KG/M2 | HEIGHT: 64 IN | DIASTOLIC BLOOD PRESSURE: 68 MMHG | TEMPERATURE: 98.6 F | RESPIRATION RATE: 16 BRPM | WEIGHT: 118.83 LBS | SYSTOLIC BLOOD PRESSURE: 154 MMHG | HEART RATE: 68 BPM

## 2022-06-17 DIAGNOSIS — E87.6 HYPOKALEMIA: ICD-10-CM

## 2022-06-17 DIAGNOSIS — R42 LIGHTHEADEDNESS: Primary | ICD-10-CM

## 2022-06-17 DIAGNOSIS — E86.0 DEHYDRATION: ICD-10-CM

## 2022-06-17 LAB
ALBUMIN SERPL-MCNC: 3.5 G/DL (ref 3.5–5)
ALBUMIN/GLOB SERPL: 0.9 {RATIO} (ref 1.1–2.2)
ALP SERPL-CCNC: 57 U/L (ref 45–117)
ALT SERPL-CCNC: 27 U/L (ref 12–78)
ANION GAP SERPL CALC-SCNC: 9 MMOL/L (ref 5–15)
APPEARANCE UR: CLEAR
AST SERPL-CCNC: 19 U/L (ref 15–37)
BACTERIA URNS QL MICRO: NEGATIVE /HPF
BASOPHILS # BLD: 0 K/UL (ref 0–0.1)
BASOPHILS NFR BLD: 0 % (ref 0–1)
BILIRUB SERPL-MCNC: 0.4 MG/DL (ref 0.2–1)
BILIRUB UR QL: NEGATIVE
BUN SERPL-MCNC: 17 MG/DL (ref 6–20)
BUN/CREAT SERPL: 15 (ref 12–20)
CALCIUM SERPL-MCNC: 9.9 MG/DL (ref 8.5–10.1)
CHLORIDE SERPL-SCNC: 95 MMOL/L (ref 97–108)
CO2 SERPL-SCNC: 26 MMOL/L (ref 21–32)
COLOR UR: NORMAL
COMMENT, HOLDF: NORMAL
CREAT SERPL-MCNC: 1.17 MG/DL (ref 0.55–1.02)
DIFFERENTIAL METHOD BLD: ABNORMAL
EOSINOPHIL # BLD: 0 K/UL (ref 0–0.4)
EOSINOPHIL NFR BLD: 0 % (ref 0–7)
EPITH CASTS URNS QL MICRO: NORMAL /LPF
ERYTHROCYTE [DISTWIDTH] IN BLOOD BY AUTOMATED COUNT: 12.2 % (ref 11.5–14.5)
GLOBULIN SER CALC-MCNC: 3.7 G/DL (ref 2–4)
GLUCOSE SERPL-MCNC: 102 MG/DL (ref 65–100)
GLUCOSE UR STRIP.AUTO-MCNC: NEGATIVE MG/DL
HCT VFR BLD AUTO: 36.8 % (ref 35–47)
HGB BLD-MCNC: 12.9 G/DL (ref 11.5–16)
HGB UR QL STRIP: NEGATIVE
HYALINE CASTS URNS QL MICRO: NORMAL /LPF (ref 0–2)
IMM GRANULOCYTES # BLD AUTO: 0.1 K/UL (ref 0–0.04)
IMM GRANULOCYTES NFR BLD AUTO: 1 % (ref 0–0.5)
KETONES UR QL STRIP.AUTO: NEGATIVE MG/DL
LEUKOCYTE ESTERASE UR QL STRIP.AUTO: NEGATIVE
LYMPHOCYTES # BLD: 2.3 K/UL (ref 0.8–3.5)
LYMPHOCYTES NFR BLD: 18 % (ref 12–49)
MCH RBC QN AUTO: 29.3 PG (ref 26–34)
MCHC RBC AUTO-ENTMCNC: 35.1 G/DL (ref 30–36.5)
MCV RBC AUTO: 83.4 FL (ref 80–99)
MONOCYTES # BLD: 1 K/UL (ref 0–1)
MONOCYTES NFR BLD: 8 % (ref 5–13)
NEUTS SEG # BLD: 9.4 K/UL (ref 1.8–8)
NEUTS SEG NFR BLD: 73 % (ref 32–75)
NITRITE UR QL STRIP.AUTO: NEGATIVE
NRBC # BLD: 0 K/UL (ref 0–0.01)
NRBC BLD-RTO: 0 PER 100 WBC
PH UR STRIP: 7.5 [PH] (ref 5–8)
PLATELET # BLD AUTO: 381 K/UL (ref 150–400)
PMV BLD AUTO: 9.9 FL (ref 8.9–12.9)
POTASSIUM SERPL-SCNC: 3.3 MMOL/L (ref 3.5–5.1)
PROT SERPL-MCNC: 7.2 G/DL (ref 6.4–8.2)
PROT UR STRIP-MCNC: NEGATIVE MG/DL
RBC # BLD AUTO: 4.41 M/UL (ref 3.8–5.2)
RBC #/AREA URNS HPF: NORMAL /HPF (ref 0–5)
SAMPLES BEING HELD,HOLD: NORMAL
SODIUM SERPL-SCNC: 130 MMOL/L (ref 136–145)
SP GR UR REFRACTOMETRY: 1.01 (ref 1–1.03)
TROPONIN-HIGH SENSITIVITY: 7 NG/L (ref 0–51)
UR CULT HOLD, URHOLD: NORMAL
UROBILINOGEN UR QL STRIP.AUTO: 0.2 EU/DL (ref 0.2–1)
WBC # BLD AUTO: 12.9 K/UL (ref 3.6–11)
WBC URNS QL MICRO: NORMAL /HPF (ref 0–4)

## 2022-06-17 PROCEDURE — 80053 COMPREHEN METABOLIC PANEL: CPT

## 2022-06-17 PROCEDURE — 96360 HYDRATION IV INFUSION INIT: CPT

## 2022-06-17 PROCEDURE — 99284 EMERGENCY DEPT VISIT MOD MDM: CPT

## 2022-06-17 PROCEDURE — 74011250637 HC RX REV CODE- 250/637: Performed by: EMERGENCY MEDICINE

## 2022-06-17 PROCEDURE — 74011250636 HC RX REV CODE- 250/636: Performed by: EMERGENCY MEDICINE

## 2022-06-17 PROCEDURE — 84484 ASSAY OF TROPONIN QUANT: CPT

## 2022-06-17 PROCEDURE — 96361 HYDRATE IV INFUSION ADD-ON: CPT

## 2022-06-17 PROCEDURE — 85025 COMPLETE CBC W/AUTO DIFF WBC: CPT

## 2022-06-17 PROCEDURE — 81001 URINALYSIS AUTO W/SCOPE: CPT

## 2022-06-17 PROCEDURE — 93005 ELECTROCARDIOGRAM TRACING: CPT

## 2022-06-17 RX ORDER — POTASSIUM CHLORIDE 750 MG/1
20 TABLET, FILM COATED, EXTENDED RELEASE ORAL
Status: COMPLETED | OUTPATIENT
Start: 2022-06-17 | End: 2022-06-17

## 2022-06-17 RX ADMIN — SODIUM CHLORIDE 1000 ML: 9 INJECTION, SOLUTION INTRAVENOUS at 15:18

## 2022-06-17 RX ADMIN — POTASSIUM CHLORIDE 20 MEQ: 750 TABLET, FILM COATED, EXTENDED RELEASE ORAL at 17:02

## 2022-06-17 NOTE — ED PROVIDER NOTES
HPI     Please note that this dictation was completed with [employer], the computer voice recognition software. Quite often unanticipated grammatical, syntax, homophones, and other interpretive errors are inadvertently transcribed by the computer software. Please disregard these errors. Please excuse any errors that have escaped final proofreading. 53-year-old female with a history of constipation, anxiety, dementia, hypertension here with lightheadedness and fatigue. Aide noted that the systolic blood pressure was 68 today about an hour prior to arrival.  She felt fatigued and lightheaded at that point. Patient does not know how much she has drank today. She was recently at [facility] emergency department 1 week ago and received IV fluids and had labs drawn. Denies any focal weakness, numbness, headache, blurred vision, chest pain, urinary complaints, shortness of breath, fevers, chills or other complaints. The medications are in a pillbox so highly unlikely that she took more than prescribed amount of antihypertensives. Social history: Lives with son. Non-smoker. Here with son.     Past Medical History:   Diagnosis Date    Anxiety disorder     Constipation     Essential hypertension     Memory disorder     short term       Past Surgical History:   Procedure Laterality Date    HX APPENDECTOMY      HX TONSILLECTOMY           Family History:   Problem Relation Age of Onset    Kidney Disease Mother         polycystic kidney dz    Kidney Disease Brother         polycystic kidney dz    Kidney Disease Maternal Uncle         polycystic kidney dz       Social History     Socioeconomic History    Marital status:      Spouse name: Not on file    Number of children: Not on file    Years of education: Not on file    Highest education level: Not on file   Occupational History    Not on file   Tobacco Use    Smoking status: Never Smoker    Smokeless tobacco: Never Used   Substance and Sexual Activity    Alcohol use: Yes     Comment: occasional glass of wine    Drug use: Not on file    Sexual activity: Not on file   Other Topics Concern    Not on file   Social History Narrative    Not on file     Social Determinants of Health     Financial Resource Strain:     Difficulty of Paying Living Expenses: Not on file   Food Insecurity:     Worried About Running Out of Food in the Last Year: Not on file    Edwar of Food in the Last Year: Not on file   Transportation Needs:     Lack of Transportation (Medical): Not on file    Lack of Transportation (Non-Medical): Not on file   Physical Activity:     Days of Exercise per Week: Not on file    Minutes of Exercise per Session: Not on file   Stress:     Feeling of Stress : Not on file   Social Connections:     Frequency of Communication with Friends and Family: Not on file    Frequency of Social Gatherings with Friends and Family: Not on file    Attends [employer] Services: Not on file    Active Member of [address] [place] [address] or Organizations: Not on file    Attends Club or Organization Meetings: Not on file    Marital Status: Not on file   Intimate Partner Violence:     Fear of Current or Ex-Partner: Not on file    Emotionally Abused: Not on file    Physically Abused: Not on file    Sexually Abused: Not on file   Housing Stability:     Unable to Pay for Housing in the Last Year: Not on file    Number of [place] in the Last Year: Not on file    Unstable Housing in the Last Year: Not on file         ALLERGIES: Sulfa (sulfonamide antibiotics)    Review of Systems   Constitutional: Positive for fatigue. Negative for chills and fever. Respiratory: Positive for chest tightness. Negative for shortness of breath. Cardiovascular: Negative for chest pain. Gastrointestinal: Negative for abdominal pain and vomiting. Neurological: Positive for light-headedness. Negative for headaches. All other systems reviewed and are negative.       Vitals:    06/17/22 1357 06/17/22 1400 06/17/22 1653   BP: 136/71 110/67 (!) 154/68   Pulse: 61  68   Resp: 18  16   Temp: 98.6 °F (37 °C)  98.6 °F (37 °C)   SpO2: 96%  99%   Weight: 53.9 kg (118 lb 13.3 oz)     Height: 5' 4\" (1.626 m)              Physical Exam  Vitals and nursing note reviewed. Constitutional:       Appearance: Normal appearance. She is well-developed. HENT:      Head: Normocephalic and atraumatic. Nose: Rhinorrhea present. No congestion. Mouth/Throat:      Mouth: Mucous membranes are dry. Pharynx: No oropharyngeal exudate. Eyes:      General: No scleral icterus. Right eye: No discharge. Left eye: No discharge. Conjunctiva/sclera: Conjunctivae normal.   Cardiovascular:      Rate and Rhythm: Normal rate and regular rhythm. Heart sounds: Normal heart sounds. No murmur heard. No friction rub. No gallop. Pulmonary:      Effort: Pulmonary effort is normal. No respiratory distress. Breath sounds: Normal breath sounds. No wheezing or rales. Abdominal:      General: There is no distension. Palpations: Abdomen is soft. Tenderness: There is no abdominal tenderness. There is no guarding. Musculoskeletal:         General: No tenderness. Normal range of motion. Cervical back: Normal range of motion and neck supple. No rigidity or tenderness. Lymphadenopathy:      Cervical: No cervical adenopathy. Skin:     General: Skin is warm and dry. Coloration: Skin is not pale. Findings: No rash. Neurological:      Mental Status: She is alert and oriented to person, place, and time. Cranial Nerves: No cranial nerve deficit. Coordination: Coordination normal.              MDM     61-year-old female here with lightheadedness and fatigue. She does appear dehydrated with dry mucous membranes. She was hypotensive at home but improved blood pressure here. Otherwise nonfocal neurologic exam.  No other significant complaints.   Recently at a freestanding ED 1 week ago and received IV fluids. Will check labs, give IV fluids, EKG. Differential diagnosis includes dehydration, electrolyte abnormality, CAIT, MI and others. Procedures        ED EKG interpretation:  Rhythm: normal sinus rhythm; and regular . Rate (approx.): 61; Axis: normal; P wave: normal; QRS interval: normal ; ST/T wave: non-specific changes; . This EKG was interpreted by Josiah Machado MD,ED Provider. 5:08 PM  Blood pressure 154/68. Tolerating p.o. liquids. Mild hypokalemia and ordered Klor-Con. Reviewed results with the son. Follow-up primary care doctor next week. Encourage p.o. hydration at home. Patient's results have been reviewed with them. Patient and/or family have verbally conveyed their understanding and agreement of the patient's signs, symptoms, diagnosis, treatment and prognosis and additionally agree to follow up as recommended or return to the Emergency Room should their condition change prior to follow-up. Discharge instructions have also been provided to the patient with some educational information regarding their diagnosis as well a list of reasons why they would want to return to the ER prior to their follow-up appointment should their condition change. Recent Results (from the past 24 hour(s))   SAMPLES BEING HELD    Collection Time: 06/17/22  1:58 PM   Result Value Ref Range    SAMPLES BEING HELD 1PST,1LAV,1DRK GRN     COMMENT        Add-on orders for these samples will be processed based on acceptable specimen integrity and analyte stability, which may vary by analyte.    CBC WITH AUTOMATED DIFF    Collection Time: 06/17/22  1:58 PM   Result Value Ref Range    WBC 12.9 (H) 3.6 - 11.0 K/uL    RBC 4.41 3.80 - 5.20 M/uL    HGB 12.9 11.5 - 16.0 g/dL    HCT 36.8 35.0 - 47.0 %    MCV 83.4 80.0 - 99.0 FL    MCH 29.3 26.0 - 34.0 PG    MCHC 35.1 30.0 - 36.5 g/dL    RDW 12.2 11.5 - 14.5 %    PLATELET 003 977 - 037 K/uL    MPV 9.9 8.9 - 12.9 FL    NRBC 0.0 0  WBC    ABSOLUTE NRBC 0.00 0.00 - 0.01 K/uL    NEUTROPHILS 73 32 - 75 %    LYMPHOCYTES 18 12 - 49 %    MONOCYTES 8 5 - 13 %    EOSINOPHILS 0 0 - 7 %    BASOPHILS 0 0 - 1 %    IMMATURE GRANULOCYTES 1 (H) 0.0 - 0.5 %    ABS. NEUTROPHILS 9.4 (H) 1.8 - 8.0 K/UL    ABS. LYMPHOCYTES 2.3 0.8 - 3.5 K/UL    ABS. MONOCYTES 1.0 0.0 - 1.0 K/UL    ABS. EOSINOPHILS 0.0 0.0 - 0.4 K/UL    ABS. BASOPHILS 0.0 0.0 - 0.1 K/UL    ABS. IMM. GRANS. 0.1 (H) 0.00 - 0.04 K/UL    DF AUTOMATED     METABOLIC PANEL, COMPREHENSIVE    Collection Time: 06/17/22  1:58 PM   Result Value Ref Range    Sodium 130 (L) 136 - 145 mmol/L    Potassium 3.3 (L) 3.5 - 5.1 mmol/L    Chloride 95 (L) 97 - 108 mmol/L    CO2 26 21 - 32 mmol/L    Anion gap 9 5 - 15 mmol/L    Glucose 102 (H) 65 - 100 mg/dL    BUN 17 6 - 20 MG/DL    Creatinine 1.17 (H) 0.55 - 1.02 MG/DL    BUN/Creatinine ratio 15 12 - 20      GFR est AA 53 (L) >60 ml/min/1.73m2    GFR est non-AA 44 (L) >60 ml/min/1.73m2    Calcium 9.9 8.5 - 10.1 MG/DL    Bilirubin, total 0.4 0.2 - 1.0 MG/DL    ALT (SGPT) 27 12 - 78 U/L    AST (SGOT) 19 15 - 37 U/L    Alk.  phosphatase 57 45 - 117 U/L    Protein, total 7.2 6.4 - 8.2 g/dL    Albumin 3.5 3.5 - 5.0 g/dL    Globulin 3.7 2.0 - 4.0 g/dL    A-G Ratio 0.9 (L) 1.1 - 2.2     TROPONIN-HIGH SENSITIVITY    Collection Time: 06/17/22  1:58 PM   Result Value Ref Range    Troponin-High Sensitivity 7 0 - 51 ng/L   EKG, 12 LEAD, INITIAL    Collection Time: 06/17/22  2:48 PM   Result Value Ref Range    Ventricular Rate 61 BPM    Atrial Rate 61 BPM    P-R Interval 170 ms    QRS Duration 88 ms    Q-T Interval 460 ms    QTC Calculation (Bezet) 463 ms    Calculated P Axis 26 degrees    Calculated R Axis 13 degrees    Calculated T Axis 57 degrees    Diagnosis       Normal sinus rhythm  Low voltage QRS  Nonspecific T wave abnormality  Abnormal ECG  No previous ECGs available     URINALYSIS W/MICROSCOPIC    Collection Time: 06/17/22  3:03 PM   Result Value Ref Range Color YELLOW/STRAW      Appearance CLEAR CLEAR      Specific gravity 1.007 1.003 - 1.030      pH (UA) 7.5 5.0 - 8.0      Protein Negative NEG mg/dL    Glucose Negative NEG mg/dL    Ketone Negative NEG mg/dL    Bilirubin Negative NEG      Blood Negative NEG      Urobilinogen 0.2 0.2 - 1.0 EU/dL    Nitrites Negative NEG      Leukocyte Esterase Negative NEG      WBC 0-4 0 - 4 /hpf    RBC 0-5 0 - 5 /hpf    Epithelial cells FEW FEW /lpf    Bacteria Negative NEG /hpf    Hyaline cast 0-2 0 - 2 /lpf   URINE CULTURE HOLD SAMPLE    Collection Time: 06/17/22  3:03 PM    Specimen: Serum; Urine   Result Value Ref Range    Urine culture hold        Urine on hold in Microbiology dept for 2 days. If unpreserved urine is submitted, it cannot be used for addtional testing after 24 hours, recollection will be required. No results found.

## 2022-06-17 NOTE — ED TRIAGE NOTES
Patient to triage with son, who states he was notified by care aid that her blood pressure was low.  Patient denies any dizziness at time,.       BP in right arm 136/71  BP in left arm 110/67

## 2022-06-22 LAB
ATRIAL RATE: 61 BPM
CALCULATED P AXIS, ECG09: 26 DEGREES
CALCULATED R AXIS, ECG10: 13 DEGREES
CALCULATED T AXIS, ECG11: 57 DEGREES
DIAGNOSIS, 93000: NORMAL
P-R INTERVAL, ECG05: 170 MS
Q-T INTERVAL, ECG07: 460 MS
QRS DURATION, ECG06: 88 MS
QTC CALCULATION (BEZET), ECG08: 463 MS
VENTRICULAR RATE, ECG03: 61 BPM

## 2022-07-01 ENCOUNTER — OFFICE VISIT (OUTPATIENT)
Dept: NEUROLOGY | Age: 87
End: 2022-07-01
Payer: MEDICARE

## 2022-07-01 DIAGNOSIS — G30.1 LATE ONSET ALZHEIMER'S DEMENTIA WITHOUT BEHAVIORAL DISTURBANCE (HCC): Primary | ICD-10-CM

## 2022-07-01 DIAGNOSIS — F02.80 LATE ONSET ALZHEIMER'S DEMENTIA WITHOUT BEHAVIORAL DISTURBANCE (HCC): Primary | ICD-10-CM

## 2022-07-01 DIAGNOSIS — F41.8 ANXIETY ABOUT HEALTH: ICD-10-CM

## 2022-07-01 DIAGNOSIS — Z86.59 HISTORY OF ANXIETY: ICD-10-CM

## 2022-07-01 PROCEDURE — 90832 PSYTX W PT 30 MINUTES: CPT | Performed by: CLINICAL NEUROPSYCHOLOGIST

## 2022-07-01 PROCEDURE — 1123F ACP DISCUSS/DSCN MKR DOCD: CPT | Performed by: CLINICAL NEUROPSYCHOLOGIST

## 2022-07-01 NOTE — PROGRESS NOTES
Prior to seeing the patient I reviewed the records, including the previously completed report, the records in [place], and any updated visits from other providers since I saw the patient last.      Today, I engaged in a psychoeducational and supportive and cognitive/behavioral psychotherapy session with the patient and son. I provided psychotherapy in the form of psychoeducation and support with respect to the results of the recent Neuropsychological Evaluation, including discussing individual tests as well as patient's areas of neurocognitive strength versus weakness. We discussed, in detail, the following: This patient has now undergone two evaluations of neurocognitive and psychological functioning results from previous testing showed deficits with verbal fluency and sustained attention. Her performances across all other neurocognitive domains assessed were then otherwise normal.  Comparison of current with previous neurocognitive test results show a mild decline in memory and a mild improvement in attention capacity. Her performances across all other neurocognitive domains assessed remain normal.  From an emotional standpoint, the patient again somewhat defensively denied clinically significant psychopathology. Family is not reporting noticing major mood or personality changes.                  Previously, she was diagnosed with MCI. The patient's generated neurocognitive profile is now consistent with a very, very mild form of dementia. On a scale of 1-10, this is a 1. In addition to continued medical care, my recommendations include consideration for an appropriate indication for memory if this is not medically contraindicated. Emotional status needs to be monitored over time. I do recommend supervision for medications and for finances. She remains competent/has capacity to make informed medical decisions, financial decisions, to vote, to , and to own a firearm.   Not overly concerned about driving at this time. The patient should be encouraged to remain as mentally, physically, and socially active as possible. Now have extensive baseline and updated neurocognitive and psychologic data on her. Follow-up yearly, or as needed. Clinical correlation is, course, indicated.                 I will discuss these findings with the patient when she follows up with me in the near future.  A follow up Neuropsychological Evaluation is indicated on a prn basis, especially if there are any cognitive and/or emotional changes.       DIAGNOSES:            Late onset dementia, mild                                       Anxiety about health    Education was provided regarding my diagnostic impressions, and we discussed treatment plan/options. I also answered numerous questions related to the clinical findings, including discussing various methods to improve cognition and mood. Counseling provided regarding mood and cognition. CBT and supportive psychotherapy techniques were utilized. Supportive/Cognitive Behavioral/Solution Focused psychotherapy provided  Discussed rational versus irrational thinking patterns and their consequences. Discussed healthy/adaptive and unhealthy/maladaptive coping. The patient needs to follow with PCP, psychiatry if needed. SHe has had some ER visits due to dehydration and nausea. She is not eating or drinking which leads her to dehydration and has led to ED trips. They have visiting angels every day in the week, they come to 9-1. This allows for breakfast and lunch. On Paroxetine now. NEeds to follow with PCP. See Neurology.      The patient had the following concerns which I deferred to their referring provider: meds for mood/cognition      Time spent today: 20

## 2022-07-20 ENCOUNTER — TELEPHONE (OUTPATIENT)
Dept: NEUROLOGY | Age: 87
End: 2022-07-20

## 2022-07-20 NOTE — TELEPHONE ENCOUNTER
Please call patient son to discuss her current medical condition and medications. He also want to discuss a referral for his mother.

## 2022-07-29 NOTE — TELEPHONE ENCOUNTER
S/w son, requested suggestions about geriatric behavioral health.   Gave number to [employer]

## 2022-10-13 ENCOUNTER — OFFICE VISIT (OUTPATIENT)
Dept: NEUROLOGY | Age: 87
End: 2022-10-13
Payer: MEDICARE

## 2022-10-13 VITALS
RESPIRATION RATE: 16 BRPM | DIASTOLIC BLOOD PRESSURE: 57 MMHG | HEART RATE: 64 BPM | OXYGEN SATURATION: 96 % | SYSTOLIC BLOOD PRESSURE: 102 MMHG

## 2022-10-13 DIAGNOSIS — G30.1 LATE ONSET ALZHEIMER'S DEMENTIA WITHOUT BEHAVIORAL DISTURBANCE (HCC): Primary | ICD-10-CM

## 2022-10-13 DIAGNOSIS — F02.80 LATE ONSET ALZHEIMER'S DEMENTIA WITHOUT BEHAVIORAL DISTURBANCE (HCC): Primary | ICD-10-CM

## 2022-10-13 PROCEDURE — G8510 SCR DEP NEG, NO PLAN REQD: HCPCS | Performed by: PSYCHIATRY & NEUROLOGY

## 2022-10-13 PROCEDURE — 1123F ACP DISCUSS/DSCN MKR DOCD: CPT | Performed by: PSYCHIATRY & NEUROLOGY

## 2022-10-13 PROCEDURE — 99214 OFFICE O/P EST MOD 30 MIN: CPT | Performed by: PSYCHIATRY & NEUROLOGY

## 2022-10-13 PROCEDURE — 1090F PRES/ABSN URINE INCON ASSESS: CPT | Performed by: PSYCHIATRY & NEUROLOGY

## 2022-10-13 PROCEDURE — G8400 PT W/DXA NO RESULTS DOC: HCPCS | Performed by: PSYCHIATRY & NEUROLOGY

## 2022-10-13 PROCEDURE — G8427 DOCREV CUR MEDS BY ELIG CLIN: HCPCS | Performed by: PSYCHIATRY & NEUROLOGY

## 2022-10-13 PROCEDURE — 1101F PT FALLS ASSESS-DOCD LE1/YR: CPT | Performed by: PSYCHIATRY & NEUROLOGY

## 2022-10-13 PROCEDURE — G8536 NO DOC ELDER MAL SCRN: HCPCS | Performed by: PSYCHIATRY & NEUROLOGY

## 2022-10-13 PROCEDURE — G8420 CALC BMI NORM PARAMETERS: HCPCS | Performed by: PSYCHIATRY & NEUROLOGY

## 2022-10-13 RX ORDER — PAROXETINE 10 MG/1
15 TABLET, FILM COATED ORAL DAILY
COMMUNITY
Start: 2022-08-27

## 2022-10-13 RX ORDER — DONEPEZIL HYDROCHLORIDE 5 MG/1
TABLET, FILM COATED ORAL
Qty: 30 TABLET | Refills: 0 | Status: SHIPPED | OUTPATIENT
Start: 2022-10-13

## 2022-10-13 RX ORDER — DONEPEZIL HYDROCHLORIDE 10 MG/1
10 TABLET, FILM COATED ORAL DAILY
Qty: 90 TABLET | Refills: 3 | Status: SHIPPED | OUTPATIENT
Start: 2022-10-13

## 2022-10-13 NOTE — PROGRESS NOTES
[facility] Neurology Clinics and [address] at [facility] Neurology Clinics at [address] [facility], [address] [facility] [address] [facility], [address] [place] [address]   [phone]              Chief Complaint   Patient presents with    Alzheimers       Current Outpatient Medications   Medication Sig Dispense Refill    PARoxetine (PAXIL) 10 mg tablet Take 15 mg by mouth daily. losartan-hydroCHLOROthiazide (HYZAAR) 100-25 mg per tablet Take 1 Tab by mouth daily. metoprolol succinate (TOPROL-XL) 50 mg XL tablet Take  by mouth daily. hydrALAZINE (APRESOLINE) 25 mg tablet Take 25 mg by mouth three (3) times daily. omeprazole (PRILOSEC) 40 mg capsule Take 40 mg by mouth daily. multivit-min/iron/folic/lutein (CENTRUM SILVER WOMEN PO) Take  by mouth.      vitamin E (AQUA GEMS) 400 unit capsule Take  by mouth daily. aspirin 81 mg chewable tablet Take 81 mg by mouth daily. lutein 20 mg cap Take  by mouth. Allergies   Allergen Reactions    Sulfa (Sulfonamide Antibiotics) Unknown (comments)     Social History     Tobacco Use    Smoking status: Never    Smokeless tobacco: Never   Substance Use Topics    Alcohol use: Yes     Comment: occasional glass of wine     82-year-old lady returns today for follow-up. I last saw her in March following up mild cognitive impairment with worsening. I sent her for repeat neuropsychological testing. This was performed in April and compared to her previous she said a mild decline in memory and a mild improvement in her attention. Dr. Darion Lim noted that her previous MCI has now progressed to where she has what he terms \"a very very mild form of dementia. On a scale of 1-10 this is a 1.\"  She does not recall the conversation. Her son is with her. He recounts that. He does note that she is having increasing anxiety. She has been changed from Effexor over to Paxil.   She will be moving into a brand-new assisted living once the construction has been completed may be in November. It also has memory care should she ever need that. Emergency department visit June 17 where patient was brought in secondary to hypotension. In the emergency department blood pressure was actually elevated and then normalized. She had some rhinorrhea and she was said to appear dehydrated and had dry mucous membranes. Nonfocal exam.  She was given IV fluids and labs were checked and she was released. Examination  Visit Vitals  BP (!) 102/57 (BP 1 Location: Left upper arm, BP Patient Position: Sitting, BP Cuff Size: Adult)   Pulse 64   Resp 16   SpO2 96%     Pleasant lady. Awake alert and oriented. Normal speech and normal lungs were normal cognition. Impression/Plan  Dementia likely Alzheimer's type progressive as noted above  Start Aricept in a customary fashion  Discussed potential side effects  Follow-up 3 months and if she is doing well then will have her follow-up every 6 months    eHllen Bolaños MD        This note was created using voice recognition software. Despite editing, there may be syntax errors.

## 2022-11-02 ENCOUNTER — TRANSCRIBE ORDER (OUTPATIENT)
Dept: SCHEDULING | Age: 87
End: 2022-11-02

## 2022-11-02 DIAGNOSIS — N18.32 STAGE 3B CHRONIC KIDNEY DISEASE (HCC): Primary | ICD-10-CM

## 2022-11-10 NOTE — PROCEDURE: COUNSELING
Detail Level: Zone
Detail Level: Detailed
Detail Level: Simple
General (Pediatric)
Detail Level: Generalized

## 2022-12-12 ENCOUNTER — TRANSCRIBE ORDER (OUTPATIENT)
Dept: SCHEDULING | Age: 87
End: 2022-12-12

## 2022-12-12 DIAGNOSIS — Z13.820 SPECIAL SCREENING FOR OSTEOPOROSIS: ICD-10-CM

## 2022-12-12 DIAGNOSIS — M81.0 OSTEOPOROSIS: Primary | ICD-10-CM

## 2023-04-22 DIAGNOSIS — Z13.820 SPECIAL SCREENING FOR OSTEOPOROSIS: Primary | ICD-10-CM

## 2023-04-22 DIAGNOSIS — N18.32 STAGE 3B CHRONIC KIDNEY DISEASE (HCC): Primary | ICD-10-CM

## 2023-05-13 ENCOUNTER — APPOINTMENT (OUTPATIENT)
Facility: HOSPITAL | Age: 88
End: 2023-05-13
Payer: MEDICARE

## 2023-05-13 ENCOUNTER — HOSPITAL ENCOUNTER (EMERGENCY)
Facility: HOSPITAL | Age: 88
Discharge: HOME | End: 2023-05-13
Attending: EMERGENCY MEDICINE
Payer: MEDICARE

## 2023-05-13 VITALS
HEIGHT: 64 IN | DIASTOLIC BLOOD PRESSURE: 64 MMHG | RESPIRATION RATE: 13 BRPM | HEART RATE: 59 BPM | OXYGEN SATURATION: 99 % | SYSTOLIC BLOOD PRESSURE: 137 MMHG | BODY MASS INDEX: 19.63 KG/M2 | TEMPERATURE: 97.6 F | WEIGHT: 115 LBS
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
## 2023-05-13 PROCEDURE — 99284 EMERGENCY DEPT VISIT MOD MDM: CPT

## 2023-05-13 PROCEDURE — 73130 X-RAY EXAM OF HAND: CPT

## 2023-05-13 PROCEDURE — 72125 CT NECK SPINE W/O DYE: CPT

## 2023-05-13 PROCEDURE — 73080 X-RAY EXAM OF ELBOW: CPT

## 2023-05-13 PROCEDURE — 70450 CT HEAD/BRAIN W/O DYE: CPT

## 2023-05-13 ASSESSMENT — PAIN - FUNCTIONAL ASSESSMENT: PAIN_FUNCTIONAL_ASSESSMENT: NONE - DENIES PAIN

## 2023-05-13 NOTE — ED PROVIDER NOTES
[facility] EMERGENCY DEPT  EMERGENCY DEPARTMENT ENCOUNTER      Pt Name: Feliberto Perez  MRN: [id number]  [place] 1935  Date of evaluation: 5/13/2023  Provider: Sharon Villagran MD      HISTORY OF PRESENT ILLNESS      HPI  22-year-old woman with a past medical history of hypertension who presents to the emergency department after a fall. Patient was walking outside. She says she tripped over her own feet and had a ground-level fall. She hit her head but denies loss of consciousness. She is also having some right hand pain where she sustained an abrasion. Not anticoagulated. No headache. No neck pain. No visual changes. She has no other complaints apart from some mild right hand discomfort. She denies any prodromal symptoms causing her to fall. Nursing Notes were reviewed. REVIEW OF SYSTEMS         Review of Systems  A complete review of systems was performed and all other systems reviewed are negative unless otherwise document in the HPI      PAST MEDICAL HISTORY     Past Medical History:   Diagnosis Date    Anxiety disorder     Constipation     Essential hypertension     Memory disorder     short term         SURGICAL HISTORY       Past Surgical History:   Procedure Laterality Date    APPENDECTOMY      TONSILLECTOMY           CURRENT MEDICATIONS       Previous Medications    ASPIRIN 81 MG CHEWABLE TABLET    Take 1 tablet by mouth daily    DONEPEZIL (ARICEPT) 10 MG TABLET    Take 1 tablet by mouth daily    DONEPEZIL (ARICEPT) 5 MG TABLET    1 tablet daily x30 days. When this bottle is empty start taking the 10 mg size.   Do not refill this prescription    HYDRALAZINE (APRESOLINE) 25 MG TABLET    Take 1 tablet by mouth 3 times daily    LOSARTAN-HYDROCHLOROTHIAZIDE (HYZAAR) 100-25 MG PER TABLET    Take 1 tablet by mouth daily    LUTEIN 20 MG CAPS    Take by mouth    METOPROLOL SUCCINATE (TOPROL XL) 50 MG EXTENDED RELEASE TABLET    Take by mouth daily    OMEPRAZOLE (PRILOSEC) 40 MG DELAYED RELEASE

## 2023-05-13 NOTE — ED TRIAGE NOTES
Pt arrives via EMS from [facility] for GLF on sidewalk, striking head. Pt states she tripped. Swelling noted to left temple area. Denies LOC. Denies blood thinners. Swelling also noted to right hand. Pt denies pain.

## 2023-06-29 ENCOUNTER — TELEPHONE (OUTPATIENT)
Age: 88
End: 2023-06-29

## 2023-06-29 NOTE — TELEPHONE ENCOUNTER
Patient son's requesting appointment.  Stated mother's symptoms are getting worst.    Please contact

## 2023-08-25 NOTE — PROCEDURE: COUNSELING
[address] [facility] coding opportunities       Chart reviewed, no opportunity found: CHART REVIEWED, NO OPPORTUNITY FOUND        Patients Insurance     Medicare Insurance: Medicare
Detail Level: Simple
Detail Level: Zone

## 2024-03-25 ENCOUNTER — APPOINTMENT (OUTPATIENT)
Dept: VASCULAR SURGERY | Facility: HOSPITAL | Age: 89
DRG: 057 | End: 2024-03-25
Attending: INTERNAL MEDICINE
Payer: MEDICARE

## 2024-03-25 ENCOUNTER — APPOINTMENT (OUTPATIENT)
Facility: HOSPITAL | Age: 89
DRG: 057 | End: 2024-03-25
Payer: MEDICARE

## 2024-03-25 ENCOUNTER — HOSPITAL ENCOUNTER (INPATIENT)
Facility: HOSPITAL | Age: 89
LOS: 3 days | Discharge: HOME | DRG: 057 | End: 2024-03-28
Attending: EMERGENCY MEDICINE | Admitting: INTERNAL MEDICINE
Payer: MEDICARE

## 2024-03-25 DIAGNOSIS — R26.81 GAIT INSTABILITY: ICD-10-CM

## 2024-03-25 DIAGNOSIS — R29.810 FACIAL DROOP: Primary | ICD-10-CM

## 2024-03-25 DIAGNOSIS — R41.0 CONFUSION: ICD-10-CM

## 2024-03-25 DIAGNOSIS — F03.90 DEMENTIA, UNSPECIFIED DEMENTIA SEVERITY, UNSPECIFIED DEMENTIA TYPE, UNSPECIFIED WHETHER BEHAVIORAL, PSYCHOTIC, OR MOOD DISTURBANCE OR ANXIETY (HCC): ICD-10-CM

## 2024-03-25 PROBLEM — R09.89 UNEQUAL BLOOD PRESSURE IN UPPER EXTREMITIES: Status: ACTIVE | Noted: 2024-03-25

## 2024-03-25 PROBLEM — I63.9 FACIAL DROOP DUE TO ACUTE STROKE (HCC): Status: ACTIVE | Noted: 2024-03-25

## 2024-03-25 LAB
ALBUMIN SERPL-MCNC: 3.7 G/DL (ref 3.5–5)
ALBUMIN/GLOB SERPL: 0.9 (ref 1.1–2.2)
ALP SERPL-CCNC: 80 U/L (ref 45–117)
ALT SERPL-CCNC: 25 U/L (ref 12–78)
ANION GAP SERPL CALC-SCNC: 6 MMOL/L (ref 5–15)
APPEARANCE UR: CLEAR
AST SERPL-CCNC: 23 U/L (ref 15–37)
BACTERIA URNS QL MICRO: NEGATIVE /HPF
BASOPHILS # BLD: 0 K/UL (ref 0–0.1)
BASOPHILS NFR BLD: 0 % (ref 0–1)
BILIRUB SERPL-MCNC: 0.4 MG/DL (ref 0.2–1)
BILIRUB UR QL: NEGATIVE
BUN SERPL-MCNC: 20 MG/DL (ref 6–20)
BUN/CREAT SERPL: 15 (ref 12–20)
CALCIUM SERPL-MCNC: 10.1 MG/DL (ref 8.5–10.1)
CHLORIDE SERPL-SCNC: 108 MMOL/L (ref 97–108)
CO2 SERPL-SCNC: 30 MMOL/L (ref 21–32)
COLOR UR: ABNORMAL
COMMENT:: NORMAL
CREAT SERPL-MCNC: 1.36 MG/DL (ref 0.55–1.02)
DIFFERENTIAL METHOD BLD: ABNORMAL
EOSINOPHIL # BLD: 0 K/UL (ref 0–0.4)
EOSINOPHIL NFR BLD: 1 % (ref 0–7)
EPITH CASTS URNS QL MICRO: ABNORMAL /LPF
ERYTHROCYTE [DISTWIDTH] IN BLOOD BY AUTOMATED COUNT: 13.6 % (ref 11.5–14.5)
GLOBULIN SER CALC-MCNC: 4.1 G/DL (ref 2–4)
GLUCOSE SERPL-MCNC: 101 MG/DL (ref 65–100)
GLUCOSE UR STRIP.AUTO-MCNC: NEGATIVE MG/DL
HCT VFR BLD AUTO: 39.4 % (ref 35–47)
HGB BLD-MCNC: 12.7 G/DL (ref 11.5–16)
HGB UR QL STRIP: ABNORMAL
HYALINE CASTS URNS QL MICRO: ABNORMAL /LPF (ref 0–2)
IMM GRANULOCYTES # BLD AUTO: 0 K/UL (ref 0–0.04)
IMM GRANULOCYTES NFR BLD AUTO: 1 % (ref 0–0.5)
INR PPP: 1 (ref 0.9–1.1)
KETONES UR QL STRIP.AUTO: NEGATIVE MG/DL
LEUKOCYTE ESTERASE UR QL STRIP.AUTO: NEGATIVE
LYMPHOCYTES # BLD: 1.9 K/UL (ref 0.8–3.5)
LYMPHOCYTES NFR BLD: 25 % (ref 12–49)
MCH RBC QN AUTO: 27 PG (ref 26–34)
MCHC RBC AUTO-ENTMCNC: 32.2 G/DL (ref 30–36.5)
MCV RBC AUTO: 83.8 FL (ref 80–99)
MONOCYTES # BLD: 0.6 K/UL (ref 0–1)
MONOCYTES NFR BLD: 7 % (ref 5–13)
NEUTS SEG # BLD: 5.1 K/UL (ref 1.8–8)
NEUTS SEG NFR BLD: 66 % (ref 32–75)
NITRITE UR QL STRIP.AUTO: NEGATIVE
NRBC # BLD: 0 K/UL (ref 0–0.01)
NRBC BLD-RTO: 0 PER 100 WBC
PH UR STRIP: 7.5 (ref 5–8)
PLATELET # BLD AUTO: 277 K/UL (ref 150–400)
PMV BLD AUTO: 10.3 FL (ref 8.9–12.9)
POTASSIUM SERPL-SCNC: 3.6 MMOL/L (ref 3.5–5.1)
PROT SERPL-MCNC: 7.8 G/DL (ref 6.4–8.2)
PROT UR STRIP-MCNC: NEGATIVE MG/DL
PROTHROMBIN TIME: 10.4 SEC (ref 9–11.1)
RBC # BLD AUTO: 4.7 M/UL (ref 3.8–5.2)
RBC #/AREA URNS HPF: ABNORMAL /HPF (ref 0–5)
SODIUM SERPL-SCNC: 144 MMOL/L (ref 136–145)
SP GR UR REFRACTOMETRY: 1.01 (ref 1–1.03)
SPECIMEN HOLD: NORMAL
TROPONIN I SERPL HS-MCNC: 5 NG/L (ref 0–51)
URINE CULTURE IF INDICATED: ABNORMAL
UROBILINOGEN UR QL STRIP.AUTO: 1 EU/DL (ref 0.2–1)
WBC # BLD AUTO: 7.7 K/UL (ref 3.6–11)
WBC URNS QL MICRO: ABNORMAL /HPF (ref 0–4)

## 2024-03-25 PROCEDURE — 71275 CT ANGIOGRAPHY CHEST: CPT

## 2024-03-25 PROCEDURE — 84484 ASSAY OF TROPONIN QUANT: CPT

## 2024-03-25 PROCEDURE — 70450 CT HEAD/BRAIN W/O DYE: CPT

## 2024-03-25 PROCEDURE — 80053 COMPREHEN METABOLIC PANEL: CPT

## 2024-03-25 PROCEDURE — 81001 URINALYSIS AUTO W/SCOPE: CPT

## 2024-03-25 PROCEDURE — 6370000000 HC RX 637 (ALT 250 FOR IP): Performed by: INTERNAL MEDICINE

## 2024-03-25 PROCEDURE — 2060000000 HC ICU INTERMEDIATE R&B

## 2024-03-25 PROCEDURE — 99285 EMERGENCY DEPT VISIT HI MDM: CPT

## 2024-03-25 PROCEDURE — 85610 PROTHROMBIN TIME: CPT

## 2024-03-25 PROCEDURE — 93880 EXTRACRANIAL BILAT STUDY: CPT

## 2024-03-25 PROCEDURE — 6360000004 HC RX CONTRAST MEDICATION: Performed by: STUDENT IN AN ORGANIZED HEALTH CARE EDUCATION/TRAINING PROGRAM

## 2024-03-25 PROCEDURE — 71045 X-RAY EXAM CHEST 1 VIEW: CPT

## 2024-03-25 PROCEDURE — 93005 ELECTROCARDIOGRAM TRACING: CPT | Performed by: EMERGENCY MEDICINE

## 2024-03-25 PROCEDURE — 36415 COLL VENOUS BLD VENIPUNCTURE: CPT

## 2024-03-25 PROCEDURE — 2580000003 HC RX 258: Performed by: INTERNAL MEDICINE

## 2024-03-25 PROCEDURE — 85025 COMPLETE CBC W/AUTO DIFF WBC: CPT

## 2024-03-25 RX ORDER — BUSPIRONE HYDROCHLORIDE 5 MG/1
5 TABLET ORAL 3 TIMES DAILY
Status: DISCONTINUED | OUTPATIENT
Start: 2024-03-25 | End: 2024-03-28 | Stop reason: HOSPADM

## 2024-03-25 RX ORDER — ALPRAZOLAM 0.5 MG/1
0.5 TABLET ORAL 3 TIMES DAILY PRN
Status: DISCONTINUED | OUTPATIENT
Start: 2024-03-25 | End: 2024-03-28 | Stop reason: HOSPADM

## 2024-03-25 RX ORDER — BUSPIRONE HYDROCHLORIDE 5 MG/1
5 TABLET ORAL 3 TIMES DAILY
COMMUNITY

## 2024-03-25 RX ORDER — PAROXETINE HYDROCHLORIDE 20 MG/1
10 TABLET, FILM COATED ORAL DAILY
Status: DISCONTINUED | OUTPATIENT
Start: 2024-03-26 | End: 2024-03-28 | Stop reason: HOSPADM

## 2024-03-25 RX ORDER — ASPIRIN 300 MG/1
300 SUPPOSITORY RECTAL DAILY
Status: DISCONTINUED | OUTPATIENT
Start: 2024-03-26 | End: 2024-03-28 | Stop reason: HOSPADM

## 2024-03-25 RX ORDER — ONDANSETRON 2 MG/ML
4 INJECTION INTRAMUSCULAR; INTRAVENOUS EVERY 6 HOURS PRN
Status: DISCONTINUED | OUTPATIENT
Start: 2024-03-25 | End: 2024-03-28 | Stop reason: HOSPADM

## 2024-03-25 RX ORDER — ATORVASTATIN CALCIUM 20 MG/1
80 TABLET, FILM COATED ORAL NIGHTLY
Status: DISCONTINUED | OUTPATIENT
Start: 2024-03-25 | End: 2024-03-28 | Stop reason: HOSPADM

## 2024-03-25 RX ORDER — ASPIRIN 81 MG/1
81 TABLET, CHEWABLE ORAL DAILY
Status: DISCONTINUED | OUTPATIENT
Start: 2024-03-26 | End: 2024-03-28 | Stop reason: HOSPADM

## 2024-03-25 RX ORDER — POLYETHYLENE GLYCOL 3350 17 G/17G
17 POWDER, FOR SOLUTION ORAL DAILY PRN
Status: DISCONTINUED | OUTPATIENT
Start: 2024-03-25 | End: 2024-03-28 | Stop reason: HOSPADM

## 2024-03-25 RX ORDER — ONDANSETRON 4 MG/1
4 TABLET, ORALLY DISINTEGRATING ORAL EVERY 8 HOURS PRN
Status: DISCONTINUED | OUTPATIENT
Start: 2024-03-25 | End: 2024-03-28 | Stop reason: HOSPADM

## 2024-03-25 RX ORDER — MEMANTINE HYDROCHLORIDE 10 MG/1
5 TABLET ORAL 2 TIMES DAILY
Status: DISCONTINUED | OUTPATIENT
Start: 2024-03-25 | End: 2024-03-28 | Stop reason: HOSPADM

## 2024-03-25 RX ORDER — PANTOPRAZOLE SODIUM 40 MG/1
40 TABLET, DELAYED RELEASE ORAL
Status: DISCONTINUED | OUTPATIENT
Start: 2024-03-26 | End: 2024-03-28 | Stop reason: HOSPADM

## 2024-03-25 RX ORDER — AMLODIPINE BESYLATE 5 MG/1
5 TABLET ORAL DAILY
COMMUNITY

## 2024-03-25 RX ORDER — CLONAZEPAM 0.5 MG/1
0.5 TABLET ORAL 2 TIMES DAILY
Status: DISCONTINUED | OUTPATIENT
Start: 2024-03-25 | End: 2024-03-28 | Stop reason: HOSPADM

## 2024-03-25 RX ORDER — SODIUM CHLORIDE 0.9 % (FLUSH) 0.9 %
5-40 SYRINGE (ML) INJECTION PRN
Status: DISCONTINUED | OUTPATIENT
Start: 2024-03-25 | End: 2024-03-28 | Stop reason: HOSPADM

## 2024-03-25 RX ORDER — AMLODIPINE BESYLATE 5 MG/1
5 TABLET ORAL DAILY
Status: DISCONTINUED | OUTPATIENT
Start: 2024-03-25 | End: 2024-03-28 | Stop reason: HOSPADM

## 2024-03-25 RX ORDER — LABETALOL HYDROCHLORIDE 5 MG/ML
10 INJECTION, SOLUTION INTRAVENOUS EVERY 4 HOURS PRN
Status: DISCONTINUED | OUTPATIENT
Start: 2024-03-25 | End: 2024-03-28 | Stop reason: HOSPADM

## 2024-03-25 RX ORDER — CLONAZEPAM 0.5 MG/1
0.5 TABLET ORAL 2 TIMES DAILY
COMMUNITY

## 2024-03-25 RX ORDER — SODIUM CHLORIDE 9 MG/ML
INJECTION, SOLUTION INTRAVENOUS CONTINUOUS
Status: DISCONTINUED | OUTPATIENT
Start: 2024-03-25 | End: 2024-03-27

## 2024-03-25 RX ORDER — HYDRALAZINE HYDROCHLORIDE 25 MG/1
25 TABLET, FILM COATED ORAL 3 TIMES DAILY
Status: DISCONTINUED | OUTPATIENT
Start: 2024-03-25 | End: 2024-03-25

## 2024-03-25 RX ORDER — SODIUM CHLORIDE 9 MG/ML
INJECTION, SOLUTION INTRAVENOUS PRN
Status: DISCONTINUED | OUTPATIENT
Start: 2024-03-25 | End: 2024-03-28 | Stop reason: HOSPADM

## 2024-03-25 RX ORDER — MEMANTINE HYDROCHLORIDE 5 MG/1
5 TABLET ORAL 2 TIMES DAILY
COMMUNITY

## 2024-03-25 RX ORDER — OLANZAPINE 5 MG/1
5 TABLET ORAL 2 TIMES DAILY
Status: ON HOLD | COMMUNITY
End: 2024-03-28 | Stop reason: HOSPADM

## 2024-03-25 RX ORDER — SODIUM CHLORIDE 0.9 % (FLUSH) 0.9 %
5-40 SYRINGE (ML) INJECTION EVERY 12 HOURS SCHEDULED
Status: DISCONTINUED | OUTPATIENT
Start: 2024-03-25 | End: 2024-03-28 | Stop reason: HOSPADM

## 2024-03-25 RX ORDER — METOPROLOL SUCCINATE 25 MG/1
25 TABLET, EXTENDED RELEASE ORAL DAILY
Status: DISCONTINUED | OUTPATIENT
Start: 2024-03-26 | End: 2024-03-25

## 2024-03-25 RX ORDER — DONEPEZIL HYDROCHLORIDE 5 MG/1
5 TABLET, FILM COATED ORAL NIGHTLY
Status: DISCONTINUED | OUTPATIENT
Start: 2024-03-25 | End: 2024-03-28 | Stop reason: HOSPADM

## 2024-03-25 RX ORDER — DONEPEZIL HYDROCHLORIDE 5 MG/1
10 TABLET, FILM COATED ORAL DAILY
Status: DISCONTINUED | OUTPATIENT
Start: 2024-03-26 | End: 2024-03-25

## 2024-03-25 RX ORDER — ALPRAZOLAM 0.5 MG/1
0.5 TABLET ORAL 3 TIMES DAILY PRN
COMMUNITY

## 2024-03-25 RX ADMIN — MEMANTINE 5 MG: 10 TABLET ORAL at 20:32

## 2024-03-25 RX ADMIN — ATORVASTATIN CALCIUM 80 MG: 20 TABLET, FILM COATED ORAL at 20:32

## 2024-03-25 RX ADMIN — DONEPEZIL HYDROCHLORIDE 5 MG: 5 TABLET, FILM COATED ORAL at 20:32

## 2024-03-25 RX ADMIN — IOPAMIDOL 80 ML: 755 INJECTION, SOLUTION INTRAVENOUS at 21:48

## 2024-03-25 RX ADMIN — ALPRAZOLAM 0.5 MG: 0.5 TABLET ORAL at 22:54

## 2024-03-25 RX ADMIN — SODIUM CHLORIDE: 9 INJECTION, SOLUTION INTRAVENOUS at 19:30

## 2024-03-25 RX ADMIN — BUSPIRONE HYDROCHLORIDE 5 MG: 5 TABLET ORAL at 20:32

## 2024-03-25 RX ADMIN — CLONAZEPAM 0.5 MG: 0.5 TABLET ORAL at 20:31

## 2024-03-25 ASSESSMENT — PAIN SCALES - GENERAL
PAINLEVEL_OUTOF10: 0
PAINLEVEL_OUTOF10: 0

## 2024-03-25 ASSESSMENT — PAIN - FUNCTIONAL ASSESSMENT: PAIN_FUNCTIONAL_ASSESSMENT: 0-10

## 2024-03-25 NOTE — H&P
non-tender, non-distended, normoactive bowel sounds are present  Lymph:  No cervical adenopathy  Musc:  No cyanosis or clubbing  Skin:  No rashes   Neuro:  Cranial nerves 3-12 are grossly intact, mild L facial droop,  strength is 5/5 bilaterally, dorsi / plantarflexion strength is 5/5 bilaterally, follows commands appropriately  Psych:  Alert with poor insight.  Oriented to person    Labs:    Recent Labs     03/25/24  1459   WBC 7.7   HGB 12.7   HCT 39.4        Recent Labs     03/25/24  1459      K 3.6      CO2 30   BUN 20   ALT 25     No results found for: \"GLUCPOC\"  No results for input(s): \"PH\", \"PCO2\", \"PO2\", \"HCO3\", \"FIO2\" in the last 72 hours.  Recent Labs     03/25/24  1459   INR 1.0       Radiology and EKG reviewed:   head CT neg    **Prior records, notes, labs, radiology, and medications reviewed in [facility]**       Assessment/Plan:       87 yo hx of HTN, depression/anxiety, dementia, presented w/ L facial droop, ataxia, unequal blood pressure in extremities    1) L facial droop/ataxia: concerning for CVA.  Outside window for TPA.  Head CT neg.  Will monitor on Tele.  Check head MRI, echo, carotid dopplers.  Start ASA, statin.  Consult Neuro, speech, PT/OT    2) Unequal BP in upper ext: unclear etiology.  No chest pain.  Will check chest CTA to r/o aortic dissection.  Use IV labetalol prn     3) HTN: cont norvasc.  Use IV labetalol prn    4) Depression/anxiety: consult pharm for med rec.  Cont buspirone, clonazepam, paxil    5) Dementia: lives in memory care.  Cont namenda, aricept    Code: Full     Risk of deterioration: high      Total time spent with patient care: 70 Minutes **I personally saw and examined the patient during this time period**                 Care Plan discussed with: Patient, nursing, son     Discussed:  Care Plan    Prophylaxis:  SCD's    Probable Disposition:  SNF/LTC           ___________________________________________________    Attending Physician: Heriberto

## 2024-03-25 NOTE — ED TRIAGE NOTES
Patient arrives with c/o left sided facial droop, intermittent slurred speech. Per son patient has been showing sx, intermittently for the past \"couple weeks\" that resolve.     States that current sx noticed by son on Saturday.     Patient is alert and oriented to name, year, birthday. Patient follows commands.     Denies headache, numbness/tingling, unilateral weakness, blurred vision.     Denies chest pain, SOB.

## 2024-03-25 NOTE — ACP (ADVANCE CARE PLANNING)
Advance Care Planning     Advance Care Planning (ACP) Physician/NP/PA Conversation    Date of Conversation: 3/25/2024  Diagnosis: stroke  Conducted with: patient, son    Healthcare Decision Maker:Teodoro Nathan  No healthcare decision makers have been documented.  Click here to complete HealthCare Decision Makers including selection of the Healthcare Decision Maker Relationship (ie \"Primary\")         Care Preferences:    Hospitalization:  \"If your health worsens and it becomes clear that your chance of recovery is unlikely, what would be your preference regarding hospitalization?\"  Do everything for now    Ventilation:  \"If you were unable to breath on your own and your chance of recovery was unlikely, what would be your preference about the use of a ventilator (breathing machine) if it was available to you?\"  Full Code    Resuscitation:  \"In the event your heart stopped as a result of an underlying serious health condition, would you want attempts made to restart your heart, or would you prefer a natural death?\"  Full Code    Conversation Outcomes / Follow-Up Plan:  Discussed with patient and son, Teodoro, regarding current medical issues, prognosis, and treatments.  Teodoro is POA.  He does not know code status, but will check advanced directive at home.  Goal is to return to memory unit.      Length of Voluntary ACP Conversation in minutes:  16 minutes    Heriberto Marie MD

## 2024-03-25 NOTE — ED PROVIDER NOTES
tablet by mouth daily    LUTEIN 20 MG CAPS    Take by mouth    METOPROLOL SUCCINATE (TOPROL XL) 50 MG EXTENDED RELEASE TABLET    Take by mouth daily    OMEPRAZOLE (PRILOSEC) 40 MG DELAYED RELEASE CAPSULE    Take 1 capsule by mouth daily    PAROXETINE (PAXIL) 10 MG TABLET    Take 1.5 tablets by mouth daily       ALLERGIES     Sulfa antibiotics    FAMILY HISTORY       Family History   Problem Relation Age of Onset    Kidney Disease Mother         polycystic kidney dz    Kidney Disease Brother         polycystic kidney dz    Kidney Disease Maternal Uncle         polycystic kidney dz          SOCIAL HISTORY       Social History     Socioeconomic History    Marital status:    Tobacco Use    Smoking status: Never    Smokeless tobacco: Never   Substance and Sexual Activity    Alcohol use: Yes           PHYSICAL EXAM    (up to 7 for level 4, 8 or more for level 5)     ED Triage Vitals [03/25/24 1430]   BP Temp Temp Source Pulse Respirations SpO2 Height Weight - Scale   121/83 97.5 °F (36.4 °C) Oral 90 18 97 % 1.575 m (5' 2\") 49.9 kg (110 lb)       Body mass index is 20.12 kg/m².    Physical Exam  Vitals and nursing note reviewed.   Constitutional:       Appearance: Normal appearance.   HENT:      Head: Normocephalic and atraumatic.   Eyes:      General: No scleral icterus.     Extraocular Movements: Extraocular movements intact.   Cardiovascular:      Rate and Rhythm: Normal rate and regular rhythm.   Pulmonary:      Effort: Pulmonary effort is normal.      Breath sounds: Normal breath sounds.   Abdominal:      General: Abdomen is flat.   Musculoskeletal:         General: No deformity.   Skin:     General: Skin is warm.   Neurological:      Mental Status: She is alert and oriented to person, place, and time.      Motor: No weakness.      Comments: No obvious strength deficit.  Patient has some very minimal left-sided facial droop on the left when compared to the right   Psychiatric:         Mood and Affect: Mood

## 2024-03-26 ENCOUNTER — APPOINTMENT (OUTPATIENT)
Facility: HOSPITAL | Age: 89
DRG: 057 | End: 2024-03-26
Attending: INTERNAL MEDICINE
Payer: MEDICARE

## 2024-03-26 PROBLEM — R41.0 CONFUSION: Status: ACTIVE | Noted: 2024-03-26

## 2024-03-26 PROBLEM — R29.810 FACIAL DROOP: Status: ACTIVE | Noted: 2024-03-26

## 2024-03-26 LAB
ALBUMIN SERPL-MCNC: 3.6 G/DL (ref 3.5–5)
ALBUMIN/GLOB SERPL: 0.9 (ref 1.1–2.2)
ALP SERPL-CCNC: 75 U/L (ref 45–117)
ALT SERPL-CCNC: 26 U/L (ref 12–78)
ANION GAP SERPL CALC-SCNC: 5 MMOL/L (ref 5–15)
AST SERPL-CCNC: 24 U/L (ref 15–37)
BILIRUB SERPL-MCNC: 0.6 MG/DL (ref 0.2–1)
BUN SERPL-MCNC: 18 MG/DL (ref 6–20)
BUN/CREAT SERPL: 18 (ref 12–20)
CALCIUM SERPL-MCNC: 9.6 MG/DL (ref 8.5–10.1)
CHLORIDE SERPL-SCNC: 107 MMOL/L (ref 97–108)
CHOLEST SERPL-MCNC: 269 MG/DL
CO2 SERPL-SCNC: 29 MMOL/L (ref 21–32)
CREAT SERPL-MCNC: 1.02 MG/DL (ref 0.55–1.02)
CRP SERPL-MCNC: 0.97 MG/DL (ref 0–0.3)
ECHO AO ASC DIAM: 3.1 CM
ECHO AO ASCENDING AORTA INDEX: 2.09 CM/M2
ECHO AO ROOT DIAM: 1.9 CM
ECHO AO ROOT INDEX: 1.28 CM/M2
ECHO AV AREA PEAK VELOCITY: 1.2 CM2
ECHO AV AREA VTI: 1.1 CM2
ECHO AV AREA/BSA PEAK VELOCITY: 0.8 CM2/M2
ECHO AV AREA/BSA VTI: 0.7 CM2/M2
ECHO AV MEAN GRADIENT: 5 MMHG
ECHO AV MEAN VELOCITY: 1.1 M/S
ECHO AV PEAK GRADIENT: 8 MMHG
ECHO AV PEAK VELOCITY: 1.4 M/S
ECHO AV VELOCITY RATIO: 0.64
ECHO AV VTI: 31.5 CM
ECHO BSA: 1.48 M2
ECHO BSA: 1.48 M2
ECHO LA DIAMETER INDEX: 1.89 CM/M2
ECHO LA DIAMETER: 2.8 CM
ECHO LA TO AORTIC ROOT RATIO: 1.47
ECHO LV E' LATERAL VELOCITY: 5 CM/S
ECHO LV E' SEPTAL VELOCITY: 4 CM/S
ECHO LV EDV A4C: 45 ML
ECHO LV EDV INDEX A4C: 30 ML/M2
ECHO LV EJECTION FRACTION A4C: 61 %
ECHO LV ESV A4C: 18 ML
ECHO LV ESV INDEX A4C: 12 ML/M2
ECHO LV FRACTIONAL SHORTENING: 17 % (ref 28–44)
ECHO LV INTERNAL DIMENSION DIASTOLE INDEX: 2.43 CM/M2
ECHO LV INTERNAL DIMENSION DIASTOLIC: 3.6 CM (ref 3.9–5.3)
ECHO LV INTERNAL DIMENSION SYSTOLIC INDEX: 2.03 CM/M2
ECHO LV INTERNAL DIMENSION SYSTOLIC: 3 CM
ECHO LV IVSD: 1.3 CM (ref 0.6–0.9)
ECHO LV MASS 2D: 141.5 G (ref 67–162)
ECHO LV MASS INDEX 2D: 95.6 G/M2 (ref 43–95)
ECHO LV POSTERIOR WALL DIASTOLIC: 1.1 CM (ref 0.6–0.9)
ECHO LV RELATIVE WALL THICKNESS RATIO: 0.61
ECHO LVOT AREA: 2 CM2
ECHO LVOT AV VTI INDEX: 0.59
ECHO LVOT DIAM: 1.6 CM
ECHO LVOT MEAN GRADIENT: 2 MMHG
ECHO LVOT PEAK GRADIENT: 3 MMHG
ECHO LVOT PEAK VELOCITY: 0.9 M/S
ECHO LVOT STROKE VOLUME INDEX: 25.1 ML/M2
ECHO LVOT SV: 37.2 ML
ECHO LVOT VTI: 18.5 CM
ECHO MV A VELOCITY: 0.63 M/S
ECHO MV AREA VTI: 2.4 CM2
ECHO MV E DECELERATION TIME (DT): 111.9 MS
ECHO MV E VELOCITY: 0.35 M/S
ECHO MV E/A RATIO: 0.56
ECHO MV E/E' LATERAL: 7
ECHO MV E/E' RATIO (AVERAGED): 7.88
ECHO MV LVOT VTI INDEX: 0.84
ECHO MV MAX VELOCITY: 1.1 M/S
ECHO MV MEAN GRADIENT: 2 MMHG
ECHO MV MEAN VELOCITY: 0.7 M/S
ECHO MV PEAK GRADIENT: 4 MMHG
ECHO MV VTI: 15.6 CM
ECHO RV INTERNAL DIMENSION: 2.5 CM
ECHO RV TAPSE: 2 CM (ref 1.7–?)
EKG ATRIAL RATE: 83 BPM
EKG DIAGNOSIS: NORMAL
EKG P AXIS: 44 DEGREES
EKG P-R INTERVAL: 150 MS
EKG Q-T INTERVAL: 404 MS
EKG QRS DURATION: 84 MS
EKG QTC CALCULATION (BAZETT): 474 MS
EKG R AXIS: -6 DEGREES
EKG T AXIS: 19 DEGREES
EKG VENTRICULAR RATE: 83 BPM
ERYTHROCYTE [DISTWIDTH] IN BLOOD BY AUTOMATED COUNT: 13.5 % (ref 11.5–14.5)
EST. AVERAGE GLUCOSE BLD GHB EST-MCNC: 134 MG/DL
GLOBULIN SER CALC-MCNC: 3.9 G/DL (ref 2–4)
GLUCOSE SERPL-MCNC: 124 MG/DL (ref 65–100)
HBA1C MFR BLD: 6.3 % (ref 4–5.6)
HCT VFR BLD AUTO: 38.2 % (ref 35–47)
HDLC SERPL-MCNC: 60 MG/DL
HDLC SERPL: 4.5 (ref 0–5)
HGB BLD-MCNC: 12.5 G/DL (ref 11.5–16)
LDLC SERPL CALC-MCNC: 175.6 MG/DL (ref 0–100)
MAGNESIUM SERPL-MCNC: 1.8 MG/DL (ref 1.6–2.4)
MCH RBC QN AUTO: 27.2 PG (ref 26–34)
MCHC RBC AUTO-ENTMCNC: 32.7 G/DL (ref 30–36.5)
MCV RBC AUTO: 83 FL (ref 80–99)
NRBC # BLD: 0 K/UL (ref 0–0.01)
NRBC BLD-RTO: 0 PER 100 WBC
PHOSPHATE SERPL-MCNC: 3.5 MG/DL (ref 2.6–4.7)
PLATELET # BLD AUTO: 253 K/UL (ref 150–400)
PMV BLD AUTO: 10.1 FL (ref 8.9–12.9)
POTASSIUM SERPL-SCNC: 3.1 MMOL/L (ref 3.5–5.1)
PROT SERPL-MCNC: 7.5 G/DL (ref 6.4–8.2)
RBC # BLD AUTO: 4.6 M/UL (ref 3.8–5.2)
SODIUM SERPL-SCNC: 141 MMOL/L (ref 136–145)
TRIGL SERPL-MCNC: 167 MG/DL
TSH SERPL DL<=0.05 MIU/L-ACNC: 3.04 UIU/ML (ref 0.36–3.74)
VAS LEFT CCA DIST EDV: 15.5 CM/S
VAS LEFT CCA DIST PSV: 72.1 CM/S
VAS LEFT CCA PROX EDV: 15.5 CM/S
VAS LEFT CCA PROX PSV: 79.4 CM/S
VAS LEFT ECA EDV: 10 CM/S
VAS LEFT ECA PSV: 86.7 CM/S
VAS LEFT ICA DIST EDV: 9.4 CM/S
VAS LEFT ICA DIST PSV: 31.5 CM/S
VAS LEFT ICA MID EDV: 15.5 CM/S
VAS LEFT ICA MID PSV: 61.1 CM/S
VAS LEFT ICA PROX EDV: 11.9 CM/S
VAS LEFT ICA PROX PSV: 52 CM/S
VAS LEFT ICA/CCA PSV: 0.8 NO UNITS
VAS LEFT SUBCLAVIAN PROX EDV: 0 CM/S
VAS LEFT SUBCLAVIAN PROX PSV: 55.7 CM/S
VAS LEFT VERTEBRAL EDV: 0 CM/S
VAS LEFT VERTEBRAL PSV: 65.4 CM/S
VAS RIGHT CCA DIST EDV: 11.3 CM/S
VAS RIGHT CCA DIST PSV: 59.8 CM/S
VAS RIGHT CCA PROX EDV: 14.5 CM/S
VAS RIGHT CCA PROX PSV: 82.4 CM/S
VAS RIGHT ECA EDV: 0 CM/S
VAS RIGHT ECA PSV: 93.5 CM/S
VAS RIGHT ICA DIST EDV: 15 CM/S
VAS RIGHT ICA DIST PSV: 78.8 CM/S
VAS RIGHT ICA MID EDV: 13.1 CM/S
VAS RIGHT ICA MID PSV: 62.4 CM/S
VAS RIGHT ICA PROX EDV: 9.5 CM/S
VAS RIGHT ICA PROX PSV: 57 CM/S
VAS RIGHT ICA/CCA PSV: 1.3 NO UNITS
VAS RIGHT SUBCLAVIAN PROX EDV: 0 CM/S
VAS RIGHT SUBCLAVIAN PROX PSV: 176.2 CM/S
VAS RIGHT VERTEBRAL EDV: 10.1 CM/S
VAS RIGHT VERTEBRAL PSV: 113.8 CM/S
VLDLC SERPL CALC-MCNC: 33.4 MG/DL
WBC # BLD AUTO: 8.1 K/UL (ref 3.6–11)

## 2024-03-26 PROCEDURE — 93010 ELECTROCARDIOGRAM REPORT: CPT | Performed by: INTERNAL MEDICINE

## 2024-03-26 PROCEDURE — 84443 ASSAY THYROID STIM HORMONE: CPT

## 2024-03-26 PROCEDURE — 97116 GAIT TRAINING THERAPY: CPT

## 2024-03-26 PROCEDURE — 99223 1ST HOSP IP/OBS HIGH 75: CPT | Performed by: PSYCHIATRY & NEUROLOGY

## 2024-03-26 PROCEDURE — 97530 THERAPEUTIC ACTIVITIES: CPT

## 2024-03-26 PROCEDURE — 6360000002 HC RX W HCPCS: Performed by: STUDENT IN AN ORGANIZED HEALTH CARE EDUCATION/TRAINING PROGRAM

## 2024-03-26 PROCEDURE — 97162 PT EVAL MOD COMPLEX 30 MIN: CPT

## 2024-03-26 PROCEDURE — 85027 COMPLETE CBC AUTOMATED: CPT

## 2024-03-26 PROCEDURE — 95816 EEG AWAKE AND DROWSY: CPT | Performed by: PSYCHIATRY & NEUROLOGY

## 2024-03-26 PROCEDURE — 84100 ASSAY OF PHOSPHORUS: CPT

## 2024-03-26 PROCEDURE — 80053 COMPREHEN METABOLIC PANEL: CPT

## 2024-03-26 PROCEDURE — 6360000004 HC RX CONTRAST MEDICATION: Performed by: STUDENT IN AN ORGANIZED HEALTH CARE EDUCATION/TRAINING PROGRAM

## 2024-03-26 PROCEDURE — 86140 C-REACTIVE PROTEIN: CPT

## 2024-03-26 PROCEDURE — 97535 SELF CARE MNGMENT TRAINING: CPT

## 2024-03-26 PROCEDURE — 92610 EVALUATE SWALLOWING FUNCTION: CPT

## 2024-03-26 PROCEDURE — 97165 OT EVAL LOW COMPLEX 30 MIN: CPT

## 2024-03-26 PROCEDURE — 94761 N-INVAS EAR/PLS OXIMETRY MLT: CPT

## 2024-03-26 PROCEDURE — 2060000000 HC ICU INTERMEDIATE R&B

## 2024-03-26 PROCEDURE — 80061 LIPID PANEL: CPT

## 2024-03-26 PROCEDURE — 6370000000 HC RX 637 (ALT 250 FOR IP): Performed by: INTERNAL MEDICINE

## 2024-03-26 PROCEDURE — C8929 TTE W OR WO FOL WCON,DOPPLER: HCPCS

## 2024-03-26 PROCEDURE — 83036 HEMOGLOBIN GLYCOSYLATED A1C: CPT

## 2024-03-26 PROCEDURE — 36415 COLL VENOUS BLD VENIPUNCTURE: CPT

## 2024-03-26 PROCEDURE — 83735 ASSAY OF MAGNESIUM: CPT

## 2024-03-26 RX ORDER — ACETAMINOPHEN 500 MG
500 TABLET ORAL EVERY 4 HOURS PRN
COMMUNITY

## 2024-03-26 RX ORDER — MULTIVIT-MIN/FA/LYCOPEN/LUTEIN .4-300-25
1 TABLET ORAL DAILY
COMMUNITY

## 2024-03-26 RX ORDER — DOCUSATE SODIUM 100 MG/1
100 CAPSULE, LIQUID FILLED ORAL 2 TIMES DAILY
COMMUNITY

## 2024-03-26 RX ORDER — CALCIUM CARBONATE 300MG(750)
400 TABLET,CHEWABLE ORAL EVERY EVENING
COMMUNITY

## 2024-03-26 RX ORDER — HALOPERIDOL 5 MG/ML
5 INJECTION INTRAMUSCULAR ONCE
Status: COMPLETED | OUTPATIENT
Start: 2024-03-26 | End: 2024-03-26

## 2024-03-26 RX ORDER — POTASSIUM CHLORIDE 1.5 G/1.58G
10 POWDER, FOR SOLUTION ORAL DAILY
COMMUNITY

## 2024-03-26 RX ORDER — ONDANSETRON 4 MG/1
4 TABLET, ORALLY DISINTEGRATING ORAL EVERY 6 HOURS PRN
COMMUNITY

## 2024-03-26 RX ADMIN — PANTOPRAZOLE SODIUM 40 MG: 40 TABLET, DELAYED RELEASE ORAL at 06:48

## 2024-03-26 RX ADMIN — HALOPERIDOL LACTATE 5 MG: 5 INJECTION, SOLUTION INTRAMUSCULAR at 17:59

## 2024-03-26 RX ADMIN — ASPIRIN 81 MG: 81 TABLET, CHEWABLE ORAL at 09:45

## 2024-03-26 RX ADMIN — CLONAZEPAM 0.5 MG: 0.5 TABLET ORAL at 09:45

## 2024-03-26 RX ADMIN — BUSPIRONE HYDROCHLORIDE 5 MG: 5 TABLET ORAL at 09:45

## 2024-03-26 RX ADMIN — ATORVASTATIN CALCIUM 80 MG: 20 TABLET, FILM COATED ORAL at 21:18

## 2024-03-26 RX ADMIN — ALPRAZOLAM 0.5 MG: 0.5 TABLET ORAL at 17:01

## 2024-03-26 RX ADMIN — PERFLUTREN 1.5 ML: 6.52 INJECTION, SUSPENSION INTRAVENOUS at 12:30

## 2024-03-26 RX ADMIN — PAROXETINE HYDROCHLORIDE 10 MG: 20 TABLET, FILM COATED ORAL at 09:44

## 2024-03-26 RX ADMIN — MEMANTINE 5 MG: 10 TABLET ORAL at 21:18

## 2024-03-26 RX ADMIN — BUSPIRONE HYDROCHLORIDE 5 MG: 5 TABLET ORAL at 13:47

## 2024-03-26 RX ADMIN — MEMANTINE 5 MG: 10 TABLET ORAL at 09:45

## 2024-03-26 RX ADMIN — DONEPEZIL HYDROCHLORIDE 5 MG: 5 TABLET, FILM COATED ORAL at 21:18

## 2024-03-26 RX ADMIN — AMLODIPINE BESYLATE 5 MG: 5 TABLET ORAL at 09:44

## 2024-03-26 RX ADMIN — BUSPIRONE HYDROCHLORIDE 5 MG: 5 TABLET ORAL at 21:18

## 2024-03-26 ASSESSMENT — PAIN SCALES - GENERAL: PAINLEVEL_OUTOF10: 0

## 2024-03-26 NOTE — CARE COORDINATION
KIRSTEN Note:  Pt off the floor for EEG.  Will contact pt's son for initial assessment.    JESUS Hurd  3/26/2024  9:10 AM

## 2024-03-26 NOTE — PROCEDURES
[place], [place]        [phone] (Main)  [phone] (Medical Records)     Routine EEG (16-channel)  EEG Technologist:  Kenn Wiggins  Date of Study:   3-26-24  Date of Interpretation: 3/26/24    Indication:    88 y.o. female admitted on 3/25/2024  2:40 PM facial droop,confusion, hx dementia    PSHx lists Cranial Surgery: No  Sleep Deprived for this study:  No    Impression:  Mildly abnormal awake EEG.  There was excessive beta activity suggestive of medication effect (ie clonazepam).  The background amplitude was very low making it difficult to estimate the PDR on both sides.  At times, it appeared to be in the normal awake range.  However, in light of pt's dementia diagnosis, this may have been brief runs of beta activity.   Drowsiness was recorded and was normal.  No epileptiform discharges were seen during this recording.  Clinical and Neuro-Imaging correlation is necessary    =================================  Technical: 16-channel, multiple montages, digital EEG, 10-20 international placement system format.   Simultaneous video recording is performed.  The technical quality of the study was adequate.  Single lead EKG was recorded.     Interpretation:  Patient is described as eyes closed and awake at the beginning of the recording.  The background is symmetric and very low amplitude. Hard to gauge the posterior dominant rhythm due to the very low amplitude of the background.  Occasionally an 8-9 Hz PDR is seen on either side.  At other times, this PDR seems to be faster, in the beta frequency range (this may be due to medication effect, ie clonazepam). Photic stimulation does not result in a driving response on either side.  Drowsiness is recorded and is normal.  Sleep is not recorded.  No seizure discharges or electrographic seizures are recorded.  Single lead EKG is normal.     Events:  None    =================================    Home Meds    Medications Prior to Admission:

## 2024-03-26 NOTE — CARE COORDINATION
03/26/24 4190   Service Assessment   Patient Orientation Unable to Assess   Cognition Dementia / Early Alzheimer's   History Provided By Child/Family  (son, Teodoro)   Primary Caregiver Self   Support Systems Children;Family Members;Home Care Staff   Patient's Healthcare Decision Maker is: Legal Next of Kin   PCP Verified by CM Yes  (Debbie Brennan MD)   Last Visit to PCP Within last year   Prior Functional Level Independent in ADLs/IADLs;Assistance with the following:;Mobility;Other (see comment);Housework;Cooking  (medications)   Current Functional Level Assistance with the following:;Cooking;Housework;Mobility;Other (see comment)  (medications)   Can patient return to prior living arrangement Yes   Family able to assist with home care needs: Yes   Would you like for me to discuss the discharge plan with any other family members/significant others, and if so, who? No   Financial Resources Medicare;Other (Comment)  (supplement--[employer] Benefit)   Community Resources Assisted Living   Social/Functional History   Lives With Other (comment)  ([facility])   Type of Home Assisted living   Home Layout One level   Home Access Level entry   Bathroom Shower/Tub Walk-in shower   Bathroom Toilet Handicap height   Bathroom Equipment Grab bars in shower;Built-in shower seat;Grab bars around toilet   Bathroom Accessibility Wheelchair accessible   Home Equipment Cane;Walker, rolling   Active  No   Patient's  Info family or [facility] shuttle   Discharge Planning   Type of Residence Assisted living   Patient expects to be discharged to: Assisted living   Services At/After Discharge   Mode of Transport at Discharge Other (see comment)  (patient's son, Teodoro)     Pt was off the floor, then having in-room testing.  TCT to pt's son, Dixon \"Teodoro\"Ashlee (ODELL) for initial assessment.  Pharmacy: through [facility] or [facility]  Patient [facility] in an apt at [facility]; she uses a cane with ambulation  Pt has been

## 2024-03-26 NOTE — CONSULTS
Discussed with Dr Solis via [employer] (secure messaging)     C) Risk of Complication, Morbidity, or Mortality:     Escalation of hospital level of care (High Risk):  [] Yes [] No  Decision to de-escalate care or DNR due to poor prognosis (High Risk):     [] Yes   [] No  Parenteral controlled substances prescribed (High Risk):     [] Yes [] No  Drug Therapy requiring intensive monitoring for toxicity (High Risk)  [] Yes   [] No  Prescription drug management (Moderate Risk):       [] Yes     [] No  Decision regarding minor surgery (Moderate Risk):      [] Yes      [] No

## 2024-03-27 ENCOUNTER — HOSPITAL ENCOUNTER (INPATIENT)
Facility: HOSPITAL | Age: 89
Discharge: HOME | DRG: 057 | End: 2024-03-30
Payer: MEDICARE

## 2024-03-27 PROCEDURE — 99231 SBSQ HOSP IP/OBS SF/LOW 25: CPT | Performed by: PSYCHIATRY & NEUROLOGY

## 2024-03-27 PROCEDURE — 2060000000 HC ICU INTERMEDIATE R&B

## 2024-03-27 PROCEDURE — 2580000003 HC RX 258: Performed by: INTERNAL MEDICINE

## 2024-03-27 PROCEDURE — 97530 THERAPEUTIC ACTIVITIES: CPT

## 2024-03-27 PROCEDURE — 94761 N-INVAS EAR/PLS OXIMETRY MLT: CPT

## 2024-03-27 PROCEDURE — 70551 MRI BRAIN STEM W/O DYE: CPT

## 2024-03-27 PROCEDURE — 6370000000 HC RX 637 (ALT 250 FOR IP): Performed by: INTERNAL MEDICINE

## 2024-03-27 PROCEDURE — 97116 GAIT TRAINING THERAPY: CPT

## 2024-03-27 RX ADMIN — ASPIRIN 81 MG: 81 TABLET, CHEWABLE ORAL at 09:00

## 2024-03-27 RX ADMIN — PAROXETINE HYDROCHLORIDE 10 MG: 20 TABLET, FILM COATED ORAL at 09:03

## 2024-03-27 RX ADMIN — MEMANTINE 5 MG: 10 TABLET ORAL at 09:04

## 2024-03-27 RX ADMIN — MEMANTINE 5 MG: 10 TABLET ORAL at 20:56

## 2024-03-27 RX ADMIN — BUSPIRONE HYDROCHLORIDE 5 MG: 5 TABLET ORAL at 14:41

## 2024-03-27 RX ADMIN — DONEPEZIL HYDROCHLORIDE 5 MG: 5 TABLET, FILM COATED ORAL at 20:56

## 2024-03-27 RX ADMIN — ATORVASTATIN CALCIUM 80 MG: 20 TABLET, FILM COATED ORAL at 20:57

## 2024-03-27 RX ADMIN — SODIUM CHLORIDE, PRESERVATIVE FREE 10 ML: 5 INJECTION INTRAVENOUS at 23:35

## 2024-03-27 RX ADMIN — PANTOPRAZOLE SODIUM 40 MG: 40 TABLET, DELAYED RELEASE ORAL at 09:03

## 2024-03-27 RX ADMIN — CLONAZEPAM 0.5 MG: 0.5 TABLET ORAL at 09:00

## 2024-03-27 RX ADMIN — BUSPIRONE HYDROCHLORIDE 5 MG: 5 TABLET ORAL at 20:57

## 2024-03-27 RX ADMIN — SODIUM CHLORIDE, PRESERVATIVE FREE 10 ML: 5 INJECTION INTRAVENOUS at 09:04

## 2024-03-27 RX ADMIN — BUSPIRONE HYDROCHLORIDE 5 MG: 5 TABLET ORAL at 09:04

## 2024-03-27 RX ADMIN — CLONAZEPAM 0.5 MG: 0.5 TABLET ORAL at 20:56

## 2024-03-27 RX ADMIN — AMLODIPINE BESYLATE 5 MG: 5 TABLET ORAL at 09:00

## 2024-03-27 NOTE — CARE COORDINATION
3/27/2024  3:58 PM  CM spoke w/ Nancy Meese resident coordinator at [facility], she confirmed NO bedside eval is required for pt to return to facility since she has not been out of facility more than 3 days.  No COVID testing to return to facility  Nursing to call report to Yumi [phone]  Nursing/CM please Fax DC Summary to [phone]   Pt's son Teodoro will provide transportation  ANAMARIA Garcia        12:43 PM  Pt emergently admitted 3/25/24 for L facial droop/ataxia, unequal BP in upper ext , HTN, discussed in IDR is continuing to require medical management  Transitions of Care Plan:  RUR 12 % Low Risk of Readmission   LOS 2 Days  Medical management continues, MRI pending  Neurology following  PT, OT treating, the recommendation is for HH at [place]  Pt resides at [facility] in memory Care, CM placed TC to DON Nancy Meese, requesting call back and on site eval at [facility] to return to [facility] today , await response  DC when stable to [facility] esther/ [facility]   Outpatient follow up PCP,specialists  Family will transport at DC  ANAMARIA Garcia

## 2024-03-27 NOTE — PLAN OF CARE
Problem: Occupational Therapy - Adult  Goal: By Discharge: Performs self-care activities at highest level of function for planned discharge setting.  See evaluation for individualized goals.  Description: FUNCTIONAL STATUS PRIOR TO ADMISSION:  The patient is a unreliable historian d/t baseline dementia.  Per chart review, patient lives at a memory care facility.  Patient denies using any assistive device for mobility and unsure of the level of assist required for ADLs at baseline.        HOME SUPPORT: Patient lived at a memory care facility per chart review.      Occupational Therapy Goals:  Initiated 3/26/2024  1.  Patient will perform grooming with supervision standing at the sink >5 minutes within 7 day(s).  2.  Patient will perform upper body dressing and bathing with supervision within 7 day(s).  3.  Patient will perform lower body dressing and bathing with Set-up within 7 day(s).  4.  Patient will perform toilet transfers with Supervision  within 7 day(s).  5.  Patient will perform all aspects of toileting with Supervision within 7 day(s).  6.  Patient will participate in upper extremity therapeutic exercise/activities with Modified Hart for 10 minutes within 7 day(s).    Outcome: Progressing   OCCUPATIONAL THERAPY EVALUATION    Patient: Yumi Sandoval (88 y.o. female)  Date: 3/26/2024  Primary Diagnosis: Gait instability [R26.81]  Facial droop [R29.810]  Facial droop due to acute stroke (HCC) [I63.9, R29.810]         Precautions: fall    ASSESSMENT :  The patient is limited by decreased functional mobility, independence in ADLs, activity tolerance, safety awareness, cognition, balance following admission on 3/25/24 for neurological work-up with c/o L facial droop and elevated but variable BP readings from different UE.  Per chart, patient has a history of dementia and lives at a memory care facility. She is an unreliable historian and no family present at time of eval to clarify PLOF.  RN reports 
assistance and HHPT upon d/c.          PLAN:  Patient continues to benefit from skilled intervention to address the above impairments.  Continue treatment per established plan of care.    Recommendation for discharge: (in order for the patient to meet his/her long term goals): Therapy 2 days/week in the home and also see \"other factors to consider\" below for additional discharge concerns/needs    Other factors to consider for discharge: impaired cognition and concern for safely navigating or managing the home environment    IF patient discharges home will need the following DME: none       SUBJECTIVE:   Patient stated, \"I feel funny... heavy headed.\"    OBJECTIVE DATA SUMMARY:   Patient received supine in bed and was agreeable to participate in PT session.   Patient was cleared by nursing to participate in PT session.   No family present during session though PCT sitter present for beginning and conclusion of session.       Patient Vitals for the past 6 hrs:   Pulse BP Patient Position   03/27/24 1128 90 (!) 143/57 Sitting   03/27/24 0902 92 (!) 145/70 Sitting       Critical Behavior:  Orientation  Overall Orientation Status: Impaired  Orientation Level: Oriented to person;Oriented to place;Disoriented to time;Disoriented to situation  Cognition  Overall Cognitive Status: Exceptions  Arousal/Alertness: Delayed responses to stimuli  Following Commands: Follows one step commands consistently;Follows multistep commands with increased time;Follows multistep commands with repitition  Attention Span: Difficulty dividing attention  Memory: Decreased recall of recent events  Safety Judgement: Decreased awareness of need for assistance  Insights: Decreased awareness of deficits  Initiation: Requires cues for some  Sequencing: Requires cues for some    Functional Mobility Training:  Bed Mobility:  Bed Mobility Training  Bed Mobility Training: Yes  Rolling: Minimum assistance;Assist X1;Adaptive equipment  Supine to Sit: 
fasteners. Patient is SBA for oral/facial/hand hygiene standing with increased time after which pt returned to chair. Patient would benefit from continued skilled OT services while at [facility] in order to increase safety and independence with self care and functional transfers/mobility. Recommend discharge back to [facility] with HHOT and increased assist when medically appropriate.            PLAN :  Patient continues to benefit from skilled intervention to address the above impairments.  Continue treatment per established plan of care to address goals.    Recommend with staff: up to chair for meals, up to commode for toileting    Recommend next OT session: continue to progress towards goals    Recommendation for discharge: (in order for the patient to meet his/her long term goals): back to [facility] with HHOT    Other factors to consider for discharge: no additional factors    IF patient discharges home will need the following DME: none       SUBJECTIVE:   Patient stated “I am ready to get going.”    OBJECTIVE DATA SUMMARY:   Cognitive/Behavioral Status:  Orientation  Overall Orientation Status: Impaired  Orientation Level: Oriented to place;Oriented to person;Disoriented to time  Cognition  Overall Cognitive Status: Exceptions  Arousal/Alertness: Delayed responses to stimuli  Following Commands: Follows one step commands consistently;Follows multistep commands with increased time;Follows multistep commands with repitition  Attention Span: Difficulty dividing attention  Memory: Decreased recall of recent events  Safety Judgement: Decreased awareness of need for assistance  Insights: Decreased awareness of deficits  Initiation: Requires cues for some  Sequencing: Requires cues for some    Functional Mobility and Transfers for ADLs:  Bed Mobility:  Bed Mobility Training  Bed Mobility Training: Yes  Rolling: Minimum assistance;Assist X1;Adaptive equipment  Supine to Sit: Minimum assistance;Assist X1;Additional time  Scooting: 
Standing Unsupported with Eyes Closed: Able to stand 10 seconds with supervision  7. Standing Unsupported with Feet Together: Needs help to attain position but able to stand 15 seconds feet together  8. Reach Forward with Outstretched Arm While Standing: Can reach forward 5 cm (2 inches)  9.  Object from Floor from a Standing Position: Unable to  and needs supervision while trying  10. Turning to Look Behind Over Left and Right Shoulders While Standing: Looks behind one side only other side shows less weight shift  11. Turn 360 Degrees: Able to turn 360 degrees safely but slowly  12. Place Alternate Foot on Step or Stool While Standing Unsupported: Able to complete greater than 2 steps needs minimal assist  13. Standing Unsupported One Foot in Front: Loses balance while stepping or standing  14. Standing on One Leg: Unable to try needs assist to prevent fall  Whitman Balance Score: 24         56=Maximum possible score;   0-20=High fall risk  21-40=Moderate fall risk   41-56=Low fall risk                                                                                                                                                                                                                               Pain Ratin/10   Pain Intervention(s):       Activity Tolerance:   Good and tolerates ADLS without rest breaks    After treatment:   Patient left in no apparent distress in bed, Call bell within reach, Bed/ chair alarm activated, Side rails x3, and PCT sitter present in room    COMMUNICATION/EDUCATION:   The patient's plan of care was discussed with: occupational therapist and registered nurse    Patient Education  Education Given To: Patient  Education Provided: Role of Therapy;Plan of Care;ADL Adaptive Strategies;Transfer Training  Education Method: Verbal;Demonstration  Barriers to Learning: Cognition;Lack of Family Support  Education Outcome: Continued education needed    Thank you for this

## 2024-03-28 VITALS
TEMPERATURE: 97.7 F | HEART RATE: 78 BPM | RESPIRATION RATE: 18 BRPM | OXYGEN SATURATION: 94 % | BODY MASS INDEX: 20.24 KG/M2 | DIASTOLIC BLOOD PRESSURE: 56 MMHG | WEIGHT: 110 LBS | SYSTOLIC BLOOD PRESSURE: 125 MMHG | HEIGHT: 62 IN

## 2024-03-28 PROCEDURE — 6370000000 HC RX 637 (ALT 250 FOR IP): Performed by: INTERNAL MEDICINE

## 2024-03-28 PROCEDURE — 99231 SBSQ HOSP IP/OBS SF/LOW 25: CPT | Performed by: PSYCHIATRY & NEUROLOGY

## 2024-03-28 PROCEDURE — 94761 N-INVAS EAR/PLS OXIMETRY MLT: CPT

## 2024-03-28 PROCEDURE — 2580000003 HC RX 258: Performed by: INTERNAL MEDICINE

## 2024-03-28 RX ORDER — ATORVASTATIN CALCIUM 80 MG/1
80 TABLET, FILM COATED ORAL NIGHTLY
Qty: 30 TABLET | Refills: 3 | Status: SHIPPED | OUTPATIENT
Start: 2024-03-28

## 2024-03-28 RX ADMIN — BUSPIRONE HYDROCHLORIDE 5 MG: 5 TABLET ORAL at 08:40

## 2024-03-28 RX ADMIN — ASPIRIN 81 MG: 81 TABLET, CHEWABLE ORAL at 08:40

## 2024-03-28 RX ADMIN — PANTOPRAZOLE SODIUM 40 MG: 40 TABLET, DELAYED RELEASE ORAL at 05:55

## 2024-03-28 RX ADMIN — MEMANTINE 5 MG: 10 TABLET ORAL at 08:40

## 2024-03-28 RX ADMIN — AMLODIPINE BESYLATE 5 MG: 5 TABLET ORAL at 08:40

## 2024-03-28 RX ADMIN — CLONAZEPAM 0.5 MG: 0.5 TABLET ORAL at 08:40

## 2024-03-28 RX ADMIN — SODIUM CHLORIDE, PRESERVATIVE FREE 10 ML: 5 INJECTION INTRAVENOUS at 08:40

## 2024-03-28 RX ADMIN — PAROXETINE HYDROCHLORIDE 10 MG: 20 TABLET, FILM COATED ORAL at 08:40

## 2024-03-28 NOTE — CARE COORDINATION
CM Note:  Pt d/c'd back to [facility] transported by her son.  LAURO order for PT/OT with [facility] faxed to [facility].    JESUS Hurd  3/28/2024  11:25 AM

## 2024-03-28 NOTE — PROGRESS NOTES
0707: Bedside and Verbal shift change report given to Jonas RN (oncoming nurse) by Shamika RN (offgoing nurse). Report included the following information Adult Overview, Recent Results, Med Rec Status, and Cardiac Rhythm SR .      0957: messaged provider advised son in room wanting to get a timeframe on when he may be in to see her to discharge her.     1038: attempted to call report to [facility] at number listed in CM note from yesterday. No answer left VM for Yumi with unit telephone number.     1059: Yumi with [facility] called back. Gave report to her, advised would also fax discharge summary to them. She advised to let son know to  the atorvastatin from [facility] on his way.   
Bedside and Verbal shift change report given to Jonas (oncoming nurse) by Shamika (offgoing nurse). Report included the following information Nurse Handoff Report.     
INPATIENT NEUROLOGY FOLLOW-UP NOTE    NAME:  Yumi Sandoval  MRN:  [id number]  : 1935    REASON FOR FOLLOW UP:  Hx Dementia; Left facial droop x 1 week a/w dysarthria, drooling, ataxia.     3/27/24  INTERVAL HX:   Sitter in room. Pt calm, resting in bed.  Is oriented to self (tells me her full name when I ask her).  Speech is slow but clear and coherent.  Answers some questions.  Face symmetric.  EOMI.  Moves extremities to command.   No AM labs to review.     3/28/24  INTERVAL HX:   Brain MRI done yesterday does not show any acute abnormality.  Reviewed images with Son.  Pt getting ready to be discharged.  RN putting her in wheelchair      IMPRESSION/ PLAN:     See Neurology Consult dated 3-26-24 for full details    Left facial droop x 1 week a/w dysarthria, drooling, ataxia.  Other than TAMAR on admission (now resolved) no major electrolyte abnormality.  UA negative for UTI.  No appreciable facial droop at time of Neurology consult and exam was non-focal, though pt appeared confused (baseline hx of dementia, living in memory care).  ? Unwitnessed szr at [facility] and was post-ictal afterwards.    No seizure discharges seen on EEG 3-26-24  Brain MRI 3-27-24 without any acute abnormalities  Not starting on AED at this time   Will s/o as pt being discharged    =====================================    Brain MRI w/o contrast 3-27-24: FINDINGS:  There is no acute infarct.  No hemorrhage, extra-axial fluid collection, or mass effect. Patchy periventricular and subcortical white matter T2/FLAIR hyperintensity, nonspecific and likely microangiopathic ischemic changes.  Moderate diffuse parenchymal volume loss with exvacuodilatation of the ventricles and extraventricular CSF spaces.  There is no cerebellar tonsillar herniation. Expected arterial flow-voids are  present.  Status post bilateral lens replacement. The orbits are otherwise unremarkable.  Mild diffuse mucosal thickening of the paranasal sinuses. The mastoids 
Medication History Review by Pharmacist:    Comments/Recommendations:   Unable to ascertain medication history information with patient  Called patient's facility [facility] and was faxed patient's most recent medication list  Medication history was completed using outpatient Rx query fill history and facility medication list        Prior to Admission Medications:   No current facility-administered medications on file prior to encounter.     Current Outpatient Medications on File Prior to Encounter   Medication Sig Dispense Refill    ondansetron (ZOFRAN-ODT) 4 MG disintegrating tablet Take 1 tablet by mouth every 6 hours as needed for Nausea or Vomiting      potassium chloride (KLOR-CON) 20 MEQ packet Take 10 mEq by mouth daily      Magnesium 400 MG TABS Take 400 mg by mouth every evening With meals      docusate sodium (COLACE) 100 MG capsule Take 1 capsule by mouth 2 times daily Indications: Constipation Hold for loose stools      acetaminophen (TYLENOL) 500 MG tablet Take 1 tablet by mouth every 4 hours as needed for Pain      Multiple Vitamins-Minerals (CEROVITE SENIOR) TABS Take 1 tablet by mouth daily      amLODIPine (NORVASC) 5 MG tablet Take 1 tablet by mouth daily      OLANZapine (ZYPREXA) 5 MG tablet Take 1 tablet by mouth in the morning and at bedtime      ALPRAZolam (XANAX) 0.5 MG tablet Take 1 tablet by mouth 3 times daily as needed for Sleep or Anxiety.      busPIRone (BUSPAR) 5 MG tablet Take 1 tablet by mouth 3 times daily      clonazePAM (KLONOPIN) 0.5 MG tablet Take 1 tablet by mouth in the morning and at bedtime.      memantine (NAMENDA) 5 MG tablet Take 1 tablet by mouth 2 times daily      aspirin 81 MG chewable tablet Take 1 tablet by mouth daily      donepezil (ARICEPT) 5 MG tablet Take 1 tablet by mouth nightly      Lutein 20 MG CAPS Take by mouth      omeprazole (PRILOSEC) 40 MG delayed release capsule Take 1 capsule by mouth daily      PARoxetine (PAXIL) 10 MG tablet 
Patient's sitter reports good tolerance of regular diet.   No additional SLP needed at this time.  
Spiritual Care Assessment/Progress Note  [facility]    Name: Yumi Sandoval MRN: [id number]    Age: 88 y.o.     Sex: female   Language: English     Date: 3/26/2024            Total Time Calculated: 5 min              Spiritual Assessment begun in [facility] B3 INTERMEDIATE CARE UNIT  Service Provided For:: Patient  Referral/Consult From:: Clergy/  Encounter Overview/Reason : Rituals, Rites and Sacraments    Spiritual beliefs:      [x] Involved in a lisset tradition/spiritual practice: Orthodoxy     [] Supported by a lisset community:      [] Claims no spiritual orientation:      [] Seeking spiritual identity:           [] Adheres to an individual form of spirituality:      [] Not able to assess:                Identified resources for coping and support system:   Support System: Children       [x] Prayer                  [] Devotional reading               [x] Music                  [] Guided Imagery     [] Pet visits                                        [] Other: (COMMENT)     Specific area/focus of visit   Encounter:    Crisis:    Spiritual/Emotional needs: Type: Spiritual Support  Ritual, Rites and Sacraments: Type: Orthodoxy Communion  Grief, Loss, and Adjustments:    Ethics/Mediation:    Behavioral Health:    Palliative Care:    Advance Care Planning:      Plan/Referrals: Continue to visit, (comment)    Narrative: Mrs. Sandoval was sitting up in bed eating her lunch.  She declined communion today.  Let her know about the Holy Week schedule and will plan to visit on Thursday.     Sr. CHRISTI Patino, RN, ACSW, LCSW   Page:  [phone]    
Stroke Education provided to caregiver / friend and the following topics were discussed    1. Patients personal risk factors for stroke are hypertension    2. Warning signs of Stroke:        * Sudden numbness or weakness of the face, arm or leg, especially on one side of          The body            * Sudden confusion, trouble speaking or understanding        * Sudden trouble seeing in one or both eyes        * Sudden trouble walking, dizziness, loss of balance or coordination        * Sudden severe headache with no known cause      3. Importance of activation Emergency Medical Services ( 9-1-1 ) immediately if experience any warning signs of stroke.    4. Be sure and schedule a follow-up appointment with your primary care doctor or any specialists as instructed.     5. You must take medicine every day to treat your risk factors for stroke.  Be sure to take your medicines exactly as your doctor tells you: no more, no less.  Know what your medicines are for , what they do.  Anti-thrombotics /anticoagulants can help prevent strokes.  You are taking the following medicine(s)  ASA     6.  Smoking and second-hand smoke greatly increase your risk of stroke, cardiovascular disease and death. Smoking history never    7. Information provided was [facility] Stroke Education Binder    8. Documentation of teaching completed in Patient Education Activity and on Care Plan with teaching response noted?  yes   
TERRIE VERDUGO [facility]  [address] [place] [address] [phone]        Hospitalist Progress Note      NAME: Yumi Sandoval   :  1935  MRM:  [id number]    Date/Time of service: 3/27/2024  10:32 AM       Subjective:     Chief Complaint:  Patient was personally seen and examined by me during this time period.  Chart reviewed.  F/up stroke rule out.    Feeling well  today without concerns. No CP, SOB. No numbness, weakness or tingling. No vision or hearing changes.       Objective:       Vitals:       Last 24hrs VS reviewed since prior progress note. Most recent are:    Vitals:    24 0902   BP: (!) 145/70   Pulse: 92   Resp: 16   Temp: 97.4 °F (36.3 °C)   SpO2: 94%     SpO2 Readings from Last 6 Encounters:   24 94%   23 99%          Intake/Output Summary (Last 24 hours) at 3/27/2024 1032  Last data filed at 3/27/2024 0902  Gross per 24 hour   Intake 340 ml   Output --   Net 340 ml          Exam:     Physical Exam:    Gen:  Well-developed, well-nourished, in no acute distress  HEENT:  Pink conjunctivae, EOMI, hearing intact to voice, moist mucous membranes  Resp:  No accessory muscle use, clear breath sounds without wheezes rales or rhonchi  Card:  No murmurs, normal S1, S2 without thrills, bruits or peripheral edema  Abd:  Soft, non-tender, non-distended  Musc:  No cyanosis or clubbing  Skin:  No rashes or ulcers, skin turgor is good  Neuro:  facial droop present. Strength and sensation intact in all extremities. follows commands appropriately  Psych:  Good insight, oriented to person, place but not to day, alert      Medications Reviewed: (see below)    Lab Data Reviewed: (see below)    ______________________________________________________________________    Medications:     Current Facility-Administered Medications   Medication Dose Route Frequency    sodium chloride flush 0.9 % injection 5-40 mL  5-40 mL IntraVENous 2 times per day    sodium chloride flush 
To assist primary nurse, Discharge Summary was faxed to [facility] per request of .   
5-40 mL  5-40 mL IntraVENous PRN    0.9 % sodium chloride infusion   IntraVENous PRN    ondansetron (ZOFRAN-ODT) disintegrating tablet 4 mg  4 mg Oral Q8H PRN    Or    ondansetron (ZOFRAN) injection 4 mg  4 mg IntraVENous Q6H PRN    polyethylene glycol (GLYCOLAX) packet 17 g  17 g Oral Daily PRN    atorvastatin (LIPITOR) tablet 80 mg  80 mg Oral Nightly    aspirin chewable tablet 81 mg  81 mg Oral Daily    Or    aspirin suppository 300 mg  300 mg Rectal Daily    0.9 % sodium chloride infusion   IntraVENous Continuous    PARoxetine (PAXIL) tablet 10 mg  10 mg Oral Daily    pantoprazole (PROTONIX) tablet 40 mg  40 mg Oral QAM AC    donepezil (ARICEPT) tablet 5 mg  5 mg Oral Nightly    labetalol (NORMODYNE;TRANDATE) injection 10 mg  10 mg IntraVENous Q4H PRN    ALPRAZolam (XANAX) tablet 0.5 mg  0.5 mg Oral TID PRN    amLODIPine (NORVASC) tablet 5 mg  5 mg Oral Daily    busPIRone (BUSPAR) tablet 5 mg  5 mg Oral TID    clonazePAM (KLONOPIN) tablet 0.5 mg  0.5 mg Oral BID    memantine (NAMENDA) tablet 5 mg  5 mg Oral BID          Lab Review:     Recent Labs     03/25/24  1459 03/26/24  0321   WBC 7.7 8.1   HGB 12.7 12.5   HCT 39.4 38.2    253     Recent Labs     03/25/24  1459 03/26/24  0321    141   K 3.6 3.1*    107   CO2 30 29   BUN 20 18   MG  --  1.8   PHOS  --  3.5   ALT 25 26   INR 1.0  --      No results found for: \"GLUCPOC\"       Assessment / Plan:     89 yo hx of HTN, depression/anxiety, dementia, presented w/ L facial droop, ataxia, unequal blood pressure in extremities     L facial droop/ataxia: POA. Acute. concerning for CVA.  Outside window for TPA.  Head CT neg. LDL not at goal. A1c prediabetic but at goal for age. UA, trop and vasc duplex neg.  - MRI  - EEG  - ECHO  - Neuro  - neuro checks  - ASA  - Statin  - PT/OT cleared for home (memory care) with HH     Unequal BP in upper ext: POA. Chronic. 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 No CP. CTA 
ondansetron (ZOFRAN-ODT) disintegrating tablet 4 mg, 4 mg, Oral, Q8H PRN **OR** ondansetron (ZOFRAN) injection 4 mg, 4 mg, IntraVENous, Q6H PRN, Heriberto Marie MD    polyethylene glycol (GLYCOLAX) packet 17 g, 17 g, Oral, Daily PRN, Heriberto Marie MD    atorvastatin (LIPITOR) tablet 80 mg, 80 mg, Oral, Nightly, Heriberto Marie MD, 80 mg at 03/26/24 2118    aspirin chewable tablet 81 mg, 81 mg, Oral, Daily, 81 mg at 03/27/24 0900 **OR** aspirin suppository 300 mg, 300 mg, Rectal, Daily, Heriberto Marie MD    PARoxetine (PAXIL) tablet 10 mg, 10 mg, Oral, Daily, Heriberto Marie MD, 10 mg at 03/27/24 0903    pantoprazole (PROTONIX) tablet 40 mg, 40 mg, Oral, QAM AC, Heriberto Marie MD, 40 mg at 03/27/24 0903    donepezil (ARICEPT) tablet 5 mg, 5 mg, Oral, Nightly, Heriberto Marie MD, 5 mg at 03/26/24 2118    labetalol (NORMODYNE;TRANDATE) injection 10 mg, 10 mg, IntraVENous, Q4H PRN, Heriberto Marie MD    ALPRAZolam (XANAX) tablet 0.5 mg, 0.5 mg, Oral, TID PRN, Heriberto Marie MD, 0.5 mg at 03/26/24 1701    amLODIPine (NORVASC) tablet 5 mg, 5 mg, Oral, Daily, Heriberto Marie MD, 5 mg at 03/27/24 0900    busPIRone (BUSPAR) tablet 5 mg, 5 mg, Oral, TID, Heriberto Marie MD, 5 mg at 03/27/24 0904    clonazePAM (KLONOPIN) tablet 0.5 mg, 0.5 mg, Oral, BID, Heriberto Marie MD, 0.5 mg at 03/27/24 0900    memantine (NAMENDA) tablet 5 mg, 5 mg, Oral, BID, Heriberto Marie MD, 5 mg at 03/27/24 0904      =====================================    MEDICAL DECISION MAKING (2 of 3: A +/- B +/- C)    A) Number and Complexity of Problem Addressed:  [] 1 stable, acute illness (Low)  [] 1 stable, chronic illness (Low)  [] 1 acute illness with systemic symptoms (Moderate)  [] 1 undiagnosed new problem with uncertain prognosis (Moderate)  [] 1 or more chronic illness with exacerbation, progression, or side effects of treatment (Moderate)  [] 1 or more chronic illnesses with severe exacerbation, progression, or side effects of treatment (High)  [x] 1 acute or chronic illness or injury that 
person, Disoriented to place, Disoriented to situation, and Disoriented to time  Cognition: Impulsive, Memory loss, and Poor safety awareness    Dysphagia:  Oral Assessment:  Oral Motor   Labial: No impairment (no facial droop noted, but other staff report that this is intermittent)  Dentition: Natural  Oral Hygiene: Clean  Oral Hygiene Comments: wfl  Lingual: No impairment  Velum: No Impairment  Mandible: No impairment  P.O. Trials:  PO Trials  Assessment Method(s): Observation;Palpation  Patient Position: upright in bed  Vocal Quality:  (low volume)  Consistency Presented: Thin;Regular  How Presented: Self-fed/presented;Straw;Successive Swallows  Bolus Acceptance: No impairment  Bolus Formation/Control: No impairment  Propulsion: No impairment  Oral Residue: None  Initiation of Swallow: No impairment  Laryngeal Elevation: Functional  Aspiration Signs/Symptoms: None  Pharyngeal Phase Characteristics: No impairment, issues, or problems            Motor Speech:     40% at phrase level due to low volume and mumbling. Son reports this is premorbid from the dementia    Language Comprehension and Expression:               Frequent low content utterances-premorbid confirmed by son    Neuro-Linguistics:                      Voice:   Low volume    Respiratory Status/Airway:  Room air                               After treatment:   Patient left in no apparent distress in bed, Nursing notified, and Caregiver present    COMMUNICATION/EDUCATION:   Patient was educated regarding her deficit(s) of dysarthria? as this relates to her diagnosis of possible CVA.  She demonstrated Guarded understanding as evidenced by Limited understanding/response due to cognitive status.    The patient's plan of care including recommendations, planned interventions, and recommended diet changes were discussed with: Registered nurse and Certified nursing assistant/patient care technician    Patient/family have participated as able in goal setting

## 2024-03-28 NOTE — DISCHARGE SUMMARY
Hospitalist Discharge Summary     Patient ID:  Yumi Sandoval  [id number]  88 y.o.  1935    Admit date: 3/25/2024    Discharge date and time: 3/28/2024    Admission Diagnoses: Gait instability [R26.81]  Facial droop [R29.810]  Facial droop due to acute stroke (HCC) [I63.9, R29.810]    Discharge Diagnoses:    Principal Problem:    Facial droop due to acute stroke (HCC)  Active Problems:    Unequal blood pressure in upper extremities    Dementia (HCC)    Confusion    Facial droop  Resolved Problems:    * No resolved hospital problems. *         Hospital Course:   87 yo hx of HTN, depression/anxiety, dementia, presented w/ L facial droop, ataxia, unequal blood pressure in extremities     L facial droop/ataxia: POA. Acute. concerning for CVA initially.  Outside window for TPA POA.  Head CT neg. LDL not at goal. A1c prediabetic but at goal for age. UA, trop and vasc duplex neg. ECHO ok. EEG ok. MRI neg. Likely related to parkinsons vs Fillmore palsy.  - PCP and neuro follow up  - ASA and statin     Unequal BP in upper ext: POA. Chronic. 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 No CP. CTA NAP.  - ASA and statin     HTN: POA. Chronic.  - cont norvasc     Depression/anxiety: POA. Chronic.  - Cont buspirone, clonazepam, paxil     Dementia: POA. Chronic. lives in memory care  - Cont namenda, aricept    Imaging  MRI BRAIN WO CONTRAST    Result Date: 3/27/2024  No acute brain infarct or intracranial hemorrhage. Moderate chronic microangiopathic white matter changes. Nonspecific brain atrophy.     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 Incidentals as above including coronary artery disease.    CT HEAD WO CONTRAST    Result Date: 3/25/2024  No acute intracranial process. Imaging

## 2024-03-28 NOTE — DISCHARGE INSTRUCTIONS
HOSPITALIST DISCHARGE INSTRUCTIONS  NAME:  Yumi Sandoval   :  1935   MRN:  [id number]     Date/Time:  3/28/2024 10:21 AM    ADMIT DATE: 3/25/2024     DISCHARGE DATE: 3/28/2024     DISCHARGE DIAGNOSIS:  Facial palsy    DISCHARGE INSTRUCTIONS:  Thank you for allowing us to participate in your care. Your discharging Hospitalist is Shasha Solis MD. You were admitted for evaluation and treatment of the above. You were seen by a neurologist and a stroke and seizures were ruled out. Your facial nerve palsy causing the facial droop will likely improve over time. Please take your meds as prescribed and follow up with your PCP and neurologist.      MEDICATIONS:    It is important that you take the medication exactly as they are prescribed.   Keep your medication in the bottles provided by the pharmacist and keep a list of the medication names, dosages, and times to be taken in your wallet.   Do not take other medications without consulting your doctor.             If you experience any of the following symptoms then please call your primary care physician or return to the emergency room if you cannot get hold of your doctor:  Fever, chills, nausea, vomiting, diarrhea, change in mentation, falling, bleeding, shortness of breath    Follow Up:  Please call the below provider to arrange hospital follow up appointment      Debbie Brennan MD  [address] [facility] [address] [facility] [address]  [facility] [address] [phone]    Schedule an appointment as soon as possible for a visit      Aysha Liu MD  [address] [facility] [address]  [facility] [address] [phone]    Schedule an appointment as soon as possible for a visit          Information obtained by :  I understand that if any problems occur once I am at home I am to contact my physician.    I understand and acknowledge receipt of the instructions indicated above.

## 2024-03-30 ENCOUNTER — HOSPITAL ENCOUNTER (EMERGENCY)
Facility: HOSPITAL | Age: 89
Discharge: HOME | DRG: 884 | End: 2024-03-31
Attending: EMERGENCY MEDICINE
Payer: MEDICARE

## 2024-03-30 ENCOUNTER — APPOINTMENT (OUTPATIENT)
Facility: HOSPITAL | Age: 89
DRG: 884 | End: 2024-03-30
Payer: MEDICARE

## 2024-03-30 VITALS
TEMPERATURE: 97.9 F | SYSTOLIC BLOOD PRESSURE: 131 MMHG | OXYGEN SATURATION: 93 % | RESPIRATION RATE: 16 BRPM | DIASTOLIC BLOOD PRESSURE: 72 MMHG | HEART RATE: 80 BPM
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
## 2024-03-30 DIAGNOSIS — S60.221A CONTUSION OF RIGHT HAND, INITIAL ENCOUNTER: ICD-10-CM

## 2024-03-30 DIAGNOSIS — S61.419A LACERATION OF DORSUM OF HAND: ICD-10-CM

## 2024-03-30 PROCEDURE — 73110 X-RAY EXAM OF WRIST: CPT

## 2024-03-30 NOTE — ED TRIAGE NOTES
Pt is brought in by EMS.  Pt is from a facility and is baseline confused.  EMS states that pt fell today 2x in an hour.  During the second fall the pt has injured her right wrist and has a small laceration.  EMS states no LOC.  Pt states that she does not remember if she hit her head or not.  Pt is not on blood thinners per EMS

## 2024-03-31 NOTE — ED PROVIDER NOTES
[facility] EMERGENCY DEPT  EMERGENCY DEPARTMENT ENCOUNTER      Pt Name: Yumi Sandoval  MRN: [id number]  Birthdate 1935  Date of evaluation: 3/30/2024  Provider: Bianca Menjivar DO    CHIEF COMPLAINT       Chief Complaint   Patient presents with    Fall    Wrist Injury    Laceration         HISTORY OF PRESENT ILLNESS   (Location/Symptom, Timing/Onset, Context/Setting, Quality, Duration, Modifying Factors, Severity)  Note limiting factors.   HPI      Review of External Medical Records:     Nursing Notes were reviewed.    REVIEW OF SYSTEMS    (2-9 systems for level 4, 10 or more for level 5)     Review of Systems    Except as noted above the remainder of the review of systems was reviewed and negative.       PAST MEDICAL HISTORY     Past Medical History:   Diagnosis Date    Anxiety disorder     Constipation     Essential hypertension     Memory disorder     short term         SURGICAL HISTORY       Past Surgical History:   Procedure Laterality Date    APPENDECTOMY      TONSILLECTOMY           CURRENT MEDICATIONS       Discharge Medication List as of 3/30/2024 10:42 PM        CONTINUE these medications which have NOT CHANGED    Details   atorvastatin (LIPITOR) 80 MG tablet Take 1 tablet by mouth nightly, Disp-30 tablet, R-3Normal      ondansetron (ZOFRAN-ODT) 4 MG disintegrating tablet Take 1 tablet by mouth every 6 hours as needed for Nausea or VomitingHistorical Med      potassium chloride (KLOR-CON) 20 MEQ packet Take 10 mEq by mouth dailyHistorical Med      Magnesium 400 MG TABS Take 400 mg by mouth every evening With mealsHistorical Med      docusate sodium (COLACE) 100 MG capsule Take 1 capsule by mouth 2 times daily Indications: Constipation Hold for loose stoolsHistorical Med      acetaminophen (TYLENOL) 500 MG tablet Take 1 tablet by mouth every 4 hours as needed for PainHistorical Med      Multiple Vitamins-Minerals (CEROVITE SENIOR) TABS Take 1 tablet by mouth dailyHistorical Med      amLODIPine (NORVASC) 5

## 2024-04-01 ENCOUNTER — APPOINTMENT (OUTPATIENT)
Facility: HOSPITAL | Age: 89
DRG: 884 | End: 2024-04-01
Payer: MEDICARE

## 2024-04-01 ENCOUNTER — HOSPITAL ENCOUNTER (INPATIENT)
Facility: HOSPITAL | Age: 89
LOS: 4 days | Discharge: NURSING, REHAB OR LONG-TERM CARE | DRG: 884 | End: 2024-04-05
Attending: STUDENT IN AN ORGANIZED HEALTH CARE EDUCATION/TRAINING PROGRAM | Admitting: HOSPITALIST
Payer: MEDICARE

## 2024-04-01 DIAGNOSIS — R29.6 FREQUENT FALLS: ICD-10-CM

## 2024-04-01 DIAGNOSIS — F03.C18 SEVERE DEMENTIA WITH OTHER BEHAVIORAL DISTURBANCE, UNSPECIFIED DEMENTIA TYPE (HCC): Primary | ICD-10-CM

## 2024-04-01 DIAGNOSIS — M62.82 NON-TRAUMATIC RHABDOMYOLYSIS: ICD-10-CM

## 2024-04-01 DIAGNOSIS — N17.9 AKI (ACUTE KIDNEY INJURY) (HCC): ICD-10-CM

## 2024-04-01 DIAGNOSIS — R53.1 GENERALIZED WEAKNESS: ICD-10-CM

## 2024-04-01 LAB
ALBUMIN SERPL-MCNC: 3.7 G/DL (ref 3.5–5)
ALBUMIN/GLOB SERPL: 0.9 (ref 1.1–2.2)
ALP SERPL-CCNC: 84 U/L (ref 45–117)
ALT SERPL-CCNC: 33 U/L (ref 12–78)
ANION GAP SERPL CALC-SCNC: 5 MMOL/L (ref 5–15)
APPEARANCE UR: CLEAR
AST SERPL-CCNC: 39 U/L (ref 15–37)
BACTERIA URNS QL MICRO: NEGATIVE /HPF
BASOPHILS # BLD: 0 K/UL (ref 0–0.1)
BASOPHILS NFR BLD: 0 % (ref 0–1)
BILIRUB SERPL-MCNC: 0.6 MG/DL (ref 0.2–1)
BILIRUB UR QL: NEGATIVE
BUN SERPL-MCNC: 27 MG/DL (ref 6–20)
BUN/CREAT SERPL: 20 (ref 12–20)
CALCIUM SERPL-MCNC: 10.2 MG/DL (ref 8.5–10.1)
CHLORIDE SERPL-SCNC: 109 MMOL/L (ref 97–108)
CK SERPL-CCNC: 736 U/L (ref 26–192)
CO2 SERPL-SCNC: 27 MMOL/L (ref 21–32)
COLOR UR: ABNORMAL
CREAT SERPL-MCNC: 1.32 MG/DL (ref 0.55–1.02)
DIFFERENTIAL METHOD BLD: ABNORMAL
EKG ATRIAL RATE: 95 BPM
EKG DIAGNOSIS: NORMAL
EKG P AXIS: 32 DEGREES
EKG P-R INTERVAL: 146 MS
EKG Q-T INTERVAL: 360 MS
EKG QRS DURATION: 80 MS
EKG QTC CALCULATION (BAZETT): 452 MS
EKG R AXIS: -14 DEGREES
EKG T AXIS: 46 DEGREES
EKG VENTRICULAR RATE: 95 BPM
EOSINOPHIL # BLD: 0 K/UL (ref 0–0.4)
EOSINOPHIL NFR BLD: 0 % (ref 0–7)
EPITH CASTS URNS QL MICRO: ABNORMAL /LPF
ERYTHROCYTE [DISTWIDTH] IN BLOOD BY AUTOMATED COUNT: 13.9 % (ref 11.5–14.5)
GLOBULIN SER CALC-MCNC: 4.2 G/DL (ref 2–4)
GLUCOSE SERPL-MCNC: 170 MG/DL (ref 65–100)
GLUCOSE UR STRIP.AUTO-MCNC: NEGATIVE MG/DL
HCT VFR BLD AUTO: 37.7 % (ref 35–47)
HGB BLD-MCNC: 12.6 G/DL (ref 11.5–16)
HGB UR QL STRIP: ABNORMAL
HYALINE CASTS URNS QL MICRO: ABNORMAL /LPF (ref 0–2)
IMM GRANULOCYTES # BLD AUTO: 0 K/UL (ref 0–0.04)
IMM GRANULOCYTES NFR BLD AUTO: 0 % (ref 0–0.5)
KETONES UR QL STRIP.AUTO: 15 MG/DL
LEUKOCYTE ESTERASE UR QL STRIP.AUTO: ABNORMAL
LYMPHOCYTES # BLD: 1.2 K/UL (ref 0.8–3.5)
LYMPHOCYTES NFR BLD: 13 % (ref 12–49)
MAGNESIUM SERPL-MCNC: 2 MG/DL (ref 1.6–2.4)
MCH RBC QN AUTO: 27.8 PG (ref 26–34)
MCHC RBC AUTO-ENTMCNC: 33.4 G/DL (ref 30–36.5)
MCV RBC AUTO: 83 FL (ref 80–99)
MONOCYTES # BLD: 0.4 K/UL (ref 0–1)
MONOCYTES NFR BLD: 5 % (ref 5–13)
NEUTS SEG # BLD: 7.6 K/UL (ref 1.8–8)
NEUTS SEG NFR BLD: 82 % (ref 32–75)
NITRITE UR QL STRIP.AUTO: NEGATIVE
NRBC # BLD: 0 K/UL (ref 0–0.01)
NRBC BLD-RTO: 0 PER 100 WBC
PH UR STRIP: 6.5 (ref 5–8)
PLATELET # BLD AUTO: 286 K/UL (ref 150–400)
PMV BLD AUTO: 10.5 FL (ref 8.9–12.9)
POTASSIUM SERPL-SCNC: 3.2 MMOL/L (ref 3.5–5.1)
PROT SERPL-MCNC: 7.9 G/DL (ref 6.4–8.2)
PROT UR STRIP-MCNC: ABNORMAL MG/DL
RBC # BLD AUTO: 4.54 M/UL (ref 3.8–5.2)
RBC #/AREA URNS HPF: >100 /HPF (ref 0–5)
SODIUM SERPL-SCNC: 141 MMOL/L (ref 136–145)
SP GR UR REFRACTOMETRY: 1.01 (ref 1–1.03)
SPECIMEN HOLD: NORMAL
T4 FREE SERPL-MCNC: 1.2 NG/DL (ref 0.8–1.5)
TROPONIN I SERPL HS-MCNC: 11 NG/L (ref 0–51)
TSH SERPL DL<=0.05 MIU/L-ACNC: 1.64 UIU/ML (ref 0.36–3.74)
UROBILINOGEN UR QL STRIP.AUTO: 1 EU/DL (ref 0.2–1)
WBC # BLD AUTO: 9.3 K/UL (ref 3.6–11)
WBC URNS QL MICRO: ABNORMAL /HPF (ref 0–4)

## 2024-04-01 PROCEDURE — 84443 ASSAY THYROID STIM HORMONE: CPT

## 2024-04-01 PROCEDURE — 1100000000 HC RM PRIVATE

## 2024-04-01 PROCEDURE — 80053 COMPREHEN METABOLIC PANEL: CPT

## 2024-04-01 PROCEDURE — 84484 ASSAY OF TROPONIN QUANT: CPT

## 2024-04-01 PROCEDURE — 70450 CT HEAD/BRAIN W/O DYE: CPT

## 2024-04-01 PROCEDURE — 2580000003 HC RX 258: Performed by: HOSPITALIST

## 2024-04-01 PROCEDURE — 93005 ELECTROCARDIOGRAM TRACING: CPT | Performed by: STUDENT IN AN ORGANIZED HEALTH CARE EDUCATION/TRAINING PROGRAM

## 2024-04-01 PROCEDURE — 99285 EMERGENCY DEPT VISIT HI MDM: CPT

## 2024-04-01 PROCEDURE — 36415 COLL VENOUS BLD VENIPUNCTURE: CPT

## 2024-04-01 PROCEDURE — 85025 COMPLETE CBC W/AUTO DIFF WBC: CPT

## 2024-04-01 PROCEDURE — 6370000000 HC RX 637 (ALT 250 FOR IP): Performed by: HOSPITALIST

## 2024-04-01 PROCEDURE — 84439 ASSAY OF FREE THYROXINE: CPT

## 2024-04-01 PROCEDURE — 2580000003 HC RX 258: Performed by: STUDENT IN AN ORGANIZED HEALTH CARE EDUCATION/TRAINING PROGRAM

## 2024-04-01 PROCEDURE — 71046 X-RAY EXAM CHEST 2 VIEWS: CPT

## 2024-04-01 PROCEDURE — 6370000000 HC RX 637 (ALT 250 FOR IP): Performed by: STUDENT IN AN ORGANIZED HEALTH CARE EDUCATION/TRAINING PROGRAM

## 2024-04-01 PROCEDURE — 83735 ASSAY OF MAGNESIUM: CPT

## 2024-04-01 PROCEDURE — 81001 URINALYSIS AUTO W/SCOPE: CPT

## 2024-04-01 PROCEDURE — 94761 N-INVAS EAR/PLS OXIMETRY MLT: CPT

## 2024-04-01 PROCEDURE — 6360000002 HC RX W HCPCS: Performed by: HOSPITALIST

## 2024-04-01 PROCEDURE — 93010 ELECTROCARDIOGRAM REPORT: CPT | Performed by: SPECIALIST

## 2024-04-01 PROCEDURE — 82550 ASSAY OF CK (CPK): CPT

## 2024-04-01 RX ORDER — HEPARIN SODIUM 5000 [USP'U]/ML
5000 INJECTION, SOLUTION INTRAVENOUS; SUBCUTANEOUS 2 TIMES DAILY
Status: DISCONTINUED | OUTPATIENT
Start: 2024-04-01 | End: 2024-04-05 | Stop reason: HOSPADM

## 2024-04-01 RX ORDER — ONDANSETRON 4 MG/1
4 TABLET, ORALLY DISINTEGRATING ORAL EVERY 8 HOURS PRN
Status: DISCONTINUED | OUTPATIENT
Start: 2024-04-01 | End: 2024-04-05 | Stop reason: HOSPADM

## 2024-04-01 RX ORDER — CLONAZEPAM 0.5 MG/1
0.5 TABLET ORAL 2 TIMES DAILY
Status: DISCONTINUED | OUTPATIENT
Start: 2024-04-01 | End: 2024-04-03

## 2024-04-01 RX ORDER — 0.9 % SODIUM CHLORIDE 0.9 %
1000 INTRAVENOUS SOLUTION INTRAVENOUS ONCE
Status: COMPLETED | OUTPATIENT
Start: 2024-04-01 | End: 2024-04-01

## 2024-04-01 RX ORDER — BUSPIRONE HYDROCHLORIDE 5 MG/1
5 TABLET ORAL 3 TIMES DAILY
Status: DISCONTINUED | OUTPATIENT
Start: 2024-04-01 | End: 2024-04-04

## 2024-04-01 RX ORDER — DONEPEZIL HYDROCHLORIDE 5 MG/1
5 TABLET, FILM COATED ORAL NIGHTLY
Status: DISCONTINUED | OUTPATIENT
Start: 2024-04-01 | End: 2024-04-05 | Stop reason: HOSPADM

## 2024-04-01 RX ORDER — ACETAMINOPHEN 650 MG/1
650 SUPPOSITORY RECTAL EVERY 6 HOURS PRN
Status: DISCONTINUED | OUTPATIENT
Start: 2024-04-01 | End: 2024-04-05 | Stop reason: HOSPADM

## 2024-04-01 RX ORDER — HYDRALAZINE HYDROCHLORIDE 20 MG/ML
5 INJECTION INTRAMUSCULAR; INTRAVENOUS EVERY 6 HOURS PRN
Status: DISCONTINUED | OUTPATIENT
Start: 2024-04-01 | End: 2024-04-05 | Stop reason: HOSPADM

## 2024-04-01 RX ORDER — SODIUM CHLORIDE 9 MG/ML
INJECTION, SOLUTION INTRAVENOUS PRN
Status: DISCONTINUED | OUTPATIENT
Start: 2024-04-01 | End: 2024-04-05 | Stop reason: HOSPADM

## 2024-04-01 RX ORDER — AMLODIPINE BESYLATE 5 MG/1
5 TABLET ORAL DAILY
Status: DISCONTINUED | OUTPATIENT
Start: 2024-04-02 | End: 2024-04-05 | Stop reason: HOSPADM

## 2024-04-01 RX ORDER — POLYETHYLENE GLYCOL 3350 17 G/17G
17 POWDER, FOR SOLUTION ORAL DAILY PRN
Status: DISCONTINUED | OUTPATIENT
Start: 2024-04-01 | End: 2024-04-05 | Stop reason: HOSPADM

## 2024-04-01 RX ORDER — ALPRAZOLAM 0.5 MG/1
0.5 TABLET ORAL 3 TIMES DAILY PRN
Status: DISCONTINUED | OUTPATIENT
Start: 2024-04-01 | End: 2024-04-04

## 2024-04-01 RX ORDER — SODIUM CHLORIDE 0.9 % (FLUSH) 0.9 %
5-40 SYRINGE (ML) INJECTION PRN
Status: DISCONTINUED | OUTPATIENT
Start: 2024-04-01 | End: 2024-04-05 | Stop reason: HOSPADM

## 2024-04-01 RX ORDER — POTASSIUM CHLORIDE 750 MG/1
40 TABLET, FILM COATED, EXTENDED RELEASE ORAL ONCE
Status: COMPLETED | OUTPATIENT
Start: 2024-04-01 | End: 2024-04-01

## 2024-04-01 RX ORDER — ACETAMINOPHEN 500 MG
500 TABLET ORAL EVERY 4 HOURS PRN
Status: DISCONTINUED | OUTPATIENT
Start: 2024-04-01 | End: 2024-04-01 | Stop reason: SDUPTHER

## 2024-04-01 RX ORDER — ASPIRIN 81 MG/1
81 TABLET, CHEWABLE ORAL DAILY
Status: DISCONTINUED | OUTPATIENT
Start: 2024-04-02 | End: 2024-04-05 | Stop reason: HOSPADM

## 2024-04-01 RX ORDER — SODIUM CHLORIDE 0.9 % (FLUSH) 0.9 %
5-40 SYRINGE (ML) INJECTION EVERY 12 HOURS SCHEDULED
Status: DISCONTINUED | OUTPATIENT
Start: 2024-04-01 | End: 2024-04-05 | Stop reason: HOSPADM

## 2024-04-01 RX ORDER — SODIUM CHLORIDE 9 MG/ML
INJECTION, SOLUTION INTRAVENOUS CONTINUOUS
Status: DISPENSED | OUTPATIENT
Start: 2024-04-01 | End: 2024-04-03

## 2024-04-01 RX ORDER — MEMANTINE HYDROCHLORIDE 10 MG/1
5 TABLET ORAL 2 TIMES DAILY
Status: DISCONTINUED | OUTPATIENT
Start: 2024-04-01 | End: 2024-04-05 | Stop reason: HOSPADM

## 2024-04-01 RX ORDER — ACETAMINOPHEN 325 MG/1
650 TABLET ORAL EVERY 6 HOURS PRN
Status: DISCONTINUED | OUTPATIENT
Start: 2024-04-01 | End: 2024-04-05 | Stop reason: HOSPADM

## 2024-04-01 RX ORDER — ONDANSETRON 2 MG/ML
4 INJECTION INTRAMUSCULAR; INTRAVENOUS EVERY 6 HOURS PRN
Status: DISCONTINUED | OUTPATIENT
Start: 2024-04-01 | End: 2024-04-05 | Stop reason: HOSPADM

## 2024-04-01 RX ORDER — PANTOPRAZOLE SODIUM 40 MG/1
40 TABLET, DELAYED RELEASE ORAL
Status: DISCONTINUED | OUTPATIENT
Start: 2024-04-02 | End: 2024-04-05 | Stop reason: HOSPADM

## 2024-04-01 RX ORDER — DOCUSATE SODIUM 100 MG/1
100 CAPSULE, LIQUID FILLED ORAL 2 TIMES DAILY
Status: DISCONTINUED | OUTPATIENT
Start: 2024-04-01 | End: 2024-04-05 | Stop reason: HOSPADM

## 2024-04-01 RX ADMIN — DOCUSATE SODIUM 100 MG: 100 CAPSULE, LIQUID FILLED ORAL at 21:11

## 2024-04-01 RX ADMIN — DONEPEZIL HYDROCHLORIDE 5 MG: 5 TABLET, FILM COATED ORAL at 21:11

## 2024-04-01 RX ADMIN — HEPARIN SODIUM 5000 UNITS: 5000 INJECTION INTRAVENOUS; SUBCUTANEOUS at 21:12

## 2024-04-01 RX ADMIN — SODIUM CHLORIDE: 9 INJECTION, SOLUTION INTRAVENOUS at 21:43

## 2024-04-01 RX ADMIN — CLONAZEPAM 0.5 MG: 0.5 TABLET ORAL at 21:11

## 2024-04-01 RX ADMIN — SODIUM CHLORIDE, PRESERVATIVE FREE 10 ML: 5 INJECTION INTRAVENOUS at 21:12

## 2024-04-01 RX ADMIN — SODIUM CHLORIDE 1000 ML: 9 INJECTION, SOLUTION INTRAVENOUS at 15:07

## 2024-04-01 RX ADMIN — POTASSIUM CHLORIDE 40 MEQ: 750 TABLET, EXTENDED RELEASE ORAL at 15:06

## 2024-04-01 RX ADMIN — BUSPIRONE HYDROCHLORIDE 5 MG: 5 TABLET ORAL at 21:11

## 2024-04-01 RX ADMIN — MEMANTINE 5 MG: 10 TABLET ORAL at 21:39

## 2024-04-01 NOTE — ED PROVIDER NOTES
of Care: telemetry  Department: [facility] - [phone]  88 y.o. female found with decreased responsiveness today, elevated CK, confusion. Recent admit for stroke like symptoms but negative workup.         (Please note that portions of this note were completed with a voice recognition program.  Efforts were made to edit the dictations but occasionally words are mis-transcribed.)    Behzad Caldwell DO (electronically signed)  Emergency Attending Physician               Behzad Caldwell DO  04/01/24 9998

## 2024-04-01 NOTE — ED TRIAGE NOTES
Pt arrives to the ER via EM from [facility] for complaints of generalized weakness.  EMS reports that staff found patient face down in bed for unknown amount of time.     EMS reports that patient was also seen over the weekend for a fall with an injury to right wrist.

## 2024-04-01 NOTE — H&P
Hospitalist Admission Note      NAME:  Yumi Sandoval   :  1935   MRN:  [id number]     Date/Time:  2024 3:32 PM    Patient PCP: Debbie Brennan MD    ________________________________________________________________________    Given the patient's current clinical presentation, I have a high level of concern for decompensation if discharged from the emergency department.  Complex decision making was performed, which includes reviewing the patient's available past medical records, laboratory results, and x-ray films.       My assessment of this patient's clinical condition and my plan of care is as follows.    Assessment / Plan:  Patient is a 88-year-old female with history of dementia, hypertension, anxiety, rhabdomyolysis and depression comes to the hospital with generalized weakness, TAMAR, hypokalemia.    1.  Generalized weakness  Probably related to progression of her dementia.  As per the son she is getting a physical therapy at her memory care unit but not intensive.  She may need intensive physical therapy.  We will correct the dehydration and replace potassium.  Patient also has rhabdomyolysis and we will repeat CPK level tomorrow morning.  Patient was recently started on Lipitor on 3/28/2024 which I will hold for now.  Check UA  Patient was recently admitted to this hospital and had an MRI and EEG of the brain which was unremarkable.  PT OT evaluation.    2.  Hypertension  Continue Norvasc at this time.    3.  Anxiety and depression  Continue buspirone, Klonopin and Paxil.    4.  Dementia  Patient lives in a memory care unit.  Continue Namenda and Aricept.    5.  Hypokalemia  Replace potassium.    6.  TAMAR  IV fluids, avoid nephrotoxic medications.    7.  Hyperlipidemia  On Lipitor  Hold statins at this time due to rhabdomyolysis.           I have personally reviewed the radiographs, laboratory data in Epic and decisions and statements above are based partially on this personal  Plan discussed with:    Comments   Patient x Discussed with patient in room. POC outlined and Questions answered    Family      RN x    Care Manager                    Consultant:  carrie MONSALVE MD   _______________________________________________________________________  Recommended Disposition:   Home with Family x   HH/PT/OT/RN    SNF/LTC    [facility]    ________________________________________________________________________  TOTAL TIME: 65 minutes        Comments   >50% of visit spent in counseling and coordination of care  Chart reviewed  Discussion with patient and/or family and questions answered     ________________________________________________________________________  Signed: Miguelina Barry MD        Procedures: see electronic medical records for all procedures/Xrays/labs and details which were not copied into this note but were reviewed prior to creation of Plan.

## 2024-04-02 LAB
ANION GAP SERPL CALC-SCNC: 4 MMOL/L (ref 5–15)
BASOPHILS # BLD: 0 K/UL (ref 0–0.1)
BASOPHILS NFR BLD: 0 % (ref 0–1)
BUN SERPL-MCNC: 20 MG/DL (ref 6–20)
BUN/CREAT SERPL: 23 (ref 12–20)
CALCIUM SERPL-MCNC: 8.9 MG/DL (ref 8.5–10.1)
CHLORIDE SERPL-SCNC: 111 MMOL/L (ref 97–108)
CO2 SERPL-SCNC: 26 MMOL/L (ref 21–32)
CREAT SERPL-MCNC: 0.87 MG/DL (ref 0.55–1.02)
DIFFERENTIAL METHOD BLD: NORMAL
EOSINOPHIL # BLD: 0 K/UL (ref 0–0.4)
EOSINOPHIL NFR BLD: 1 % (ref 0–7)
ERYTHROCYTE [DISTWIDTH] IN BLOOD BY AUTOMATED COUNT: 14 % (ref 11.5–14.5)
GLUCOSE SERPL-MCNC: 118 MG/DL (ref 65–100)
HCT VFR BLD AUTO: 35.8 % (ref 35–47)
HGB BLD-MCNC: 11.6 G/DL (ref 11.5–16)
IMM GRANULOCYTES # BLD AUTO: 0 K/UL (ref 0–0.04)
IMM GRANULOCYTES NFR BLD AUTO: 0 % (ref 0–0.5)
LYMPHOCYTES # BLD: 1.9 K/UL (ref 0.8–3.5)
LYMPHOCYTES NFR BLD: 25 % (ref 12–49)
MCH RBC QN AUTO: 27.4 PG (ref 26–34)
MCHC RBC AUTO-ENTMCNC: 32.4 G/DL (ref 30–36.5)
MCV RBC AUTO: 84.6 FL (ref 80–99)
MONOCYTES # BLD: 0.5 K/UL (ref 0–1)
MONOCYTES NFR BLD: 7 % (ref 5–13)
NEUTS SEG # BLD: 5.3 K/UL (ref 1.8–8)
NEUTS SEG NFR BLD: 67 % (ref 32–75)
NRBC # BLD: 0 K/UL (ref 0–0.01)
NRBC BLD-RTO: 0 PER 100 WBC
PLATELET # BLD AUTO: 283 K/UL (ref 150–400)
PMV BLD AUTO: 10.6 FL (ref 8.9–12.9)
POTASSIUM SERPL-SCNC: 3.8 MMOL/L (ref 3.5–5.1)
RBC # BLD AUTO: 4.23 M/UL (ref 3.8–5.2)
SODIUM SERPL-SCNC: 141 MMOL/L (ref 136–145)
WBC # BLD AUTO: 7.9 K/UL (ref 3.6–11)

## 2024-04-02 PROCEDURE — 6370000000 HC RX 637 (ALT 250 FOR IP): Performed by: HOSPITALIST

## 2024-04-02 PROCEDURE — 97535 SELF CARE MNGMENT TRAINING: CPT

## 2024-04-02 PROCEDURE — 6360000002 HC RX W HCPCS: Performed by: HOSPITALIST

## 2024-04-02 PROCEDURE — 85025 COMPLETE CBC W/AUTO DIFF WBC: CPT

## 2024-04-02 PROCEDURE — 80048 BASIC METABOLIC PNL TOTAL CA: CPT

## 2024-04-02 PROCEDURE — 97165 OT EVAL LOW COMPLEX 30 MIN: CPT

## 2024-04-02 PROCEDURE — 36415 COLL VENOUS BLD VENIPUNCTURE: CPT

## 2024-04-02 PROCEDURE — 94761 N-INVAS EAR/PLS OXIMETRY MLT: CPT

## 2024-04-02 PROCEDURE — 97162 PT EVAL MOD COMPLEX 30 MIN: CPT

## 2024-04-02 PROCEDURE — 97530 THERAPEUTIC ACTIVITIES: CPT

## 2024-04-02 PROCEDURE — 1100000000 HC RM PRIVATE

## 2024-04-02 RX ADMIN — BUSPIRONE HYDROCHLORIDE 5 MG: 5 TABLET ORAL at 12:04

## 2024-04-02 RX ADMIN — BUSPIRONE HYDROCHLORIDE 5 MG: 5 TABLET ORAL at 09:27

## 2024-04-02 RX ADMIN — MEMANTINE 5 MG: 10 TABLET ORAL at 10:48

## 2024-04-02 RX ADMIN — MEMANTINE 5 MG: 10 TABLET ORAL at 22:56

## 2024-04-02 RX ADMIN — HEPARIN SODIUM 5000 UNITS: 5000 INJECTION INTRAVENOUS; SUBCUTANEOUS at 09:26

## 2024-04-02 RX ADMIN — AMLODIPINE BESYLATE 5 MG: 5 TABLET ORAL at 09:26

## 2024-04-02 RX ADMIN — PANTOPRAZOLE SODIUM 40 MG: 40 TABLET, DELAYED RELEASE ORAL at 06:44

## 2024-04-02 RX ADMIN — BUSPIRONE HYDROCHLORIDE 5 MG: 5 TABLET ORAL at 22:56

## 2024-04-02 RX ADMIN — HEPARIN SODIUM 5000 UNITS: 5000 INJECTION INTRAVENOUS; SUBCUTANEOUS at 22:56

## 2024-04-02 RX ADMIN — DOCUSATE SODIUM 100 MG: 100 CAPSULE, LIQUID FILLED ORAL at 22:56

## 2024-04-02 RX ADMIN — DOCUSATE SODIUM 100 MG: 100 CAPSULE, LIQUID FILLED ORAL at 09:26

## 2024-04-02 RX ADMIN — CLONAZEPAM 0.5 MG: 0.5 TABLET ORAL at 09:26

## 2024-04-02 RX ADMIN — DONEPEZIL HYDROCHLORIDE 5 MG: 5 TABLET, FILM COATED ORAL at 22:56

## 2024-04-02 RX ADMIN — CLONAZEPAM 0.5 MG: 0.5 TABLET ORAL at 22:56

## 2024-04-02 RX ADMIN — ALPRAZOLAM 0.5 MG: 0.5 TABLET ORAL at 06:43

## 2024-04-02 RX ADMIN — ASPIRIN 81 MG: 81 TABLET, CHEWABLE ORAL at 09:26

## 2024-04-02 RX ADMIN — ACETAMINOPHEN 650 MG: 325 TABLET ORAL at 06:43

## 2024-04-02 RX ADMIN — HYDRALAZINE HYDROCHLORIDE 5 MG: 20 INJECTION, SOLUTION INTRAMUSCULAR; INTRAVENOUS at 04:44

## 2024-04-02 ASSESSMENT — PAIN DESCRIPTION - ORIENTATION: ORIENTATION: RIGHT

## 2024-04-02 ASSESSMENT — PAIN DESCRIPTION - LOCATION: LOCATION: ARM

## 2024-04-02 ASSESSMENT — PAIN SCALES - GENERAL: PAINLEVEL_OUTOF10: 7

## 2024-04-02 ASSESSMENT — PAIN DESCRIPTION - DESCRIPTORS: DESCRIPTORS: ACHING

## 2024-04-02 NOTE — PROGRESS NOTES
Report received patient laying in bed. Pt confused keeps repeating the same questions     2111  Pt given med without difficulty    2143  IVF hung     0000  Rounds made no distress noted     0312  Rounds made pt continues to be confused asks the same questions over and over no distress noted at this time     0444  Pt given IV Hydralazine     0510  Pt assisted to BSC with two people     0644  Pt given med without difficulty    0735  Report given to on coming nurse Ashley and student nurse  including SBAR opportunity presented to ask questions

## 2024-04-02 NOTE — PLAN OF CARE
Problem: Physical Therapy - Adult  Goal: By Discharge: Performs mobility at highest level of function for planned discharge setting.  See evaluation for individualized goals.  Description:  FUNCTIONAL STATUS PRIOR TO ADMISSION: Patient was ambulatory and active without use of DME in memory care unit. Multiple hospital admissions in the past week, 3/25 - 3/28; 3/30 & 4/1.     HOME SUPPORT PRIOR TO ADMISSION: The patient lived with staff at [facility] care unit. Son reports multiple ground level falls since 3/28       Physical Therapy Goals  Initiated 4/2/2024  1.  Patient will move from supine to sit and sit to supine, scoot up and down, and roll side to side in bed with supervision/set-up within 7 day(s).    2.  Patient will perform sit to stand with contact guard assist within 7 day(s).  3.  Patient will transfer from bed to chair and chair to bed with contact guard assist using the least restrictive device within 7 day(s).  4.  Patient will ambulate with contact guard assist for 100 feet with the least restrictive device within 7 day(s).       Outcome: Progressing   PHYSICAL THERAPY EVALUATION    Patient: Yumi Sandoval (88 y.o. female)  Date: 4/2/2024  Primary Diagnosis: Rhabdomyolysis [M62.82]  Generalized weakness [R53.1]  TAMAR (acute kidney injury) (HCC) [N17.9]  Frequent falls [R29.6]  Non-traumatic rhabdomyolysis [M62.82]       Precautions: Restrictions/Precautions: Fall Risk                      ASSESSMENT :   DEFICITS/IMPAIRMENTS:   The patient is limited by decreased functional mobility, independence in ADLs, strength, activity tolerance, coordination, balance in the setting of hospital re-admission for weakness, increased falls and rhabdomyolysis. Patient is known to this PT from last admission earlier this week. Patient presents with left sided lip depression, erased with smiling, similar to last admission. She also has propensity to keep her gaze to the right side today, similar to last evaluation as well  decreased, functional       Functional Mobility:  Bed Mobility:     Bed Mobility Training  Bed Mobility Training: Yes  Rolling: Minimum assistance;Additional time  Supine to Sit: Minimum assistance;Assist X2;Additional time  Sit to Supine: Moderate assistance;Assist X2 (hindered by short stature on ED stretcher)  Scooting: Contact-guard assistance  Transfers:     Transfer Training  Transfer Training: Yes  Sit to Stand: Minimum assistance;Assist X1  Stand to Sit: Minimum assistance;Assist X2 (hindered by short stature on ED stretcher)  Balance:               Balance  Sitting: Impaired  Sitting - Static: Good (unsupported)  Sitting - Dynamic: Fair (occasional)  Standing: Impaired  Standing - Static: Constant support;Good  Standing - Dynamic: Constant support;Poor  Ambulation/Gait Training:                       Gait  Overall Level of Assistance: Minimum assistance;Assist X1;Moderate assistance  Distance (ft): 4 Feet  Assistive Device: Gait belt (HHA x 1-2)  Interventions: Manual cues;Verbal cues  Base of Support: Narrowed  Gait Abnormalities: Shuffling gait;Decreased step clearance (+ one major loss of balance)                                                                                                                                                                                                                                                          [facility] AM-PAC®      Basic Mobility Inpatient Short Form (6-Clicks) Version 2  How much HELP from another person do you currently need... (If the patient hasn't done an activity recently, how much help from another person do you think they would need if they tried?) Total A Lot A Little None   1.  Turning from your back to your side while in a flat bed without using bedrails? []  1 []  2 [x]  3  []  4   2.  Moving from lying on your back to sitting on the side of a flat bed without using bedrails? []  1 []  2 [x]  3  []  4   3.  Moving to and from a bed to

## 2024-04-02 NOTE — CARE COORDINATION
Initial Case Management Assessment         04/02/24 1958   Service Assessment   Patient Orientation Unable to Assess   Cognition Dementia / Early Alzheimer's   History Provided By Child/Family  (son Dixon [phone])   Primary Caregiver Other (Comment)  ([facility] staff)   Support Systems Children;Other (Comment)  ([facility] staff)   Patient's Healthcare Decision Maker is: Named in Scanned ACP Document   PCP Verified by CM Yes  (Dr. Brennan &  @ [facility])   Last Visit to PCP Within last 3 months   Prior Functional Level Assistance with the following:;Housework;Cooking;Other (see comment);Mobility  (medications)   Can patient return to prior living arrangement Unknown at present  (PT/OT eval pending)   Would you like for me to discuss the discharge plan with any other family members/significant others, and if so, who? Yes  (kristin Metcalf)   Financial Resources Medicare   Community Resources Assisted Living   Social/Functional History   Lives With Other (comment)  ([facility] staff)   Type of Home Assisted living   Home Layout One level   Home Access Level entry   Bathroom Shower/Tub Walk-in shower   Bathroom Toilet Handicap height   Bathroom Equipment Grab bars in shower;Built-in shower seat;Grab bars around toilet   Home Equipment Cane;Walker, rolling   ADL Assistance Independent   Homemaking Assistance Needs assistance   Homemaking Responsibilities No   Active  Yes   Patient's  Info family or [facility] shuttle   Occupation Retired   Discharge Planning   Type of Residence Assisted living   Living Arrangements Other (Comment)  ([facility] staff)   Current Services Prior To Admission Other (Comment)  (HH PT/OT)   Potential Assistance Needed Other (Comment)  (PT/OT eval pending)   DME Ordered? No   Potential Assistance Purchasing Medications No   Patient expects to be discharged to:   (TBD. PT/OT eval pending)   Services At/After Discharge   [place] Resource Information Provided? No   Mode of Transport at Discharge Other (see

## 2024-04-02 NOTE — PLAN OF CARE
Problem: Occupational Therapy - Adult  Goal: By Discharge: Performs self-care activities at highest level of function for planned discharge setting.  See evaluation for individualized goals.  Description: FUNCTIONAL STATUS PRIOR TO ADMISSION: Patient was ambulatory and active without use of DME in memory care unit. Multiple hospital admissions in the past week, 3/25 - 3/28; 3/30 & 4/1.     HOME SUPPORT PRIOR TO ADMISSION: The patient lived with staff at [facility] unit. Son reports multiple ground level falls since 3/28           Occupational Therapy Goals:  Initiated 4/2/2024  1.  Patient will perform lower body dressing with Minimal Assist within 7 day(s).  2.  Patient will perform grooming, seated EOB,  with Supervision within 7 day(s).  3.  Patient will perform toilet transfers with Minimal Assist  within 7 day(s).  4.  Patient will perform all aspects of toileting with Minimal Assist within 7 day(s).  5.  Patient will participate in upper extremity therapeutic exercise/activities with Supervision for 10 minutes within 7 day(s).    6  Patient will utilize energy conservation techniques during functional activities with verbal cues within 7 day(s).    Outcome: Progressing   OCCUPATIONAL THERAPY EVALUATION    Patient: Yumi Sandoval (88 y.o. female)  Date: 4/2/2024  Primary Diagnosis: Rhabdomyolysis [M62.82]  Generalized weakness [R53.1]  TAMAR (acute kidney injury) (HCC) [N17.9]  Frequent falls [R29.6]  Non-traumatic rhabdomyolysis [M62.82]         Precautions: Fall Risk                ASSESSMENT :  The patient is limited by decreased functional mobility, independence in ADLs, ROM, strength, activity tolerance, endurance, cognition, coordination, balance, posture following admission for generalized weakness and rhabdomyolysis.  Patient received in bed, agreeable to activity.  Patient with right side gaze preference but able to look to left side with cues.   Patient with decreased balance in sitting and standing,  person  Cognition  Overall Cognitive Status: Exceptions  Arousal/Alertness: Appropriate responses to stimuli  Attention Span: Attends with cues to redirect  Memory: Decreased short term memory;Decreased recall of recent events  Safety Judgement: Decreased awareness of need for assistance  Problem Solving: Decreased awareness of errors  Insights: Decreased awareness of deficits  Initiation: Requires cues for some  Sequencing: Requires cues for some    Skin: intact as seen    Edema: none noted     Hearing:        Vision/Perceptual:                  Range of Motion:   AROM: Generally decreased, functional         Strength:  Strength: Generally decreased, functional      Coordination:  Coordination: Generally decreased, functional     Coordination: Generally decreased, functional      Tone & Sensation:   Tone: Normal  Sensation: Intact          Functional Mobility and Transfers for ADLs:    Bed Mobility:     Bed Mobility Training  Bed Mobility Training: Yes  Rolling: Minimum assistance;Additional time  Supine to Sit: Minimum assistance;Assist X2;Additional time  Sit to Supine: Moderate assistance;Assist X2 (hindered by short stature on ED stretcher)  Scooting: Contact-guard assistance    Transfers:      Transfer Training  Transfer Training: Yes  Sit to Stand: Minimum assistance;Assist X1  Stand to Sit: Minimum assistance;Assist X2 (hindered by short stature on ED stretcher)                     Balance:   Standing: Impaired  Balance  Sitting: Impaired  Sitting - Static: Good (unsupported)  Sitting - Dynamic: Fair (occasional)  Standing: Impaired  Standing - Static: Constant support;Good  Standing - Dynamic: Constant support;Poor      ADL Assessment:          Feeding: Minimal assistance       Grooming: Minimal assistance       UE Bathing: Minimal assistance       Skin Care: Bath wipes;Chlorhexidine wipes    LE Bathing: Moderate assistance       UE Dressing: Minimal assistance       LE Dressing: Moderate assistance

## 2024-04-02 NOTE — PROGRESS NOTES
[facility]  [address] [place], [place] [id number] [phone]    [facility]                                                                                          Hospitalist Progress Note  Diana Massey MD        Date of Service:  2024  NAME:  Yumi Sandoval  :  1935  MRN:  [id number]      Admission Summary:   88-year-old female presented to the ED with generalized weakness and multiple falls    Interval history / Subjective:   Appears confused at this time asking what her hospital bands are for     Assessment & Plan:     Generalized weakness  -Suspect progression of dementia  -She currently lives at a memory care unit and is getting some PT there but does not feel that it is intense enough  -PT/OT evaluated here and recommended SNF once stable    Rhabdomyolysis  -Patient was found facedown on her bed for an unknown amount of time  -Repeat CK in the morning  -Hold Lipitor for now    TAMAR  -Improving with IV fluids    Hypertension  -Continue Norvasc    Anxiety and depression  -Continue BuSpar, Klonopin, Paxil    Dementia  -Resides in memory care  -Continue Namenda and Aricept    Hypokalemia  -Replete and monitor        Outisde Records, prior notes, labs, radiology, and medications reviewed     Code status: Full code  DVT prophylaxis: Heparin       Hospital Problems             Last Modified POA    * (Principal) Rhabdomyolysis 2024 Yes          Review of Systems:   Pertinent items are noted in HPI.       Vital Signs:    Last 24hrs VS reviewed since prior progress note. Most recent are:  Vitals:    24 1413   BP: (!) 163/75   Pulse: 93   Resp: 17   Temp: 98.2 °F (36.8 °C)   SpO2: 96%         Intake/Output Summary (Last 24 hours) at 2024 1455  Last data filed at 2024 0314  Gross per 24 hour   Intake --   Output 450 ml   Net -450 ml        Physical Examination:             Constitutional:  No acute distress,

## 2024-04-03 LAB
APPEARANCE UR: CLEAR
BACTERIA URNS QL MICRO: NEGATIVE /HPF
BILIRUB UR QL: NEGATIVE
COLOR UR: ABNORMAL
EPITH CASTS URNS QL MICRO: ABNORMAL /LPF
GLUCOSE UR STRIP.AUTO-MCNC: NEGATIVE MG/DL
HGB UR QL STRIP: NEGATIVE
KETONES UR QL STRIP.AUTO: 40 MG/DL
LEUKOCYTE ESTERASE UR QL STRIP.AUTO: ABNORMAL
NITRITE UR QL STRIP.AUTO: NEGATIVE
PH UR STRIP: 6 (ref 5–8)
PROT UR STRIP-MCNC: NEGATIVE MG/DL
RBC #/AREA URNS HPF: ABNORMAL /HPF (ref 0–5)
SP GR UR REFRACTOMETRY: 1.01 (ref 1–1.03)
SPECIMEN HOLD: NORMAL
URINE CULTURE IF INDICATED: ABNORMAL
UROBILINOGEN UR QL STRIP.AUTO: 1 EU/DL (ref 0.2–1)
WBC URNS QL MICRO: ABNORMAL /HPF (ref 0–4)

## 2024-04-03 PROCEDURE — 81001 URINALYSIS AUTO W/SCOPE: CPT

## 2024-04-03 PROCEDURE — 97530 THERAPEUTIC ACTIVITIES: CPT

## 2024-04-03 PROCEDURE — 2580000003 HC RX 258: Performed by: FAMILY MEDICINE

## 2024-04-03 PROCEDURE — 6370000000 HC RX 637 (ALT 250 FOR IP): Performed by: HOSPITALIST

## 2024-04-03 PROCEDURE — 97535 SELF CARE MNGMENT TRAINING: CPT

## 2024-04-03 PROCEDURE — 6360000002 HC RX W HCPCS: Performed by: HOSPITALIST

## 2024-04-03 PROCEDURE — 2580000003 HC RX 258: Performed by: HOSPITALIST

## 2024-04-03 PROCEDURE — 94761 N-INVAS EAR/PLS OXIMETRY MLT: CPT

## 2024-04-03 PROCEDURE — 51798 US URINE CAPACITY MEASURE: CPT

## 2024-04-03 PROCEDURE — 2060000000 HC ICU INTERMEDIATE R&B

## 2024-04-03 RX ORDER — 0.9 % SODIUM CHLORIDE 0.9 %
500 INTRAVENOUS SOLUTION INTRAVENOUS ONCE
Status: COMPLETED | OUTPATIENT
Start: 2024-04-03 | End: 2024-04-03

## 2024-04-03 RX ORDER — CLONAZEPAM 0.5 MG/1
0.5 TABLET ORAL DAILY
Status: DISCONTINUED | OUTPATIENT
Start: 2024-04-04 | End: 2024-04-05

## 2024-04-03 RX ADMIN — SODIUM CHLORIDE, PRESERVATIVE FREE 10 ML: 5 INJECTION INTRAVENOUS at 20:30

## 2024-04-03 RX ADMIN — HEPARIN SODIUM 5000 UNITS: 5000 INJECTION INTRAVENOUS; SUBCUTANEOUS at 09:47

## 2024-04-03 RX ADMIN — CLONAZEPAM 0.5 MG: 0.5 TABLET ORAL at 09:47

## 2024-04-03 RX ADMIN — BUSPIRONE HYDROCHLORIDE 5 MG: 5 TABLET ORAL at 14:55

## 2024-04-03 RX ADMIN — DOCUSATE SODIUM 100 MG: 100 CAPSULE, LIQUID FILLED ORAL at 09:47

## 2024-04-03 RX ADMIN — SODIUM CHLORIDE: 9 INJECTION, SOLUTION INTRAVENOUS at 00:13

## 2024-04-03 RX ADMIN — HEPARIN SODIUM 5000 UNITS: 5000 INJECTION INTRAVENOUS; SUBCUTANEOUS at 20:30

## 2024-04-03 RX ADMIN — BUSPIRONE HYDROCHLORIDE 5 MG: 5 TABLET ORAL at 20:30

## 2024-04-03 RX ADMIN — MEMANTINE 5 MG: 10 TABLET ORAL at 09:47

## 2024-04-03 RX ADMIN — DOCUSATE SODIUM 100 MG: 100 CAPSULE, LIQUID FILLED ORAL at 20:30

## 2024-04-03 RX ADMIN — MEMANTINE 5 MG: 10 TABLET ORAL at 20:30

## 2024-04-03 RX ADMIN — PANTOPRAZOLE SODIUM 40 MG: 40 TABLET, DELAYED RELEASE ORAL at 08:02

## 2024-04-03 RX ADMIN — SODIUM CHLORIDE 500 ML: 9 INJECTION, SOLUTION INTRAVENOUS at 13:30

## 2024-04-03 RX ADMIN — ASPIRIN 81 MG: 81 TABLET, CHEWABLE ORAL at 09:47

## 2024-04-03 RX ADMIN — DONEPEZIL HYDROCHLORIDE 5 MG: 5 TABLET, FILM COATED ORAL at 20:30

## 2024-04-03 RX ADMIN — BUSPIRONE HYDROCHLORIDE 5 MG: 5 TABLET ORAL at 09:47

## 2024-04-03 RX ADMIN — HYDRALAZINE HYDROCHLORIDE 5 MG: 20 INJECTION, SOLUTION INTRAMUSCULAR; INTRAVENOUS at 00:17

## 2024-04-03 RX ADMIN — AMLODIPINE BESYLATE 5 MG: 5 TABLET ORAL at 09:47

## 2024-04-03 ASSESSMENT — PAIN SCALES - WONG BAKER
WONGBAKER_NUMERICALRESPONSE: NO HURT
WONGBAKER_NUMERICALRESPONSE: NO HURT

## 2024-04-03 ASSESSMENT — PAIN SCALES - GENERAL: PAINLEVEL_OUTOF10: 0

## 2024-04-03 NOTE — PROGRESS NOTES
Pt noted to be retaining per bladder scanner 975 ml  at around 1210pm today. This nurse and RRT RN attempted to straight cath pt per orders  x 3 times with no success. Diana Agduelo notified of this. Pt HR noted to be in the 130's laying in bed at around 1:12 pm today 4/03/2024, pt slightly agitated and stating \" I need to go to the bathroom\" .MRI of the brain ordered and questionnaire completed by this nurse and son by telephone , 500 ml Bolus given per orders  Physical therapy in room at this time and helped pt go to bedside commode, pt did pee. Bladder scanned pt at this time after voiding showing 780 at this time. Per Physical therapy pt noted to change and at this time pt does not walk when at baseline pt did use to walk, changes also notified to Diana Agudelo. Urology at this time on the floor about to perform cath x 1 . Plan of care ongoing.

## 2024-04-03 NOTE — PROGRESS NOTES
1846: messaged provider asked if she wanted scds on patient and NIHs since we ae ruling out stroke she advised yes. Will place orders     1925: Bedside and Verbal shift change report given to Reynaldo RN (oncoming nurse) by Jonas RN (offgoing nurse). Report included the following information Adult Overview, Recent Results, Med Rec Status, and Cardiac Rhythm SR to Mike .

## 2024-04-03 NOTE — PLAN OF CARE
Problem: Physical Therapy - Adult  Goal: By Discharge: Performs mobility at highest level of function for planned discharge setting.  See evaluation for individualized goals.  Description:  FUNCTIONAL STATUS PRIOR TO ADMISSION: Patient was ambulatory and active without use of DME in memory care unit. Multiple hospital admissions in the past week, 3/25 - 3/28; 3/30 & 4/1.     HOME SUPPORT PRIOR TO ADMISSION: The patient lived with staff at memory care unit. Son reports multiple ground level falls since 3/28       Physical Therapy Goals  Initiated 4/2/2024  1.  Patient will move from supine to sit and sit to supine, scoot up and down, and roll side to side in bed with supervision/set-up within 7 day(s).    2.  Patient will perform sit to stand with contact guard assist within 7 day(s).  3.  Patient will transfer from bed to chair and chair to bed with contact guard assist using the least restrictive device within 7 day(s).  4.  Patient will ambulate with contact guard assist for 100 feet with the least restrictive device within 7 day(s).       Outcome: Progressing   PHYSICAL THERAPY TREATMENT    Patient: Yumi Sandoval (88 y.o. female)  Date: 4/3/2024  Diagnosis: Rhabdomyolysis [M62.82]  Generalized weakness [R53.1]  TAMAR (acute kidney injury) (HCC) [N17.9]  Frequent falls [R29.6]  Non-traumatic rhabdomyolysis [M62.82] Rhabdomyolysis      Precautions: Fall Risk                         ASSESSMENT:  Patient continues to benefit from skilled PT services and is not progressing towards goals. Patient this afternoon with worsening left sided lip droop, right sided head turn preference which now presents as a left sided neglect, worse sitting and standing balance with right sided lean and overall worsening functional mobility. Attending MD, RRT RN, primary and charge RN aware. Per attending note earlier today, plan to obtain repeat MRI (last one at prior admission 3/27 with similar, though milder symptoms when this PT was  to person  Cognition  Overall Cognitive Status: Exceptions  Arousal/Alertness: Delayed responses to stimuli;Inconsistent responses to stimuli  Following Commands: Follows one step commands with increased time;Follows one step commands with repetition  Attention Span: Attends with cues to redirect;Difficulty attending to directions  Memory: Decreased short term memory;Decreased recall of recent events  Insights: Decreased awareness of deficits  Initiation: Requires cues for all  Sequencing: Requires cues for all    Functional Mobility Training:  Bed Mobility:  Bed Mobility Training  Bed Mobility Training: Yes  Rolling: Moderate assistance  Supine to Sit: Moderate assistance;Assist X2  Sit to Supine: Maximum assistance;Assist X2  Scooting: Maximum assistance;Additional time  Transfers:  Transfer Training  Transfer Training: Yes  Overall Level of Assistance: Moderate assistance;Assist X2  Sit to Stand: Moderate assistance;Assist X2  Stand to Sit: Moderate assistance;Assist X2  Bed to Chair: Maximum assistance;Assist X2  Balance:  Balance  Sitting: Impaired  Sitting - Static:  (R sided lean)  Sitting - Dynamic: Poor (constant support)  Standing: Impaired  Standing - Static: Constant support;Poor  Standing - Dynamic: Constant support;Poor   Ambulation/Gait Training:     Gait  Overall Level of Assistance: Maximum assistance;Assist X2  Distance (ft): 3 Feet (x2)  Assistive Device: Gait belt (maicol HHA)  Base of Support: Shift to right  Gait Abnormalities: Shuffling gait;Decreased step clearance;Step to gait  Neuro Re-Education:                    Pain Ratin/10   Pain Intervention(s):       Activity Tolerance:   Fair  and requires rest breaks    After treatment:   Patient left in no apparent distress in bed, Call bell within reach, Bed/ chair alarm activated, Side rails x3, and Patient offloaded in partial L side lying for pressure relief and to facilitate midline position of her head       COMMUNICATION/EDUCATION:   The

## 2024-04-03 NOTE — PLAN OF CARE
Problem: Safety - Adult  Goal: Free from fall injury  Outcome: Progressing     Problem: Confusion  Goal: Confusion, delirium, dementia, or psychosis is improved or at baseline  Description: INTERVENTIONS:  1. Assess for possible contributors to thought disturbance, including medications, impaired vision or hearing, underlying metabolic abnormalities, dehydration, psychiatric diagnoses, and notify attending LIP  2. [place] high risk fall precautions, as indicated  3. Provide frequent short contacts to provide reality reorientation, refocusing and direction  4. Decrease environmental stimuli, including noise as appropriate  5. Monitor and intervene to maintain adequate nutrition, hydration, elimination, sleep and activity  6. If unable to ensure safety without constant attention obtain sitter and review sitter guidelines with assigned personnel  7. Initiate Psychosocial CNS and Spiritual Care consult, as indicated  Outcome: Progressing     Problem: Skin/Tissue Integrity  Goal: Absence of new skin breakdown  Description: 1.  Monitor for areas of redness and/or skin breakdown  2.  Assess vascular access sites hourly  3.  Every 4-6 hours minimum:  Change oxygen saturation probe site  4.  Every 4-6 hours:  If on nasal continuous positive airway pressure, respiratory therapy assess nares and determine need for appliance change or resting period.  Outcome: Progressing

## 2024-04-03 NOTE — PROGRESS NOTES
Report given  to Jonas RN in IMCU at this time. Son Dixon called a this time and updated about the plan of care, room number given to son. Plan of care ongoing. Reece catheter placed by [facility] in place and draining without any issues.

## 2024-04-03 NOTE — PROGRESS NOTES
[facility]  [address] [place], [place] [id number] [phone]    Formerly [facility]  Hospitalist Group                                                                                          Hospitalist Progress Note  Diana Massey MD        Date of Service:  4/3/2024  NAME:  Yumi Sandoval  :  1935  MRN:  [id number]      Admission Summary:   88-year-old female presented to the ED with generalized weakness and multiple falls    Interval history / Subjective:   Appears slightly confused, requires assistance with eating.  Per family at bedside she has not required assistance previously with eating     Assessment & Plan:     Generalized weakness  -Suspect progression of dementia  -She currently lives at an assisted living facility and was getting PT OT as an outpatient after recent hospitalization for similar issues  -Discussed with son and he states that at discharge she was doing okay however has had multiple falls and rapidly declining.  He also has noted that she has been too weak to feed herself and that she has been favoring her right side more  -Recheck MRI brain  -PT/OT evaluated here and recommended SNF once stable    Urinary retention  -Straight cath urine x 1  -Recheck urinalysis  -If she continues to retain then will place Reece and consult urology    Rhabdomyolysis  -Patient was found facedown on her bed for an unknown amount of time  -Repeat CK in the morning  -Hold Lipitor for now    TAMAR  -Improving with IV fluids    Hypertension  -Continue Norvasc    Anxiety and depression  -Continue BuSpar, Klonopin, Paxil    Dementia  -Resides in memory care  -Continue Namenda and Aricept    Hypokalemia  -Replete and monitor        Outisde Records, prior notes, labs, radiology, and medications reviewed     Code status: Full code  DVT prophylaxis: Heparin       Hospital Problems             Last Modified POA    * (Principal) Rhabdomyolysis 2024 Yes

## 2024-04-03 NOTE — CONSULTS
New Urology Consult Note    Patient: Yumi Sandoval MRN: [id number]  SSN: [id number]    YOB: 1935  Age: 88 y.o.  Sex: female            Assessment:     Yumi Sandoval is a 88 y.o. female admitted for generalized weakness, multiple falls, concern for progression of dementia.  Urology consulted for difficult FC placement, urinary retention of 1 liter.    On assessment nad, poor historian due to dementia. Bladder distention noted on exam, patient denies suprapubic pain or pressure.  Nursing reports several attempts to place FC, unable to identify urinary meatus.   Bed was placed in trendelenburg position, with the assistance of nursing staff patient was placed with legs , flexed and supported by nursing staff. Urinary meatus can not be easily visualized or felt. In sterile fashion I attempted to place 16 fr caudae, 12 fr silicone and 10 fr silicone FC without success. Repositioned patient and again attempted to place 16 fr caudae.   My colleague Julieta Johnson NP came to assist and after several attempts in sterile fashion was able to place 16 fr caudae FC, with immediate return of 1200 cc of clear yellow urine.    Tachy, HD, afebrile  No leukocytosis, Creatinine- 0.87  4/1 UA not c/e UTI  No  imaging to review    Recommendations:     Urinary Retention  Difficult FC placement    May need to consider SPT placement prior to DC if FC will be required going forward.  - Maintain FC for at least 10-14 days, VT should only be attempted if patient returns to baseline. Per family patient was walking, voiding independently prior to hospital admission. If it is felt patient will need FC long term, SPT placement should be considered.  - Given number of attempts at FC placement, patent should receive empiric ABX while UA and Ucx pending.  - Monitor for POD and decompression hematuria, electrolyte repletion per primary team    Will assess in am         Thank you for this consult. Please contact  (Axillary)   Resp 17   Ht 1.575 m (5' 2\")   Wt 49.9 kg (110 lb)   SpO2 95%   BMI 20.12 kg/m²       Physical Exam  General: NAD, A to self   HEENT: lids normal, no tracheal deviation, no lesions or deformities  Pulmonary: Normal work of breathing   Abdomen: soft, NTTP, nondistended, no suprapubic fullness or tenderness  :no flank pain, no CVA tenderness  Gait: not observed  Neuro: Appropriate, no focal neurological deficits  Mood/Affect: normal    Lab/Imaging Review:       Most Recent Labs:  Lab Results   Component Value Date/Time    WBC 7.9 04/02/2024 03:57 AM    HGB 11.6 04/02/2024 03:57 AM    HCT 35.8 04/02/2024 03:57 AM     04/02/2024 03:57 AM    MCV 84.6 04/02/2024 03:57 AM        Lab Results   Component Value Date/Time     04/02/2024 03:57 AM    K 3.8 04/02/2024 03:57 AM     04/02/2024 03:57 AM    CO2 26 04/02/2024 03:57 AM    BUN 20 04/02/2024 03:57 AM    GFRAA 53 06/17/2022 01:58 PM    GLOB 4.2 04/01/2024 12:27 PM    ALT 33 04/01/2024 12:27 PM        No results found for: \"PSA\", \"PSA2\", \"PSAR1\", \"PSA1\", \"PSA3\", \"[id number]\", \"PSAEXT\"     No results found for: \"HBA1C\", \"JCW1ZLUV\"     No results found for: \"CPK\", \"RCK1\", \"CKMB\", \"BNP\"       Urine/Blood Cultures:  Results       Procedure Component Value Units Date/Time    Urine Culture Hold Sample [[id number]] Collected: 04/01/24 1855    Order Status: Completed Specimen: Urine Updated: 04/01/24 1911     Specimen HOld       Urine on hold in Microbiology dept for 2 days.  If unpreserved urine is submitted, it cannot be used for addtional testing after 24 hours, recollection will be required.                    Signed By: ESSENCE GarayP  - April 3, 2024

## 2024-04-03 NOTE — CARE COORDINATION
Transition of Care Plan:    RUR: 16%  Prior Level of Functioning: resident at [facility]  Disposition: SNF: pending  ([facility])          CM met with Pt's son regarding SNF recommendation for discharge. CM provided Pt's son with SNF list. Pt's son requested that a referral be sent to [facility]. CM strongly encouraged Pt's son to provide additional preferences.       ANAMARIA Villa, KIRSTEN  [facility] Care Manager  [phone]

## 2024-04-03 NOTE — PLAN OF CARE
semisupine in bed, rotated towards R and only attending to stimulus to L with max cues. Pt transferred supine>sit with mod A x2 and demo'd persistent R lateral lean, posture briefly corrected to midline with visual cues but would drift again to R. Assisted to with stand pivot transfer bed<>BSC, pt required constant mod A x2 for balance and physical assistance to weight shift to R to move LLE. Total A provided for toilet hygiene. Of note pt with mild full body shaking while on BSC, alert and responding appropriately. Also note pt frequently yawning throughout session. She was returned to bed with max A x2. At this time pt is functioning well below her baseline and will benefit from continued therapy to address the above impairments. Recommend rehab at discharge.         PLAN :  Patient continues to benefit from skilled intervention to address the above impairments.  Continue treatment per established plan of care to address goals.    Recommendation for discharge: (in order for the patient to meet his/her long term goals): Therapy up to 5 days/week in Skilled nursing facility    Other factors to consider for discharge: patient's current support system is unable to meet their requirements for physical assistance, high risk for falls, not safe to be alone, and concern for safely navigating or managing the home environment    IF patient discharges home will need the following DME: continuing to assess with progress       SUBJECTIVE:   Patient stated “It's March.”    OBJECTIVE DATA SUMMARY:   Cognitive/Behavioral Status:  Orientation  Overall Orientation Status: Impaired  Orientation Level: Disoriented to time;Disoriented to situation;Oriented to place;Oriented to person  Cognition  Overall Cognitive Status: Exceptions  Arousal/Alertness: Delayed responses to stimuli;Inconsistent responses to stimuli  Following Commands: Follows one step commands with increased time;Follows one step commands with repetition  Attention  Span: Attends with cues to redirect;Difficulty attending to directions  Memory: Decreased short term memory;Decreased recall of recent events  Insights: Decreased awareness of deficits  Initiation: Requires cues for all  Sequencing: Requires cues for all    Functional Mobility and Transfers for ADLs:  Bed Mobility:  Bed Mobility Training  Bed Mobility Training: Yes  Rolling: Moderate assistance  Supine to Sit: Moderate assistance;Assist X2  Sit to Supine: Maximum assistance;Assist X2  Scooting: Maximum assistance;Additional time     Transfers:   Transfer Training  Transfer Training: Yes  Overall Level of Assistance: Moderate assistance;Assist X2  Sit to Stand: Moderate assistance;Assist X2  Stand to Sit: Moderate assistance;Assist X2  Bed to Chair: Maximum assistance;Assist X2           Balance:  Standing: Impaired  Balance  Sitting: Impaired  Sitting - Static:  (R sided lean)  Sitting - Dynamic: Poor (constant support)  Standing: Impaired  Standing - Static: Constant support;Poor  Standing - Dynamic: Constant support;Poor      ADL Intervention:    Toileting: Dependent/Total        Skin Care: Bath wipes;Chlorhexidine wipes          Pain Rating:  Pt reported no pain during session    Activity Tolerance:   Fair   Please refer to the flowsheet for vital signs taken during this treatment.    After treatment:   Patient left in no apparent distress in bed, Call bell within reach, Bed/ chair alarm activated, and Side rails x3    COMMUNICATION/EDUCATION:   The patient's plan of care was discussed with: physical therapist, registered nurse, and RRT nurse, charge nurse     Thank you for this referral.  Charline Ricks OT  Minutes: 48

## 2024-04-04 ENCOUNTER — HOSPITAL ENCOUNTER (INPATIENT)
Facility: HOSPITAL | Age: 89
DRG: 884 | End: 2024-04-04
Payer: MEDICARE

## 2024-04-04 LAB
ANION GAP SERPL CALC-SCNC: 6 MMOL/L (ref 5–15)
BUN SERPL-MCNC: 18 MG/DL (ref 6–20)
BUN/CREAT SERPL: 22 (ref 12–20)
CALCIUM SERPL-MCNC: 9.1 MG/DL (ref 8.5–10.1)
CHLORIDE SERPL-SCNC: 111 MMOL/L (ref 97–108)
CK SERPL-CCNC: 356 U/L (ref 26–192)
CO2 SERPL-SCNC: 24 MMOL/L (ref 21–32)
COMMENT:: NORMAL
CREAT SERPL-MCNC: 0.83 MG/DL (ref 0.55–1.02)
GLUCOSE SERPL-MCNC: 96 MG/DL (ref 65–100)
MAGNESIUM SERPL-MCNC: 1.7 MG/DL (ref 1.6–2.4)
POTASSIUM SERPL-SCNC: 2.7 MMOL/L (ref 3.5–5.1)
SODIUM SERPL-SCNC: 141 MMOL/L (ref 136–145)
SPECIMEN HOLD: NORMAL

## 2024-04-04 PROCEDURE — 6370000000 HC RX 637 (ALT 250 FOR IP): Performed by: FAMILY MEDICINE

## 2024-04-04 PROCEDURE — 6360000002 HC RX W HCPCS: Performed by: NURSE PRACTITIONER

## 2024-04-04 PROCEDURE — 6370000000 HC RX 637 (ALT 250 FOR IP): Performed by: HOSPITALIST

## 2024-04-04 PROCEDURE — 70551 MRI BRAIN STEM W/O DYE: CPT

## 2024-04-04 PROCEDURE — 82550 ASSAY OF CK (CPK): CPT

## 2024-04-04 PROCEDURE — 2060000000 HC ICU INTERMEDIATE R&B

## 2024-04-04 PROCEDURE — 6360000002 HC RX W HCPCS: Performed by: HOSPITALIST

## 2024-04-04 PROCEDURE — 2580000003 HC RX 258: Performed by: NURSE PRACTITIONER

## 2024-04-04 PROCEDURE — 6370000000 HC RX 637 (ALT 250 FOR IP): Performed by: STUDENT IN AN ORGANIZED HEALTH CARE EDUCATION/TRAINING PROGRAM

## 2024-04-04 PROCEDURE — 97535 SELF CARE MNGMENT TRAINING: CPT

## 2024-04-04 PROCEDURE — 36415 COLL VENOUS BLD VENIPUNCTURE: CPT

## 2024-04-04 PROCEDURE — 2580000003 HC RX 258: Performed by: HOSPITALIST

## 2024-04-04 PROCEDURE — 92610 EVALUATE SWALLOWING FUNCTION: CPT

## 2024-04-04 PROCEDURE — 80048 BASIC METABOLIC PNL TOTAL CA: CPT

## 2024-04-04 PROCEDURE — 97112 NEUROMUSCULAR REEDUCATION: CPT

## 2024-04-04 PROCEDURE — 83735 ASSAY OF MAGNESIUM: CPT

## 2024-04-04 RX ORDER — BUSPIRONE HYDROCHLORIDE 5 MG/1
5 TABLET ORAL 2 TIMES DAILY
Status: DISCONTINUED | OUTPATIENT
Start: 2024-04-04 | End: 2024-04-05 | Stop reason: HOSPADM

## 2024-04-04 RX ORDER — POTASSIUM CHLORIDE 750 MG/1
40 TABLET, FILM COATED, EXTENDED RELEASE ORAL EVERY 4 HOURS
Status: COMPLETED | OUTPATIENT
Start: 2024-04-04 | End: 2024-04-04

## 2024-04-04 RX ADMIN — MEMANTINE 5 MG: 10 TABLET ORAL at 09:14

## 2024-04-04 RX ADMIN — DOCUSATE SODIUM 100 MG: 100 CAPSULE, LIQUID FILLED ORAL at 22:40

## 2024-04-04 RX ADMIN — ASPIRIN 81 MG: 81 TABLET, CHEWABLE ORAL at 09:09

## 2024-04-04 RX ADMIN — DONEPEZIL HYDROCHLORIDE 5 MG: 5 TABLET, FILM COATED ORAL at 22:39

## 2024-04-04 RX ADMIN — DOCUSATE SODIUM 100 MG: 100 CAPSULE, LIQUID FILLED ORAL at 09:09

## 2024-04-04 RX ADMIN — SODIUM CHLORIDE, PRESERVATIVE FREE 10 ML: 5 INJECTION INTRAVENOUS at 09:08

## 2024-04-04 RX ADMIN — POTASSIUM CHLORIDE 40 MEQ: 750 TABLET, EXTENDED RELEASE ORAL at 09:09

## 2024-04-04 RX ADMIN — SODIUM CHLORIDE, PRESERVATIVE FREE 10 ML: 5 INJECTION INTRAVENOUS at 22:39

## 2024-04-04 RX ADMIN — MEMANTINE 5 MG: 10 TABLET ORAL at 22:38

## 2024-04-04 RX ADMIN — BUSPIRONE HYDROCHLORIDE 5 MG: 5 TABLET ORAL at 14:51

## 2024-04-04 RX ADMIN — AMLODIPINE BESYLATE 5 MG: 5 TABLET ORAL at 09:08

## 2024-04-04 RX ADMIN — WATER 1000 MG: 1 INJECTION INTRAMUSCULAR; INTRAVENOUS; SUBCUTANEOUS at 09:01

## 2024-04-04 RX ADMIN — HEPARIN SODIUM 5000 UNITS: 5000 INJECTION INTRAVENOUS; SUBCUTANEOUS at 09:08

## 2024-04-04 RX ADMIN — PANTOPRAZOLE SODIUM 40 MG: 40 TABLET, DELAYED RELEASE ORAL at 06:05

## 2024-04-04 RX ADMIN — BUSPIRONE HYDROCHLORIDE 5 MG: 5 TABLET ORAL at 09:08

## 2024-04-04 RX ADMIN — BUSPIRONE HYDROCHLORIDE 5 MG: 5 TABLET ORAL at 22:38

## 2024-04-04 RX ADMIN — HEPARIN SODIUM 5000 UNITS: 5000 INJECTION INTRAVENOUS; SUBCUTANEOUS at 22:37

## 2024-04-04 RX ADMIN — CLONAZEPAM 0.5 MG: 0.5 TABLET ORAL at 09:14

## 2024-04-04 RX ADMIN — POTASSIUM CHLORIDE 40 MEQ: 750 TABLET, EXTENDED RELEASE ORAL at 03:49

## 2024-04-04 ASSESSMENT — PAIN SCALES - GENERAL
PAINLEVEL_OUTOF10: 0

## 2024-04-04 ASSESSMENT — PAIN SCALES - WONG BAKER
WONGBAKER_NUMERICALRESPONSE: NO HURT

## 2024-04-04 NOTE — PROGRESS NOTES
Urology Progress Note    Patient: Yumi Sandoval MRN: [id number]  SSN: [id number]    YOB: 1935  Age: 88 y.o.  Sex: female        Assessment:     Asleep in bed, NAD   Catheter draining clear elaine urine   Cr WNL   Hypokalemia   Nearly 3L urine since catheter placement     Plan:     AUR, difficult catheter placement, postobstructive diuresis   -Freire placed by urology 4/3/24 yielding 1200ml urine. Maintain freire for 10-14 days. VT should only be attempted if patient returns to baseline. Per family patient was walking, voiding independently prior to hospital admission. If it is felt patient will need FC long term, SPT placement should be considered. Given number of attempts at FC placement, patent should receive empiric ABX while UA and Ucx pending.   -Nearly 3L urine returned since placement yesterday evening. Encourage hydration, replete K+, monitor electrolytes     Please call [facility] closer to anticipated DC to arrange OP VT as recommended above at [phone].    Urology signing off    ROS:     Asleep, eyes open but quickly drifts back to sleep and does not partake in exam     Objective:     BP (!) 127/51   Pulse 78   Temp 99.1 °F (37.3 °C) (Oral)   Resp 14   Ht 1.575 m (5' 2\")   Wt 49.9 kg (110 lb)   SpO2 96%   BMI 20.12 kg/m²       Intake/Output Summary (Last 24 hours) at 4/4/2024 0839  Last data filed at 4/4/2024 0608  Gross per 24 hour   Intake 420 ml   Output 2675 ml   Net -2255 ml       Physical Exam  General: NAD, asleep   Respiratory: no distress, room air  Abdomen: soft, no distention  : freire draining clear elaine urine   Neuro: eyes open to voice, exam limited as she drifts back to sleep     Labs reviewed, April 4, 2024  Recent Results (from the past 24 hour(s))   Urinalysis with Reflex to Culture    Collection Time: 04/03/24  4:10 PM    Specimen: Urine   Result Value Ref Range    Color, UA YELLOW/STRAW      Appearance CLEAR CLEAR      Specific

## 2024-04-04 NOTE — PLAN OF CARE
Problem: Safety - Adult  Goal: Free from fall injury  Outcome: Progressing     Problem: Physical Therapy - Adult  Goal: By Discharge: Performs mobility at highest level of function for planned discharge setting.  See evaluation for individualized goals.  Description:  FUNCTIONAL STATUS PRIOR TO ADMISSION: Patient was ambulatory and active without use of DME in memory care unit. Multiple hospital admissions in the past week, 3/25 - 3/28; 3/30 & 4/1.     HOME SUPPORT PRIOR TO ADMISSION: The patient lived with staff at memory care unit. Son reports multiple ground level falls since 3/28       Physical Therapy Goals  Initiated 4/2/2024  1.  Patient will move from supine to sit and sit to supine, scoot up and down, and roll side to side in bed with supervision/set-up within 7 day(s).    2.  Patient will perform sit to stand with contact guard assist within 7 day(s).  3.  Patient will transfer from bed to chair and chair to bed with contact guard assist using the least restrictive device within 7 day(s).  4.  Patient will ambulate with contact guard assist for 100 feet with the least restrictive device within 7 day(s).       4/4/2024 1422 by Nicolasa Mcgowan, PT  Outcome: Progressing     Problem: Occupational Therapy - Adult  Goal: By Discharge: Performs self-care activities at highest level of function for planned discharge setting.  See evaluation for individualized goals.  Description: FUNCTIONAL STATUS PRIOR TO ADMISSION: Patient was ambulatory and active without use of DME in memory care unit. Multiple hospital admissions in the past week, 3/25 - 3/28; 3/30 & 4/1.     HOME SUPPORT PRIOR TO ADMISSION: The patient lived with staff at memory care unit. Son reports multiple ground level falls since 3/28           Occupational Therapy Goals:  Initiated 4/2/2024  1.  Patient will perform lower body dressing with Minimal Assist within 7 day(s).  2.  Patient will perform grooming, seated EOB,  with Supervision within 7  day(s).  3.  Patient will perform toilet transfers with Minimal Assist  within 7 day(s).  4.  Patient will perform all aspects of toileting with Minimal Assist within 7 day(s).  5.  Patient will participate in upper extremity therapeutic exercise/activities with Supervision for 10 minutes within 7 day(s).    6  Patient will utilize energy conservation techniques during functional activities with verbal cues within 7 day(s).    4/4/2024 1520 by Mariah Rosenberg OT  Outcome: Progressing     Problem: Confusion  Goal: Confusion, delirium, dementia, or psychosis is improved or at baseline  Description: INTERVENTIONS:  1. Assess for possible contributors to thought disturbance, including medications, impaired vision or hearing, underlying metabolic abnormalities, dehydration, psychiatric diagnoses, and notify attending LIP  2. [place] high risk fall precautions, as indicated  3. Provide frequent short contacts to provide reality reorientation, refocusing and direction  4. Decrease environmental stimuli, including noise as appropriate  5. Monitor and intervene to maintain adequate nutrition, hydration, elimination, sleep and activity  6. If unable to ensure safety without constant attention obtain sitter and review sitter guidelines with assigned personnel  7. Initiate Psychosocial CNS and Spiritual Care consult, as indicated  Outcome: Progressing     Problem: Skin/Tissue Integrity  Goal: Absence of new skin breakdown  Description: 1.  Monitor for areas of redness and/or skin breakdown  2.  Assess vascular access sites hourly  3.  Every 4-6 hours minimum:  Change oxygen saturation probe site  4.  Every 4-6 hours:  If on nasal continuous positive airway pressure, respiratory therapy assess nares and determine need for appliance change or resting period.  Outcome: Progressing     Problem: Chronic Conditions and Co-morbidities  Goal: Patient's chronic conditions and co-morbidity symptoms are monitored and maintained or

## 2024-04-04 NOTE — PROGRESS NOTES
[facility]  [address] [place] [address] [phone]    Formerly [facility] Adult  Hospitalist Group                                                                                          Hospitalist Progress Note  Diana Massey MD        Date of Service:  2024  NAME:  Yumi Sandoval  :  1935  MRN:  [id number]      Admission Summary:   88-year-old female presented to the ED with generalized weakness and multiple falls    Interval history / Subjective:   Appears slightly confused, requires assistance with eating.  Per family at bedside she has not required assistance previously with eating     Assessment & Plan:     Generalized weakness  -Suspect progression of dementia  -She currently lives at an assisted living facility and was getting PT OT as an outpatient after recent hospitalization for similar issues  -Discussed with son and he states that at discharge she was doing okay however has had multiple falls and rapidly declining.  He also has noted that she has been too weak to feed herself and that she has been favoring her right side more  -Recheck MRI brain  -PT/OT evaluated here and recommended SNF once stable    Urinary retention  -Urology was able to place a Reece catheter with great difficulty  -Urine culture pending but continue empiric Rocephin for now  -Urology evaluated and they recommend keeping Reece in for 10 to 14 days.  Voiding trial should only be attempted if patient returns to baseline and if it is felt that she needs Reece long-term then SPT placement should be considered  -Please call [place] urology closer to anticipated discharge to arrange outpatient voiding trial    Rhabdomyolysis  -Patient was found facedown on her bed for an unknown amount of time  -CK trending down  -Continue to hold Lipitor for now    TAMAR  -Improving with IV fluids    Hypertension  -Continue Norvasc    Anxiety and depression  -Continue BuSpar, Paxil.

## 2024-04-04 NOTE — PLAN OF CARE
Problem: Safety - Adult  Goal: Free from fall injury  Outcome: Progressing     Problem: Confusion  Goal: Confusion, delirium, dementia, or psychosis is improved or at baseline  Description: INTERVENTIONS:  1. Assess for possible contributors to thought disturbance, including medications, impaired vision or hearing, underlying metabolic abnormalities, dehydration, psychiatric diagnoses, and notify attending LIP  2. [place] high risk fall precautions, as indicated  3. Provide frequent short contacts to provide reality reorientation, refocusing and direction  4. Decrease environmental stimuli, including noise as appropriate  5. Monitor and intervene to maintain adequate nutrition, hydration, elimination, sleep and activity  6. If unable to ensure safety without constant attention obtain sitter and review sitter guidelines with assigned personnel  7. Initiate Psychosocial CNS and Spiritual Care consult, as indicated  Outcome: Progressing  Flowsheets (Taken 4/3/2024 2000)  Effect of thought disturbance (confusion, delirium, dementia, or psychosis) are managed with adequate functional status:   Assess for contributors to thought disturbance, including medications, impaired vision or hearing, underlying metabolic abnormalities, dehydration, psychiatric diagnoses, notify LIP   [place] high risk fall precautions, as indicated   Provide frequent short contacts to provide reality reorientation, refocusing and direction   Decrease environmental stimuli, including noise as appropriate   Monitor and intervene to maintain adequate nutrition, hydration, elimination, sleep and activity

## 2024-04-04 NOTE — PLAN OF CARE
Problem: Physical Therapy - Adult  Goal: By Discharge: Performs mobility at highest level of function for planned discharge setting.  See evaluation for individualized goals.  Description:  FUNCTIONAL STATUS PRIOR TO ADMISSION: Patient was ambulatory and active without use of DME in memory care unit. Multiple hospital admissions in the past week, 3/25 - 3/28; 3/30 & 4/1.     HOME SUPPORT PRIOR TO ADMISSION: The patient lived with staff at [facility] unit. Son reports multiple ground level falls since 3/28       Physical Therapy Goals  Initiated 4/2/2024  1.  Patient will move from supine to sit and sit to supine, scoot up and down, and roll side to side in bed with supervision/set-up within 7 day(s).    2.  Patient will perform sit to stand with contact guard assist within 7 day(s).  3.  Patient will transfer from bed to chair and chair to bed with contact guard assist using the least restrictive device within 7 day(s).  4.  Patient will ambulate with contact guard assist for 100 feet with the least restrictive device within 7 day(s).       Outcome: Progressing     PHYSICAL THERAPY TREATMENT    Patient: Yumi Sandoval (88 y.o. female)  Date: 4/4/2024  Diagnosis: Rhabdomyolysis [M62.82]  Generalized weakness [R53.1]  TAMAR (acute kidney injury) (HCC) [N17.9]  Frequent falls [R29.6]  Non-traumatic rhabdomyolysis [M62.82] Rhabdomyolysis      Precautions: Fall Risk                      ASSESSMENT:  Patient continues to benefit from skilled PT services and is slowly progressing towards goals. Patient alert and participated well in co-treatment of PT/OT today focusing on neuromuscular re-education for sitting and standing balance. Repeated cues/training implemented for correction of right lateral trunk and head lean with inconsistent ability to self-correct and maintain despite verbal/visual/tactile and manual cues. Patient able to progress to unsupported sitting balance with contact guard assist / minimal assist and  plan of care was discussed with: occupational therapist and registered nurse    Patient Education  Education Given To: Patient  Education Provided: Role of Therapy;Plan of Care;Transfer Training;Fall Prevention Strategies  Education Method: Verbal;Demonstration  Barriers to Learning: Cognition  Education Outcome: Continued education needed      Nicolasa Mcgowan PT  Minutes: 26

## 2024-04-04 NOTE — CARE COORDINATION
Care Management Progress Note:   CM followed up with [facility] for SNF rehab at [place], acceptance still pending. Per IDR, urine cx and MRI still pending. Dispo pending acceptance at SNF for rehab prior to returning to [facility] where patient currently resides.  ______________________  Ashley EDGE, RN  Care Management  4/4/2024  2:37 PM

## 2024-04-04 NOTE — PROGRESS NOTES
Son, Dixon in this morning asking if any of his mothers medications will be adjusted he states \"she's better today but still real slow\". Text paged Dr. Massey with sons questions and received new orders, \"no change to buspirone, no more xanax and tapering klonopin\".   Phone call to son and reviewed medication plan.   JESUS Recinos

## 2024-04-04 NOTE — PLAN OF CARE
Problem: Occupational Therapy - Adult  Goal: By Discharge: Performs self-care activities at highest level of function for planned discharge setting.  See evaluation for individualized goals.  Description: FUNCTIONAL STATUS PRIOR TO ADMISSION: Patient was ambulatory and active without use of DME in memory care unit. Multiple hospital admissions in the past week, 3/25 - 3/28; 3/30 & 4/1.     HOME SUPPORT PRIOR TO ADMISSION: The patient lived with staff at [facility] care unit. Son reports multiple ground level falls since 3/28           Occupational Therapy Goals:  Initiated 4/2/2024  1.  Patient will perform lower body dressing with Minimal Assist within 7 day(s).  2.  Patient will perform grooming, seated EOB,  with Supervision within 7 day(s).  3.  Patient will perform toilet transfers with Minimal Assist  within 7 day(s).  4.  Patient will perform all aspects of toileting with Minimal Assist within 7 day(s).  5.  Patient will participate in upper extremity therapeutic exercise/activities with Supervision for 10 minutes within 7 day(s).    6  Patient will utilize energy conservation techniques during functional activities with verbal cues within 7 day(s).    Outcome: Progressing   OCCUPATIONAL THERAPY TREATMENT  Patient: Yumi Sandoval (88 y.o. female)  Date: 4/4/2024  Primary Diagnosis: Rhabdomyolysis [M62.82]  Generalized weakness [R53.1]  TAMAR (acute kidney injury) (HCC) [N17.9]  Frequent falls [R29.6]  Non-traumatic rhabdomyolysis [M62.82]       Precautions: Fall Risk                Chart, occupational therapy assessment, plan of care, and goals were reviewed.  ASSESSMENT  Patient continues to benefit from skilled OT services and is progressing towards goals. Patient received in bed, meal in front of her, but fork in lap, poor positioning with R head turn, R side drooling.  Paitient with improved tolerance for ADL tasks today.  Patient to EOB with max assist x 2.  However, able to tolerated EOB sitting x 15 minutes

## 2024-04-04 NOTE — PROGRESS NOTES
[facility].  Mrs. Sandoval is Buddhism.  She was asleep at the time of the visit and no family member was present. Prayer for spiritual communion offered at bedside. Will continue to keep her in my prayer.  Plan to visit on Tuesday.    Chaplain Arciniega. CHRISTI Patino, RN, ACSW, LCSW   Page:  [phone])

## 2024-04-04 NOTE — PLAN OF CARE
Speech LAnguage Pathology EVALUATION    Patient: Yumi Sandoval (88 y.o. female)  Date: 4/4/2024  Primary Diagnosis: Rhabdomyolysis [M62.82]  Generalized weakness [R53.1]  TAMAR (acute kidney injury) (HCC) [N17.9]  Frequent falls [R29.6]  Non-traumatic rhabdomyolysis [M62.82]       Precautions: aspiration Fall Risk                  ASSESSMENT :  Patient with mild oral dysphagia, characterized by occasional mild R oral leakage with liquids and solids.  No s/s aspiration but has risks due to dementia. She fed herself slowly and may benefit from  liquid supplements. Voice/speech clear today. She has R lean and R gaze, but L inattention   Admitted 4-1-24 with weakness falls, L facial droop, L inattention, R lean .  Head CT: negative. CXR: negative.  MRI: 3-27-24: moderate atrophy  PMH: dementia, A&D,  HTN,  L facial droop, parkinsonism    Patient will benefit from skilled intervention to address the above impairments.     PLAN :  Recommendations and Planned Interventions:  Diet: Regular and thin liquids  Watch for oral residue.   Upright for all PO  Consider liquids supplement     Recommend next SLP session: f/u x1 and discharge    Acute SLP Services: Yes, patient will be followed by speech-language pathology 2x/week to address goals. Patient's rehabilitation potential is considered to be Fair.    Discharge Recommendations: Continue to assess pending progress     SUBJECTIVE:   Patient stated, “I like coffee.”    OBJECTIVE:     Past Medical History:   Diagnosis Date    Anxiety disorder     Constipation     Essential hypertension     Memory disorder     short term     Past Surgical History:   Procedure Laterality Date    APPENDECTOMY      TONSILLECTOMY       Prior Level of Function/Home Situation:   Social/Functional History  Lives With: Other (comment) ([facility] staff)  Type of Home: Assisted living  Home Layout: One level  Home Access: Level entry  Bathroom Shower/Tub: Walk-in shower  Bathroom Toilet: Handicap height  Bathroom  including recommendations, planned interventions, and recommended diet changes were discussed with:  none    Patient is unable to participate in goal setting and plan of care    Thank you,  Leticia Ng, SLP  Minutes: 15    Problem: SLP Adult - Impaired Swallowing  Goal: By Discharge: Advance to least restrictive diet without signs or symptoms of aspiration for planned discharge setting.  See evaluation for individualized goals.  Outcome: Progressing

## 2024-04-05 VITALS
RESPIRATION RATE: 17 BRPM | HEART RATE: 108 BPM | DIASTOLIC BLOOD PRESSURE: 61 MMHG | OXYGEN SATURATION: 96 % | TEMPERATURE: 97.7 F | WEIGHT: 110 LBS | SYSTOLIC BLOOD PRESSURE: 155 MMHG | BODY MASS INDEX: 20.24 KG/M2 | HEIGHT: 62 IN

## 2024-04-05 LAB
ANION GAP SERPL CALC-SCNC: 4 MMOL/L (ref 5–15)
BUN SERPL-MCNC: 17 MG/DL (ref 6–20)
BUN/CREAT SERPL: 22 (ref 12–20)
CALCIUM SERPL-MCNC: 9.7 MG/DL (ref 8.5–10.1)
CHLORIDE SERPL-SCNC: 108 MMOL/L (ref 97–108)
CO2 SERPL-SCNC: 27 MMOL/L (ref 21–32)
CREAT SERPL-MCNC: 0.77 MG/DL (ref 0.55–1.02)
GLUCOSE SERPL-MCNC: 106 MG/DL (ref 65–100)
POTASSIUM SERPL-SCNC: 3.3 MMOL/L (ref 3.5–5.1)
SARS-COV-2 RNA RESP QL NAA+PROBE: NOT DETECTED
SODIUM SERPL-SCNC: 139 MMOL/L (ref 136–145)
SOURCE: NORMAL

## 2024-04-05 PROCEDURE — 97116 GAIT TRAINING THERAPY: CPT

## 2024-04-05 PROCEDURE — 80048 BASIC METABOLIC PNL TOTAL CA: CPT

## 2024-04-05 PROCEDURE — 51702 INSERT TEMP BLADDER CATH: CPT

## 2024-04-05 PROCEDURE — 6360000002 HC RX W HCPCS: Performed by: NURSE PRACTITIONER

## 2024-04-05 PROCEDURE — 6370000000 HC RX 637 (ALT 250 FOR IP): Performed by: HOSPITALIST

## 2024-04-05 PROCEDURE — 6370000000 HC RX 637 (ALT 250 FOR IP): Performed by: NURSE PRACTITIONER

## 2024-04-05 PROCEDURE — 92526 ORAL FUNCTION THERAPY: CPT

## 2024-04-05 PROCEDURE — 87635 SARS-COV-2 COVID-19 AMP PRB: CPT

## 2024-04-05 PROCEDURE — 2580000003 HC RX 258: Performed by: NURSE PRACTITIONER

## 2024-04-05 PROCEDURE — 36415 COLL VENOUS BLD VENIPUNCTURE: CPT

## 2024-04-05 PROCEDURE — 6370000000 HC RX 637 (ALT 250 FOR IP): Performed by: FAMILY MEDICINE

## 2024-04-05 PROCEDURE — 2580000003 HC RX 258: Performed by: HOSPITALIST

## 2024-04-05 PROCEDURE — 6360000002 HC RX W HCPCS: Performed by: HOSPITALIST

## 2024-04-05 PROCEDURE — 97535 SELF CARE MNGMENT TRAINING: CPT

## 2024-04-05 PROCEDURE — 97112 NEUROMUSCULAR REEDUCATION: CPT

## 2024-04-05 RX ORDER — POLYETHYLENE GLYCOL 3350 17 G/17G
17 POWDER, FOR SOLUTION ORAL DAILY PRN
Qty: 527 G | Refills: 0 | Status: SHIPPED
Start: 2024-04-05 | End: 2024-05-06

## 2024-04-05 RX ORDER — BUSPIRONE HYDROCHLORIDE 5 MG/1
TABLET ORAL
Qty: 7 TABLET | Refills: 0 | Status: SHIPPED
Start: 2024-04-05 | End: 2024-04-10

## 2024-04-05 RX ORDER — CLONAZEPAM 0.5 MG/1
0.25 TABLET ORAL NIGHTLY
Status: DISCONTINUED | OUTPATIENT
Start: 2024-04-06 | End: 2024-04-05 | Stop reason: HOSPADM

## 2024-04-05 RX ORDER — POTASSIUM CHLORIDE 750 MG/1
40 TABLET, FILM COATED, EXTENDED RELEASE ORAL ONCE
Status: DISCONTINUED | OUTPATIENT
Start: 2024-04-05 | End: 2024-04-05 | Stop reason: ALTCHOICE

## 2024-04-05 RX ORDER — CLONAZEPAM 0.5 MG/1
0.25 TABLET ORAL
Qty: 15 TABLET | Refills: 0 | Status: SHIPPED | OUTPATIENT
Start: 2024-04-05 | End: 2024-05-05

## 2024-04-05 RX ORDER — BUSPIRONE HYDROCHLORIDE 5 MG/1
5 TABLET ORAL DAILY
Status: DISCONTINUED | OUTPATIENT
Start: 2024-04-08 | End: 2024-04-05 | Stop reason: HOSPADM

## 2024-04-05 RX ADMIN — PANTOPRAZOLE SODIUM 40 MG: 40 TABLET, DELAYED RELEASE ORAL at 06:09

## 2024-04-05 RX ADMIN — HEPARIN SODIUM 5000 UNITS: 5000 INJECTION INTRAVENOUS; SUBCUTANEOUS at 08:53

## 2024-04-05 RX ADMIN — WATER 1000 MG: 1 INJECTION INTRAMUSCULAR; INTRAVENOUS; SUBCUTANEOUS at 08:53

## 2024-04-05 RX ADMIN — POTASSIUM BICARBONATE 40 MEQ: 782 TABLET, EFFERVESCENT ORAL at 10:35

## 2024-04-05 RX ADMIN — MEMANTINE 5 MG: 10 TABLET ORAL at 08:53

## 2024-04-05 RX ADMIN — CLONAZEPAM 0.5 MG: 0.5 TABLET ORAL at 08:52

## 2024-04-05 RX ADMIN — AMLODIPINE BESYLATE 5 MG: 5 TABLET ORAL at 08:53

## 2024-04-05 RX ADMIN — POTASSIUM BICARBONATE 40 MEQ: 782 TABLET, EFFERVESCENT ORAL at 06:10

## 2024-04-05 RX ADMIN — ASPIRIN 81 MG: 81 TABLET, CHEWABLE ORAL at 08:52

## 2024-04-05 RX ADMIN — SODIUM CHLORIDE, PRESERVATIVE FREE 10 ML: 5 INJECTION INTRAVENOUS at 08:53

## 2024-04-05 RX ADMIN — BUSPIRONE HYDROCHLORIDE 5 MG: 5 TABLET ORAL at 08:52

## 2024-04-05 RX ADMIN — DOCUSATE SODIUM 100 MG: 100 CAPSULE, LIQUID FILLED ORAL at 08:53

## 2024-04-05 ASSESSMENT — PAIN SCALES - GENERAL
PAINLEVEL_OUTOF10: 0
PAINLEVEL_OUTOF10: 0

## 2024-04-05 NOTE — PLAN OF CARE
Speech LAnguage Pathology TREATMENT/DISCHARGE    Patient: Yumi Sandoval (88 y.o. female)  Date: 4/5/2024  Primary Diagnosis: Rhabdomyolysis [M62.82]  Generalized weakness [R53.1]  TAMAR (acute kidney injury) (HCC) [N17.9]  Frequent falls [R29.6]  Non-traumatic rhabdomyolysis [M62.82]       Precautions:  Fall Risk                  ASSESSMENT :  Patient tolerating regular diet, thin liquids. She remains confused with memory loss; constantly asking about her son.     Patient will be discharged from skilled speech-language pathology services at this time.     PLAN :  Recommendations and Planned Interventions:  Diet: Regular and thin liquids  Upright for all PO  Needs set up of tray and food cut up  Maximize finger foods.          Acute SLP Services: No, patient will be discharged from acute skilled speech-language pathology at this time.    Discharge Recommendations: No, additional SLP treatment not indicated at discharge     SUBJECTIVE:   Patient stated, “Does my son know where I am?.” 10x in this session.    OBJECTIVE:     Past Medical History:   Diagnosis Date    Anxiety disorder     Constipation     Essential hypertension     Memory disorder     short term     Past Surgical History:   Procedure Laterality Date    APPENDECTOMY      TONSILLECTOMY       Prior Level of Function/Home Situation:   Social/Functional History  Lives With: Other (comment) (long-term staff)  Type of Home: Assisted living  Home Layout: One level  Home Access: Level entry  Bathroom Shower/Tub: Walk-in shower  Bathroom Toilet: Handicap height  Bathroom Equipment: Grab bars in shower, Built-in shower seat, Grab bars around toilet  Home Equipment: Cane, Walker, rolling  ADL Assistance: Independent  Homemaking Assistance: Needs assistance  Homemaking Responsibilities: No  Active : Yes  Patient's  Info: family or long-term shuttle  Occupation: Retired    Baseline Assessment:                Cognitive and Communication Status:  Neurologic State:

## 2024-04-05 NOTE — DISCHARGE SUMMARY
[facility]  [address] [place] [address] [phone]    [facility] Adult  Hospitalist Group     Discharge Summary       PATIENT ID: Yumi Sandoval  MRN: [id number]   YOB: 1935    DATE OF ADMISSION: 4/1/2024 12:13 PM    DATE OF DISCHARGE: 04/05/24   PRIMARY CARE PROVIDER: Debbie Brennan MD     DISCHARGING PROVIDER: Diana Massey MD      CONSULTATIONS: IP CONSULT TO UROLOGY    PROCEDURES/SURGERIES: * No surgery found *    ADMITTING DIAGNOSES & HOSPITAL COURSE:     Generalized weakness  Polypharmacy  -Suspect progression of dementia  -She currently lives at an assisted living facility and was getting PT OT as an outpatient after recent hospitalization for similar issues  -Discussed with son and he states that at discharge she was doing okay however has had multiple falls and rapidly declining.  He also has noted that she has been too weak to feed herself and that she has been favoring her right side more  -Recheck MRI brain was unremarkable  -Adjusted medications: Stopped Xanax.  Continue to taper BuSpar for several more days.  Klonopin weaned down to 0.25 mg in the evening  -PT/OT evaluated here and recommended SNF once stable     Urinary retention  -Urology was able to place a Reece catheter with great difficulty  -Urine culture was negative but she received 3 days of empiric antibiotics  -Urology evaluated and they recommend keeping Reece in for 10 to 14 days.  Voiding trial should only be attempted if patient returns to baseline and if it is felt that she needs Reece long-term then SPT placement should be considered  -Please call [facility] closer to anticipated discharge to arrange outpatient voiding trial [phone]      Rhabdomyolysis  -Patient was found facedown on her bed for an unknown amount of time  -CK trending down  -Lipitor discontinued as no benefit at her age     TAMAR  -Improving with IV fluids      Hypertension  -Continue Norvasc     Anxiety and depression  -Continue BuSpar Paxil.  Wean off Klonopin     Dementia  -Was residing in assisted living  -Continue Namenda and Aricept     Hypokalemia  -Replete and monitor        PENDING TEST RESULTS:   At the time of discharge the following test results are still pending: None    FOLLOW UP APPOINTMENTS:    Debbie Brennan MD  [id number] [id number] [phone]    Follow up in 1 week(s)  Discharge follow up         DIET: Regular    ACTIVITY: As tolerated      DISCHARGE MEDICATIONS:     Medication List        START taking these medications      polyethylene glycol 17 g packet  Commonly known as: GLYCOLAX  Take 1 packet by mouth daily as needed for Constipation            CHANGE how you take these medications      busPIRone 5 MG tablet  Commonly known as: BUSPAR  Take 1 tablet by mouth 2 times daily for 2 days, THEN 1 tablet daily for 3 days.  Start taking on: April 5, 2024  What changed: See the new instructions.     clonazePAM 0.5 MG tablet  Commonly known as: KLONOPIN  Take 0.5 tablets by mouth Daily with supper for 30 days. Max Daily Amount: 0.25 mg  What changed:   how much to take  when to take this            CONTINUE taking these medications      acetaminophen 500 MG tablet  Commonly known as: TYLENOL     amLODIPine 5 MG tablet  Commonly known as: NORVASC     aspirin 81 MG chewable tablet     [place] Senior Tabs     docusate sodium 100 MG capsule  Commonly known as: COLACE     donepezil 5 MG tablet  Commonly known as: ARICEPT     Lutein 20 MG Caps     Magnesium 400 MG Tabs     memantine 5 MG tablet  Commonly known as: NAMENDA     omeprazole 40 MG delayed release capsule  Commonly known as: PRILOSEC     ondansetron 4 MG disintegrating tablet  Commonly known as: ZOFRAN-ODT     potassium chloride 20 MEQ packet  Commonly known as: KLOR-CON            STOP taking these medications      ALPRAZolam 0.5 MG tablet  Commonly known as:

## 2024-04-05 NOTE — CARE COORDINATION
Care Management Discharge Note:      04/05/24 1621   Discharge Planning   Patient expects to be discharged to: Skilled nursing facility   Services At/After Discharge   Transition of Care Consult (CM Consult) SNF   Services At/After Discharge Skilled Nursing Facility (SNF)  ([facility])   Mode of Transport at Discharge BLS  (H2H)   Hospital Transport Time of Discharge 1915   Confirm Follow Up Transport Family     Patient will dc to [facility] for SNF rehab. Patient is going to room 302. RN to call report to [phone] ext [phone]. Transportation is set with [facility] via stretcher with P/U @ 7:15 pm. DC packet for transportation placed on chart. CM updated son, admissions director Mily @ [facility] and primary RN Angelica Flores. Primary RN to fax COVID results to facility when calling report.   ______________________  Ashley EDGE, RN  Care Management  4/5/2024  4:32 PM

## 2024-04-05 NOTE — PLAN OF CARE
Problem: Occupational Therapy - Adult  Goal: By Discharge: Performs self-care activities at highest level of function for planned discharge setting.  See evaluation for individualized goals.  Description: FUNCTIONAL STATUS PRIOR TO ADMISSION: Patient was ambulatory and active without use of DME in memory care unit. Multiple hospital admissions in the past week, 3/25 - 3/28; 3/30 & 4/1.     HOME SUPPORT PRIOR TO ADMISSION: The patient lived with staff at memory care unit. Son reports multiple ground level falls since 3/28           Occupational Therapy Goals:  Initiated 4/2/2024  1.  Patient will perform lower body dressing with Minimal Assist within 7 day(s).  2.  Patient will perform grooming, seated EOB,  with Supervision within 7 day(s).  3.  Patient will perform toilet transfers with Minimal Assist  within 7 day(s).  4.  Patient will perform all aspects of toileting with Minimal Assist within 7 day(s).  5.  Patient will participate in upper extremity therapeutic exercise/activities with Supervision for 10 minutes within 7 day(s).    6  Patient will utilize energy conservation techniques during functional activities with verbal cues within 7 day(s).    Outcome: Progressing   OCCUPATIONAL THERAPY TREATMENT  Patient: Yumi Sandoval (88 y.o. female)  Date: 4/5/2024  Primary Diagnosis: Rhabdomyolysis [M62.82]  Generalized weakness [R53.1]  TAMAR (acute kidney injury) (HCC) [N17.9]  Frequent falls [R29.6]  Non-traumatic rhabdomyolysis [M62.82]       Precautions: Fall Risk                Chart, occupational therapy assessment, plan of care, and goals were reviewed.    ASSESSMENT  Patient continues to benefit from skilled OT services and is progressing towards goals. Patient with improved alertness, strength, and tolerance for activity today. Baseline cognition remains a limiting factor but she is markedly improved from yesterday's session. At EOB initially with unsteadiness though no LOB, but with unilateral support on  bed rail patient with improved stability able to unilaterally reach outside MARQUISE to facilitate anterior weightshift in prep for SPT to commode and right herself back to midline sitting with minimal tactile cueing. Displays freezing behaviors +fear of falling with transfers requiring up to Mod Ax2 d/t poor sequencing and forward flexed posture. Total A for hygiene on commode prior to transfer to chair. She continues to show improvement but is still below baseline for functional mobility and will benefit from SNF level rehab to improve mobility status prior to return to her facility.          PLAN :  Patient continues to benefit from skilled intervention to address the above impairments.  Continue treatment per established plan of care to address goals.    Recommend with staff: 2 person SPT to BS for toileting. OOB 3x/daily for meals, needs feed assist    Recommend next OT session: work on feeding and grooming ADLs    Recommendation for discharge: (in order for the patient to meet his/her long term goals): Therapy up to 5 days/week in Skilled nursing facility    Other factors to consider for discharge: patient's current support system is unable to meet their requirements for physical assistance, poor safety awareness, impaired cognition, high risk for falls, not safe to be alone, and concern for safely navigating or managing the home environment    IF patient discharges home will need the following DME: continuing to assess with progress       SUBJECTIVE:   Patient stated “I don't want to be here.”    OBJECTIVE DATA SUMMARY:   Cognitive/Behavioral Status:  Orientation  Overall Orientation Status: Impaired  Orientation Level: Oriented to person  Cognition  Overall Cognitive Status: Exceptions  Arousal/Alertness: Delayed responses to stimuli  Following Commands: Follows one step commands with increased time;Follows one step commands with repetition  Attention Span: Attends with cues to redirect;Difficulty attending to

## 2024-04-05 NOTE — PROGRESS NOTES
Called Dixon Rosado- Patient's son to fill out patient's MRI screening form. Mr. Dixon's give permission to this RN to sign form.

## 2024-04-05 NOTE — CARE COORDINATION
Care Management Progress Note:   Patient accepted at [facility] for SNF rehab, pending bed availability. Patient needs Rapid COVID 24/hrs prior to dc. Potential for bed today, KIRSTEN king served MD for order for COVID screen. CM following.  ______________________  Ashley EDGE, RN  Care Management  4/5/2024  11:35 AM

## 2024-04-05 NOTE — PROGRESS NOTES
Report called to nurse Rivero at [facility]. Opportunity given for any questions or concerns.     1926pm patient being transported to [facility] by [facility]

## 2024-04-05 NOTE — PLAN OF CARE
Problem: Physical Therapy - Adult  Goal: By Discharge: Performs mobility at highest level of function for planned discharge setting.  See evaluation for individualized goals.  Description:  FUNCTIONAL STATUS PRIOR TO ADMISSION: Patient was ambulatory and active without use of DME in memory care unit. Multiple hospital admissions in the past week, 3/25 - 3/28; 3/30 & 4/1.     HOME SUPPORT PRIOR TO ADMISSION: The patient lived with staff at [facility] unit. Son reports multiple ground level falls since 3/28       Physical Therapy Goals  Initiated 4/2/2024  1.  Patient will move from supine to sit and sit to supine, scoot up and down, and roll side to side in bed with supervision/set-up within 7 day(s).    2.  Patient will perform sit to stand with contact guard assist within 7 day(s).  3.  Patient will transfer from bed to chair and chair to bed with contact guard assist using the least restrictive device within 7 day(s).  4.  Patient will ambulate with contact guard assist for 100 feet with the least restrictive device within 7 day(s).       Outcome: Progressing     PHYSICAL THERAPY TREATMENT    Patient: Yumi Sandoval (88 y.o. female)  Date: 4/5/2024  Diagnosis: Rhabdomyolysis [M62.82]  Generalized weakness [R53.1]  TAMAR (acute kidney injury) (HCC) [N17.9]  Frequent falls [R29.6]  Non-traumatic rhabdomyolysis [M62.82] Rhabdomyolysis      Precautions: Fall Risk                      ASSESSMENT:  Patient continues to benefit from skilled PT services and is slowly progressing towards goals. Patient with significant improvement in her posture/midline alignment today at rest and throughout activity for trunk and head position. Patient still mobilizing very slowly but continues to demonstrate improvement overall. Patient performed several sit<->stand transfers at edge of bed with bilateral UE support on bedrail and then performed stand pivot transfer to commode and stand step pivot commode to recliner with moderate assist  Training  Transfer Training: Yes  Overall Level of Assistance: Moderate assistance;Assist X2  Interventions: Safety awareness training;Tactile cues;Verbal cues;Visual cues;Weight shifting training/pressure relief  Sit to Stand: Moderate assistance;Additional time;Assist X1  Stand to Sit: Moderate assistance;Assist X1;Additional time  Stand Pivot Transfers: Moderate assistance;Assist X2;Additional time  Bed to Chair: Moderate assistance;Assist X2;Additional time  Sit<->stand from edge of bed x 5 reps with bilateral UE support on rolling walker: cues/assist for widening base of support  Stand pivot bed->commode and commode->chair stand step pivot with moderate assist x 2, cues/assist for safety and sequencing  Balance:  Balance  Sitting: Impaired  Sitting - Static: Fair (occasional)  Sitting - Dynamic: Fair (occasional);Poor (constant support)  Standing: Impaired  Standing - Static: Constant support;Poor;Fair  Standing - Dynamic: Constant support;Poor  Standing balance postural retrainning for upright posture and head up positioning with good return demo but unable to initiate on her own    Ambulation/Gait Training:     Gait  Gait Training: Yes  Overall Level of Assistance: Moderate assistance;Assist X2  Distance (ft): 2 Feet  Assistive Device: Gait belt (bedrail/commode armrest)  Interventions: Safety awareness training;Tactile cues;Verbal cues;Visual cues;Weight shifting training/pressure relief  Base of Support: Narrowed  Speed/[place]: Shuffled  Neuro Re-Education:   Sitting balance improved with mostly midline position / neutral head position with only very slight right tilt  Standing balance anterolateral posture/lean, downward gaze - able to correct with verbal/visual/tactile cues                 Pain Rating:  denied   Pain Intervention(s):       Activity Tolerance:   Fair     After treatment:   Patient left in no apparent distress sitting up in chair, Call bell within reach, Bed/ chair alarm activated,

## 2024-04-05 NOTE — PLAN OF CARE
Problem: Safety - Adult  Goal: Free from fall injury  4/5/2024 0107 by Jorge Goncalves, RN  Outcome: Progressing  4/4/2024 1902 by Shanon Alberts, RN  Outcome: Progressing     Problem: Physical Therapy - Adult  Goal: By Discharge: Performs mobility at highest level of function for planned discharge setting.  See evaluation for individualized goals.  Description:  FUNCTIONAL STATUS PRIOR TO ADMISSION: Patient was ambulatory and active without use of DME in memory care unit. Multiple hospital admissions in the past week, 3/25 - 3/28; 3/30 & 4/1.     HOME SUPPORT PRIOR TO ADMISSION: The patient lived with staff at memory care unit. Son reports multiple ground level falls since 3/28       Physical Therapy Goals  Initiated 4/2/2024  1.  Patient will move from supine to sit and sit to supine, scoot up and down, and roll side to side in bed with supervision/set-up within 7 day(s).    2.  Patient will perform sit to stand with contact guard assist within 7 day(s).  3.  Patient will transfer from bed to chair and chair to bed with contact guard assist using the least restrictive device within 7 day(s).  4.  Patient will ambulate with contact guard assist for 100 feet with the least restrictive device within 7 day(s).       4/4/2024 1422 by Nicolasa Mcgowan PT  Outcome: Progressing     Problem: Confusion  Goal: Confusion, delirium, dementia, or psychosis is improved or at baseline  Description: INTERVENTIONS:  1. Assess for possible contributors to thought disturbance, including medications, impaired vision or hearing, underlying metabolic abnormalities, dehydration, psychiatric diagnoses, and notify attending LIP  2. [place] high risk fall precautions, as indicated  3. Provide frequent short contacts to provide reality reorientation, refocusing and direction  4. Decrease environmental stimuli, including noise as appropriate  5. Monitor and intervene to maintain adequate nutrition, hydration, elimination, sleep and

## 2024-04-27 ENCOUNTER — HOSPITAL ENCOUNTER (EMERGENCY)
Facility: HOSPITAL | Age: 89
Discharge: HOME | End: 2024-04-27
Attending: EMERGENCY MEDICINE
Payer: MEDICARE

## 2024-04-27 VITALS
DIASTOLIC BLOOD PRESSURE: 65 MMHG | RESPIRATION RATE: 16 BRPM | HEART RATE: 82 BPM | HEIGHT: 60 IN | OXYGEN SATURATION: 100 % | SYSTOLIC BLOOD PRESSURE: 175 MMHG | WEIGHT: 118.17 LBS | BODY MASS INDEX: 23.2 KG/M2 | TEMPERATURE: 97.9 F

## 2024-04-27 DIAGNOSIS — R33.8 ACUTE URINARY RETENTION: Primary | ICD-10-CM

## 2024-04-27 LAB
ALBUMIN SERPL-MCNC: 3 G/DL (ref 3.5–5)
ALBUMIN/GLOB SERPL: 0.8 (ref 1.1–2.2)
ALP SERPL-CCNC: 78 U/L (ref 45–117)
ALT SERPL-CCNC: 25 U/L (ref 12–78)
ANION GAP SERPL CALC-SCNC: 2 MMOL/L (ref 5–15)
APPEARANCE UR: CLEAR
AST SERPL-CCNC: 22 U/L (ref 15–37)
BACTERIA URNS QL MICRO: ABNORMAL /HPF
BASOPHILS # BLD: 0 K/UL (ref 0–0.1)
BASOPHILS NFR BLD: 0 % (ref 0–1)
BILIRUB SERPL-MCNC: 0.3 MG/DL (ref 0.2–1)
BILIRUB UR QL: NEGATIVE
BUN SERPL-MCNC: 18 MG/DL (ref 6–20)
BUN/CREAT SERPL: 16 (ref 12–20)
CALCIUM SERPL-MCNC: 9.8 MG/DL (ref 8.5–10.1)
CHLORIDE SERPL-SCNC: 104 MMOL/L (ref 97–108)
CO2 SERPL-SCNC: 32 MMOL/L (ref 21–32)
COLOR UR: ABNORMAL
CREAT SERPL-MCNC: 1.11 MG/DL (ref 0.55–1.02)
DIFFERENTIAL METHOD BLD: NORMAL
EOSINOPHIL # BLD: 0.1 K/UL (ref 0–0.4)
EOSINOPHIL NFR BLD: 1 % (ref 0–7)
EPITH CASTS URNS QL MICRO: ABNORMAL /LPF
ERYTHROCYTE [DISTWIDTH] IN BLOOD BY AUTOMATED COUNT: 13.8 % (ref 11.5–14.5)
GLOBULIN SER CALC-MCNC: 3.8 G/DL (ref 2–4)
GLUCOSE SERPL-MCNC: 149 MG/DL (ref 65–100)
GLUCOSE UR STRIP.AUTO-MCNC: NEGATIVE MG/DL
HCT VFR BLD AUTO: 36.7 % (ref 35–47)
HGB BLD-MCNC: 11.9 G/DL (ref 11.5–16)
HGB UR QL STRIP: ABNORMAL
HYALINE CASTS URNS QL MICRO: ABNORMAL /LPF (ref 0–2)
IMM GRANULOCYTES # BLD AUTO: 0 K/UL (ref 0–0.04)
IMM GRANULOCYTES NFR BLD AUTO: 0 % (ref 0–0.5)
KETONES UR QL STRIP.AUTO: NEGATIVE MG/DL
LEUKOCYTE ESTERASE UR QL STRIP.AUTO: ABNORMAL
LYMPHOCYTES # BLD: 2.4 K/UL (ref 0.8–3.5)
LYMPHOCYTES NFR BLD: 23 % (ref 12–49)
MCH RBC QN AUTO: 27.6 PG (ref 26–34)
MCHC RBC AUTO-ENTMCNC: 32.4 G/DL (ref 30–36.5)
MCV RBC AUTO: 85.2 FL (ref 80–99)
MONOCYTES # BLD: 0.7 K/UL (ref 0–1)
MONOCYTES NFR BLD: 7 % (ref 5–13)
NEUTS SEG # BLD: 7.2 K/UL (ref 1.8–8)
NEUTS SEG NFR BLD: 69 % (ref 32–75)
NITRITE UR QL STRIP.AUTO: POSITIVE
NRBC # BLD: 0 K/UL (ref 0–0.01)
NRBC BLD-RTO: 0 PER 100 WBC
PH UR STRIP: 7.5 (ref 5–8)
PLATELET # BLD AUTO: 273 K/UL (ref 150–400)
PMV BLD AUTO: 9.9 FL (ref 8.9–12.9)
POTASSIUM SERPL-SCNC: 3.6 MMOL/L (ref 3.5–5.1)
PROT SERPL-MCNC: 6.8 G/DL (ref 6.4–8.2)
PROT UR STRIP-MCNC: ABNORMAL MG/DL
RBC # BLD AUTO: 4.31 M/UL (ref 3.8–5.2)
RBC #/AREA URNS HPF: ABNORMAL /HPF (ref 0–5)
SODIUM SERPL-SCNC: 138 MMOL/L (ref 136–145)
SP GR UR REFRACTOMETRY: 1.01 (ref 1–1.03)
URINE CULTURE IF INDICATED: ABNORMAL
UROBILINOGEN UR QL STRIP.AUTO: 0.2 EU/DL (ref 0.2–1)
WBC # BLD AUTO: 10.5 K/UL (ref 3.6–11)
WBC URNS QL MICRO: ABNORMAL /HPF (ref 0–4)

## 2024-04-27 PROCEDURE — 81001 URINALYSIS AUTO W/SCOPE: CPT

## 2024-04-27 PROCEDURE — 87086 URINE CULTURE/COLONY COUNT: CPT

## 2024-04-27 PROCEDURE — 2580000003 HC RX 258: Performed by: EMERGENCY MEDICINE

## 2024-04-27 PROCEDURE — 51798 US URINE CAPACITY MEASURE: CPT

## 2024-04-27 PROCEDURE — 36415 COLL VENOUS BLD VENIPUNCTURE: CPT

## 2024-04-27 PROCEDURE — 80053 COMPREHEN METABOLIC PANEL: CPT

## 2024-04-27 PROCEDURE — 99284 EMERGENCY DEPT VISIT MOD MDM: CPT

## 2024-04-27 PROCEDURE — 51702 INSERT TEMP BLADDER CATH: CPT

## 2024-04-27 PROCEDURE — 85025 COMPLETE CBC W/AUTO DIFF WBC: CPT

## 2024-04-27 RX ORDER — CEFDINIR 300 MG/1
300 CAPSULE ORAL 2 TIMES DAILY
Qty: 14 CAPSULE | Refills: 0 | Status: SHIPPED | OUTPATIENT
Start: 2024-04-27 | End: 2024-05-04

## 2024-04-27 RX ORDER — 0.9 % SODIUM CHLORIDE 0.9 %
1000 INTRAVENOUS SOLUTION INTRAVENOUS ONCE
Status: COMPLETED | OUTPATIENT
Start: 2024-04-27 | End: 2024-04-27

## 2024-04-27 RX ADMIN — SODIUM CHLORIDE 1000 ML: 9 INJECTION, SOLUTION INTRAVENOUS at 19:50

## 2024-04-27 ASSESSMENT — PAIN - FUNCTIONAL ASSESSMENT: PAIN_FUNCTIONAL_ASSESSMENT: NONE - DENIES PAIN

## 2024-04-27 NOTE — ED TRIAGE NOTES
Pt arrives from [facility] by EMS for c/o urinary retention. Has not been able to void all day. Staff attempted to place a cathter x3 today with no success. Family requested her to be seen here.

## 2024-04-28 NOTE — ED PROVIDER NOTES
[facility] EMERGENCY DEPT  EMERGENCY DEPARTMENT ENCOUNTER      Pt Name: Yumi Sandoval  MRN: [id number]  Birthdate 1935  Date of evaluation: 4/27/2024  Provider: Fernandez Underwood MD      HISTORY OF PRESENT ILLNESS      88-year-old female with history of dementia presenting the ER from an outside nursing facility for decreased urine output.  They reports she has not had any urine output since this morning.  Bladder scan here in the ER shows about 360 cc of urine.  Patient denies any pain.  She is oriented to person only.              Nursing Notes were reviewed.    REVIEW OF SYSTEMS         Review of Systems   Unable to perform ROS: Dementia           PAST MEDICAL HISTORY     Past Medical History:   Diagnosis Date    Anxiety disorder     Constipation     Essential hypertension     Memory disorder     short term         SURGICAL HISTORY       Past Surgical History:   Procedure Laterality Date    APPENDECTOMY      TONSILLECTOMY           CURRENT MEDICATIONS       Previous Medications    ACETAMINOPHEN (TYLENOL) 500 MG TABLET    Take 1 tablet by mouth every 4 hours as needed for Pain    AMLODIPINE (NORVASC) 5 MG TABLET    Take 1 tablet by mouth daily    ASPIRIN 81 MG CHEWABLE TABLET    Take 1 tablet by mouth daily    CLONAZEPAM (KLONOPIN) 0.5 MG TABLET    Take 0.5 tablets by mouth Daily with supper for 30 days. Max Daily Amount: 0.25 mg    DOCUSATE SODIUM (COLACE) 100 MG CAPSULE    Take 1 capsule by mouth 2 times daily Indications: Constipation Hold for loose stools    DONEPEZIL (ARICEPT) 5 MG TABLET    Take 1 tablet by mouth nightly    LUTEIN 20 MG CAPS    Take by mouth    MAGNESIUM 400 MG TABS    Take 400 mg by mouth every evening With meals    MEMANTINE (NAMENDA) 5 MG TABLET    Take 1 tablet by mouth 2 times daily    MULTIPLE VITAMINS-MINERALS (CEROVITE SENIOR) TABS    Take 1 tablet by mouth daily    OMEPRAZOLE (PRILOSEC) 40 MG DELAYED RELEASE CAPSULE    Take 1 capsule by mouth daily    ONDANSETRON (ZOFRAN-ODT) 4 MG

## 2024-04-28 NOTE — DISCHARGE INSTRUCTIONS
Patient needs follow-up with urology for evaluation of urinary retention in new indwelling Reece catheter.  Please take antibiotic as prescribed

## 2024-04-28 NOTE — ED NOTES
Lauren CANTRELL and Dr. Mora Sheth([place]) @ [facility] notified of pt's results and dispo.  H2H ETA 7721.

## 2024-04-30 LAB
BACTERIA SPEC CULT: NORMAL
CC UR VC: NORMAL
SERVICE CMNT-IMP: NORMAL

## 2024-05-08 ENCOUNTER — HOSPITAL ENCOUNTER (EMERGENCY)
Facility: HOSPITAL | Age: 89
Discharge: HOME | End: 2024-05-09
Attending: STUDENT IN AN ORGANIZED HEALTH CARE EDUCATION/TRAINING PROGRAM
Payer: MEDICARE

## 2024-05-08 ENCOUNTER — APPOINTMENT (OUTPATIENT)
Facility: HOSPITAL | Age: 89
End: 2024-05-08
Payer: MEDICARE
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
## 2024-05-08 DIAGNOSIS — S09.90XA CLOSED HEAD INJURY, INITIAL ENCOUNTER: ICD-10-CM

## 2024-05-08 DIAGNOSIS — R29.6 FREQUENT FALLS: ICD-10-CM

## 2024-05-08 DIAGNOSIS — E86.0 DEHYDRATION: ICD-10-CM

## 2024-05-08 DIAGNOSIS — S01.81XA FACIAL LACERATION, INITIAL ENCOUNTER: ICD-10-CM

## 2024-05-08 DIAGNOSIS — Z86.59 HISTORY OF DEMENTIA: ICD-10-CM

## 2024-05-08 LAB
ALBUMIN SERPL-MCNC: 3.4 G/DL (ref 3.5–5)
ALBUMIN/GLOB SERPL: 0.8 (ref 1.1–2.2)
ALP SERPL-CCNC: 81 U/L (ref 45–117)
ALT SERPL-CCNC: 26 U/L (ref 12–78)
ANION GAP SERPL CALC-SCNC: 7 MMOL/L (ref 5–15)
AST SERPL-CCNC: 21 U/L (ref 15–37)
BASOPHILS # BLD: 0 K/UL (ref 0–0.1)
BASOPHILS NFR BLD: 0 % (ref 0–1)
BILIRUB SERPL-MCNC: 0.5 MG/DL (ref 0.2–1)
BUN SERPL-MCNC: 28 MG/DL (ref 6–20)
BUN/CREAT SERPL: 22 (ref 12–20)
CALCIUM SERPL-MCNC: 9.9 MG/DL (ref 8.5–10.1)
CHLORIDE SERPL-SCNC: 107 MMOL/L (ref 97–108)
CO2 SERPL-SCNC: 27 MMOL/L (ref 21–32)
COMMENT:: NORMAL
CREAT SERPL-MCNC: 1.26 MG/DL (ref 0.55–1.02)
DIFFERENTIAL METHOD BLD: ABNORMAL
EOSINOPHIL # BLD: 0 K/UL (ref 0–0.4)
EOSINOPHIL NFR BLD: 0 % (ref 0–7)
ERYTHROCYTE [DISTWIDTH] IN BLOOD BY AUTOMATED COUNT: 14.3 % (ref 11.5–14.5)
GLOBULIN SER CALC-MCNC: 4.1 G/DL (ref 2–4)
GLUCOSE SERPL-MCNC: 138 MG/DL (ref 65–100)
HCT VFR BLD AUTO: 38.1 % (ref 35–47)
HGB BLD-MCNC: 12.4 G/DL (ref 11.5–16)
IMM GRANULOCYTES # BLD AUTO: 0.1 K/UL (ref 0–0.04)
IMM GRANULOCYTES NFR BLD AUTO: 1 % (ref 0–0.5)
LYMPHOCYTES # BLD: 1.8 K/UL (ref 0.8–3.5)
LYMPHOCYTES NFR BLD: 16 % (ref 12–49)
MCH RBC QN AUTO: 27.6 PG (ref 26–34)
MCHC RBC AUTO-ENTMCNC: 32.5 G/DL (ref 30–36.5)
MCV RBC AUTO: 84.7 FL (ref 80–99)
MONOCYTES # BLD: 0.6 K/UL (ref 0–1)
MONOCYTES NFR BLD: 6 % (ref 5–13)
NEUTS SEG # BLD: 8.7 K/UL (ref 1.8–8)
NEUTS SEG NFR BLD: 77 % (ref 32–75)
NRBC # BLD: 0 K/UL (ref 0–0.01)
NRBC BLD-RTO: 0 PER 100 WBC
PLATELET # BLD AUTO: 333 K/UL (ref 150–400)
PMV BLD AUTO: 9.9 FL (ref 8.9–12.9)
POTASSIUM SERPL-SCNC: 3.4 MMOL/L (ref 3.5–5.1)
PROT SERPL-MCNC: 7.5 G/DL (ref 6.4–8.2)
RBC # BLD AUTO: 4.5 M/UL (ref 3.8–5.2)
SODIUM SERPL-SCNC: 141 MMOL/L (ref 136–145)
SPECIMEN HOLD: NORMAL
WBC # BLD AUTO: 11.3 K/UL (ref 3.6–11)

## 2024-05-08 PROCEDURE — 70450 CT HEAD/BRAIN W/O DYE: CPT

## 2024-05-08 PROCEDURE — 80053 COMPREHEN METABOLIC PANEL: CPT

## 2024-05-08 PROCEDURE — 2580000003 HC RX 258: Performed by: STUDENT IN AN ORGANIZED HEALTH CARE EDUCATION/TRAINING PROGRAM

## 2024-05-08 PROCEDURE — 72125 CT NECK SPINE W/O DYE: CPT

## 2024-05-08 PROCEDURE — 36415 COLL VENOUS BLD VENIPUNCTURE: CPT

## 2024-05-08 PROCEDURE — 90471 IMMUNIZATION ADMIN: CPT | Performed by: STUDENT IN AN ORGANIZED HEALTH CARE EDUCATION/TRAINING PROGRAM

## 2024-05-08 PROCEDURE — 90714 TD VACC NO PRESV 7 YRS+ IM: CPT | Performed by: STUDENT IN AN ORGANIZED HEALTH CARE EDUCATION/TRAINING PROGRAM

## 2024-05-08 PROCEDURE — 93005 ELECTROCARDIOGRAM TRACING: CPT | Performed by: STUDENT IN AN ORGANIZED HEALTH CARE EDUCATION/TRAINING PROGRAM

## 2024-05-08 PROCEDURE — 12013 RPR F/E/E/N/L/M 2.6-5.0 CM: CPT

## 2024-05-08 PROCEDURE — 99284 EMERGENCY DEPT VISIT MOD MDM: CPT

## 2024-05-08 PROCEDURE — 96360 HYDRATION IV INFUSION INIT: CPT

## 2024-05-08 PROCEDURE — 6360000002 HC RX W HCPCS: Performed by: STUDENT IN AN ORGANIZED HEALTH CARE EDUCATION/TRAINING PROGRAM

## 2024-05-08 PROCEDURE — 85025 COMPLETE CBC W/AUTO DIFF WBC: CPT

## 2024-05-08 PROCEDURE — 6370000000 HC RX 637 (ALT 250 FOR IP): Performed by: STUDENT IN AN ORGANIZED HEALTH CARE EDUCATION/TRAINING PROGRAM

## 2024-05-08 RX ORDER — 0.9 % SODIUM CHLORIDE 0.9 %
500 INTRAVENOUS SOLUTION INTRAVENOUS ONCE
Status: COMPLETED | OUTPATIENT
Start: 2024-05-08 | End: 2024-05-08

## 2024-05-08 RX ORDER — ACETAMINOPHEN 500 MG
1000 TABLET ORAL
Status: COMPLETED | OUTPATIENT
Start: 2024-05-08 | End: 2024-05-08

## 2024-05-08 RX ORDER — TETANUS AND DIPHTHERIA TOXOIDS ADSORBED 2; 2 [LF]/.5ML; [LF]/.5ML
0.5 INJECTION INTRAMUSCULAR ONCE
Status: COMPLETED | OUTPATIENT
Start: 2024-05-08 | End: 2024-05-08

## 2024-05-08 RX ADMIN — ACETAMINOPHEN 1000 MG: 500 TABLET ORAL at 22:22

## 2024-05-08 RX ADMIN — SODIUM CHLORIDE 500 ML: 9 INJECTION, SOLUTION INTRAVENOUS at 21:25

## 2024-05-08 RX ADMIN — TETANUS AND DIPHTHERIA TOXOIDS ADSORBED 0.5 ML: 2; 2 INJECTION INTRAMUSCULAR at 22:25

## 2024-05-08 ASSESSMENT — PAIN DESCRIPTION - LOCATION
LOCATION: HEAD
LOCATION: HEAD

## 2024-05-08 ASSESSMENT — PAIN DESCRIPTION - ORIENTATION: ORIENTATION: ANTERIOR

## 2024-05-08 ASSESSMENT — PAIN SCALES - GENERAL
PAINLEVEL_OUTOF10: 3
PAINLEVEL_OUTOF10: 5

## 2024-05-08 ASSESSMENT — PAIN DESCRIPTION - DESCRIPTORS
DESCRIPTORS: ACHING
DESCRIPTORS: DISCOMFORT

## 2024-05-08 ASSESSMENT — PAIN - FUNCTIONAL ASSESSMENT: PAIN_FUNCTIONAL_ASSESSMENT: 0-10

## 2024-05-08 NOTE — ED NOTES
Noticed some asymmetry to face and patient repeating words, informed provider, pt has history of facial droop and dementia in chart, provider to order CT head.

## 2024-05-08 NOTE — ED TRIAGE NOTES
Pt arrives by ems with the c.c. of rolling out of bed and fell and hit head, unknown loss consciousness, pt lives on independent side, ems reports ran patient earlier today for another fall to [place] [facility] pt does not remember either fall.  Pt does not take blood thinners. Pt is complaining of head pain.

## 2024-05-09 VITALS
HEART RATE: 89 BPM | WEIGHT: 118 LBS | OXYGEN SATURATION: 98 % | DIASTOLIC BLOOD PRESSURE: 91 MMHG | SYSTOLIC BLOOD PRESSURE: 132 MMHG | TEMPERATURE: 97.6 F | RESPIRATION RATE: 20 BRPM | BODY MASS INDEX: 23.16 KG/M2 | HEIGHT: 60 IN

## 2024-05-09 LAB
EKG ATRIAL RATE: 84 BPM
EKG DIAGNOSIS: NORMAL
EKG P AXIS: 49 DEGREES
EKG P-R INTERVAL: 144 MS
EKG Q-T INTERVAL: 382 MS
EKG QRS DURATION: 84 MS
EKG QTC CALCULATION (BAZETT): 451 MS
EKG R AXIS: -12 DEGREES
EKG T AXIS: 49 DEGREES
EKG VENTRICULAR RATE: 84 BPM

## 2024-05-09 ASSESSMENT — PAIN SCALES - GENERAL: PAINLEVEL_OUTOF10: 0

## 2024-05-09 NOTE — ED PROVIDER NOTES
mis-transcribed.)    Karin Moore MD (electronically signed)  Emergency Attending Physician / Physician Assistant / Nurse Practitioner              Karin Moore MD  05/09/24 0021

## 2024-05-09 NOTE — DISCHARGE INSTRUCTIONS
Please have your stitches removed in approximately 5-7 days. You can do this with your primary care physician, urgent care, or return to the ER for stitch removal.

## 2024-05-09 NOTE — ED NOTES
PROCEDURE NOTE - LACERATION REPAIR:  10:16 PM  Procedure by MJ Horowitz  Complexity: simple   5cm linear laceration to face (right eyebrow) was irrigated copiously with NS under jet lavage, prepped with Shur-Clens and draped in a sterile fashion.  The area was anesthetized via local infiltration of 3 mL Lidocaine 1%.  The wound was explored with the following results: No foreign bodies found.  The wound was repaired with One layer suture closure: Skin Layer:  8 sutures placed, stitch type:simple interrupted, suture: 5-0 nylon..  The wound was closed with good hemostasis and approximation.  Sterile dressing applied.    Estimated blood loss: minimal  The procedure took 16-30 minutes, and pt tolerated well.         Nely Crabtree PA  05/08/24 6488

## 2024-05-09 NOTE — ED NOTES
Discharge instruction given to patient with son on bed side. No complain of pain. Patient is not in distress. Patient is ambulatory.

## 2024-05-09 NOTE — ED NOTES
Called her son Dixon to give an updates from her mom. Her son will gonna pick her up here in less than an hour.

## 2024-07-12 ENCOUNTER — APPOINTMENT (OUTPATIENT)
Facility: HOSPITAL | Age: 89
End: 2024-07-12
Payer: MEDICARE

## 2024-07-12 ENCOUNTER — HOSPITAL ENCOUNTER (EMERGENCY)
Facility: HOSPITAL | Age: 89
Discharge: NURSING, REHAB OR LONG-TERM CARE | End: 2024-07-12
Attending: STUDENT IN AN ORGANIZED HEALTH CARE EDUCATION/TRAINING PROGRAM
Payer: MEDICARE

## 2024-07-12 VITALS
SYSTOLIC BLOOD PRESSURE: 123 MMHG | RESPIRATION RATE: 19 BRPM | WEIGHT: 118 LBS | TEMPERATURE: 97.8 F | HEART RATE: 103 BPM | HEIGHT: 60 IN | DIASTOLIC BLOOD PRESSURE: 60 MMHG | OXYGEN SATURATION: 98 % | BODY MASS INDEX: 23.16 KG/M2

## 2024-07-12 DIAGNOSIS — T83.9XXA PROBLEM WITH FOLEY CATHETER, INITIAL ENCOUNTER (HCC): ICD-10-CM

## 2024-07-12 DIAGNOSIS — M79.602 LEFT ARM PAIN: Primary | ICD-10-CM

## 2024-07-12 LAB
ALBUMIN SERPL-MCNC: 2.7 G/DL (ref 3.5–5)
ALBUMIN/GLOB SERPL: 0.7 (ref 1.1–2.2)
ALP SERPL-CCNC: 110 U/L (ref 45–117)
ALT SERPL-CCNC: 17 U/L (ref 12–78)
ANION GAP SERPL CALC-SCNC: 8 MMOL/L (ref 5–15)
AST SERPL-CCNC: 26 U/L (ref 15–37)
BASOPHILS # BLD: 0.1 K/UL (ref 0–0.1)
BASOPHILS NFR BLD: 1 % (ref 0–1)
BILIRUB SERPL-MCNC: 0.5 MG/DL (ref 0.2–1)
BUN SERPL-MCNC: 15 MG/DL (ref 6–20)
BUN/CREAT SERPL: 23 (ref 12–20)
CALCIUM SERPL-MCNC: 9.1 MG/DL (ref 8.5–10.1)
CHLORIDE SERPL-SCNC: 107 MMOL/L (ref 97–108)
CO2 SERPL-SCNC: 26 MMOL/L (ref 21–32)
COMMENT:: NORMAL
CREAT SERPL-MCNC: 0.65 MG/DL (ref 0.55–1.02)
DIFFERENTIAL METHOD BLD: ABNORMAL
EOSINOPHIL # BLD: 0.1 K/UL (ref 0–0.4)
EOSINOPHIL NFR BLD: 1 % (ref 0–7)
ERYTHROCYTE [DISTWIDTH] IN BLOOD BY AUTOMATED COUNT: 14.8 % (ref 11.5–14.5)
GLOBULIN SER CALC-MCNC: 4.1 G/DL (ref 2–4)
GLUCOSE SERPL-MCNC: 82 MG/DL (ref 65–100)
HCT VFR BLD AUTO: 36.5 % (ref 35–47)
HGB BLD-MCNC: 11.8 G/DL (ref 11.5–16)
IMM GRANULOCYTES # BLD AUTO: 0 K/UL (ref 0–0.04)
IMM GRANULOCYTES NFR BLD AUTO: 0 % (ref 0–0.5)
LYMPHOCYTES # BLD: 2.1 K/UL (ref 0.8–3.5)
LYMPHOCYTES NFR BLD: 23 % (ref 12–49)
MCH RBC QN AUTO: 27.9 PG (ref 26–34)
MCHC RBC AUTO-ENTMCNC: 32.3 G/DL (ref 30–36.5)
MCV RBC AUTO: 86.3 FL (ref 80–99)
MONOCYTES # BLD: 0.6 K/UL (ref 0–1)
MONOCYTES NFR BLD: 7 % (ref 5–13)
NEUTS SEG # BLD: 6.1 K/UL (ref 1.8–8)
NEUTS SEG NFR BLD: 68 % (ref 32–75)
NRBC # BLD: 0 K/UL (ref 0–0.01)
NRBC BLD-RTO: 0 PER 100 WBC
PLATELET # BLD AUTO: 304 K/UL (ref 150–400)
PMV BLD AUTO: 10.3 FL (ref 8.9–12.9)
POTASSIUM SERPL-SCNC: 3.6 MMOL/L (ref 3.5–5.1)
PROT SERPL-MCNC: 6.8 G/DL (ref 6.4–8.2)
RBC # BLD AUTO: 4.23 M/UL (ref 3.8–5.2)
SODIUM SERPL-SCNC: 141 MMOL/L (ref 136–145)
SPECIMEN HOLD: NORMAL
WBC # BLD AUTO: 9 K/UL (ref 3.6–11)

## 2024-07-12 PROCEDURE — 51702 INSERT TEMP BLADDER CATH: CPT

## 2024-07-12 PROCEDURE — 99284 EMERGENCY DEPT VISIT MOD MDM: CPT

## 2024-07-12 PROCEDURE — 94761 N-INVAS EAR/PLS OXIMETRY MLT: CPT

## 2024-07-12 PROCEDURE — 29105 APPLICATION LONG ARM SPLINT: CPT

## 2024-07-12 PROCEDURE — 85025 COMPLETE CBC W/AUTO DIFF WBC: CPT

## 2024-07-12 PROCEDURE — 36415 COLL VENOUS BLD VENIPUNCTURE: CPT

## 2024-07-12 PROCEDURE — 73080 X-RAY EXAM OF ELBOW: CPT

## 2024-07-12 PROCEDURE — 2580000003 HC RX 258: Performed by: STUDENT IN AN ORGANIZED HEALTH CARE EDUCATION/TRAINING PROGRAM

## 2024-07-12 PROCEDURE — 80053 COMPREHEN METABOLIC PANEL: CPT

## 2024-07-12 RX ORDER — 0.9 % SODIUM CHLORIDE 0.9 %
500 INTRAVENOUS SOLUTION INTRAVENOUS ONCE
Status: COMPLETED | OUTPATIENT
Start: 2024-07-12 | End: 2024-07-12

## 2024-07-12 RX ADMIN — SODIUM CHLORIDE 500 ML: 9 INJECTION, SOLUTION INTRAVENOUS at 17:13

## 2024-07-12 ASSESSMENT — ENCOUNTER SYMPTOMS
SHORTNESS OF BREATH: 0
ABDOMINAL PAIN: 0

## 2024-07-12 ASSESSMENT — PAIN DESCRIPTION - ORIENTATION: ORIENTATION: LEFT

## 2024-07-12 ASSESSMENT — PAIN SCALES - GENERAL: PAINLEVEL_OUTOF10: 7

## 2024-07-12 ASSESSMENT — PAIN DESCRIPTION - LOCATION: LOCATION: ARM

## 2024-07-12 ASSESSMENT — PAIN - FUNCTIONAL ASSESSMENT: PAIN_FUNCTIONAL_ASSESSMENT: 0-10

## 2024-07-12 NOTE — ED TRIAGE NOTES
Patient arrives to the ED via EMS, EMS reports they were called due to staff not being able to remove urinary catheter and pt having L arm pain.     Hx dementia

## 2024-07-12 NOTE — ED PROVIDER NOTES
Housing Stability Vital Sign     Unable to Pay for Housing in the Last Year: No     Number of Places Lived in the Last Year: 1     Unstable Housing in the Last Year: No           PHYSICAL EXAM    (up to 7 for level 4, 8 or more for level 5)     ED Triage Vitals [07/12/24 1302]   BP Temp Temp src Pulse Respirations SpO2 Height Weight - Scale   98/70 97.8 °F (36.6 °C) -- 80 26 99 % 1.524 m (5') 53.5 kg (118 lb)       Body mass index is 23.05 kg/m².    Physical Exam  Vitals and nursing note reviewed.   Constitutional:       General: She is not in acute distress.     Appearance: She is normal weight.   HENT:      Head: Normocephalic.      Right Ear: External ear normal.      Left Ear: External ear normal.      Nose: Nose normal.   Eyes:      Conjunctiva/sclera: Conjunctivae normal.   Cardiovascular:      Rate and Rhythm: Normal rate and regular rhythm.      Pulses: Normal pulses.      Heart sounds: Normal heart sounds. No murmur heard.  Pulmonary:      Effort: Pulmonary effort is normal. No respiratory distress.      Breath sounds: Normal breath sounds.   Abdominal:      General: Abdomen is flat. Bowel sounds are normal.      Tenderness: There is no abdominal tenderness.   Musculoskeletal:      Right lower leg: No edema.      Left lower leg: No edema.      Comments: Abrasions bilateral elbows   Skin:     General: Skin is warm.      Capillary Refill: Capillary refill takes less than 2 seconds.   Neurological:      General: No focal deficit present.      Mental Status: She is alert.   Psychiatric:         Mood and Affect: Mood normal.         DIAGNOSTIC RESULTS     EKG: All EKG's are interpreted by the Emergency Department Physician who either signs or Co-signs this chart in the absence of a cardiologist.        RADIOLOGY:   Non-plain film images such as CT, Ultrasound and MRI are read by the radiologist. Plain radiographic images are visualized and preliminarily interpreted by the emergency physician with the below  findings:        Interpretation per the Radiologist below, if available at the time of this note:    XR ELBOW LEFT (MIN 3 VIEWS)   Final Result   Subtle cortical irregularity is seen along the medial epicondyle. Correlate with   clinical findings for possible fracture.      Electronically signed by TIMOTHY ROGERS           LABS:  Labs Reviewed   CBC WITH AUTO DIFFERENTIAL - Abnormal; Notable for the following components:       Result Value    RDW 14.8 (*)     All other components within normal limits   COMPREHENSIVE METABOLIC PANEL - Abnormal; Notable for the following components:    BUN/Creatinine Ratio 23 (*)     Albumin 2.7 (*)     Globulin 4.1 (*)     Albumin/Globulin Ratio 0.7 (*)     All other components within normal limits   EXTRA TUBES HOLD       All other labs were within normal range or not returned as of this dictation.    EMERGENCY DEPARTMENT COURSE and DIFFERENTIAL DIAGNOSIS/MDM:   Vitals:    Vitals:    07/12/24 2015 07/12/24 2030 07/12/24 2045 07/12/24 2100   BP:       Pulse: (!) 117 (!) 115 (!) 115 (!) 103   Resp: 17 18 17 19   Temp:       SpO2:       Weight:       Height:               Medical Decision Making  Differential includes but not limited to dehydration,    Amount and/or Complexity of Data Reviewed  Labs: ordered.  Radiology: ordered.    Risk  Prescription drug management.            REASSESSMENT     ED Course as of 07/13/24 0701   Fri Jul 12, 2024   1527 Patient appears dry on exam.  Mucous membranes are cracked.  Will establish IV check CBC CMP.  Regarding the patient's left elbow I performed x-ray imaging shows a subtle cortical irregularity at the medial epicondyle.  Reviewed previous imaging.  Specifically 1 year ago and July which shows subtle bony consolidation at the fracture site, previous from that is June 2023 which shows a mildly displaced intra-articular lateral epicondyle fracture.  Discussed case with orthopedics, will put in a splint and have her follow-up with orthopedics. [WG]

## 2024-07-12 NOTE — ED NOTES
88-year-old female with history of dementia received in turnover pending labs and splint application.  She presented today for concern for difficulty changing the Reece catheter at her facility.  She is reportedly on hospice as well as palliative goals of care.  She reports left elbow pain to me.  Previous physician obtain an x-ray which demonstrates potential cortical irregularity at the left elbow with recommendation for a splint.  This was discussed with orthopedics by the prior physician.  Her labs are at baseline.  She will not require hospitalization today.  Will give some pain medicine, place in splint, discharge back to her facility.     Riley Damon MD  07/12/24 1219

## 2024-07-12 NOTE — DISCHARGE INSTRUCTIONS
You were seen in the emergency department for difficulty with placing your Reece catheter.  Reece catheter was placed.  I did x-ray her arm which shows an area which could be a small fracture when compared to your previous x-rays.  Please follow-up with orthopedics above.

## 2024-07-16 NOTE — ED NOTES
Splint Application    Date/Time: 7/12/2024 5:45 PM    Performed by: Riley Damon MD  Authorized by: Riley Damon MD    Consent:     Consent obtained:  Verbal    Consent given by:  Patient  Universal protocol:     Patient identity confirmed:  Arm band  Pre-procedure details:     Distal neurologic exam:  Normal  Procedure details:     Location:  Elbow    Elbow location:  L elbow    Splint type:  Long arm    Supplies:  Sling, fiberglass and cotton padding  Post-procedure details:     Distal perfusion: distal pulses strong      Procedure completion:  Tolerated well, no immediate complications    Post-procedure imaging: not applicable           Riley Damon MD  07/16/24 151